# Patient Record
Sex: MALE | Race: WHITE | Employment: FULL TIME | URBAN - METROPOLITAN AREA
[De-identification: names, ages, dates, MRNs, and addresses within clinical notes are randomized per-mention and may not be internally consistent; named-entity substitution may affect disease eponyms.]

---

## 2019-01-11 ENCOUNTER — OFFICE VISIT (OUTPATIENT)
Dept: INTERNAL MEDICINE CLINIC | Facility: CLINIC | Age: 49
End: 2019-01-11
Payer: COMMERCIAL

## 2019-01-11 ENCOUNTER — MED REC SCAN ONLY (OUTPATIENT)
Dept: INTERNAL MEDICINE CLINIC | Facility: CLINIC | Age: 49
End: 2019-01-11

## 2019-01-11 VITALS
TEMPERATURE: 99 F | BODY MASS INDEX: 29.55 KG/M2 | HEIGHT: 68 IN | WEIGHT: 195 LBS | SYSTOLIC BLOOD PRESSURE: 124 MMHG | HEART RATE: 77 BPM | DIASTOLIC BLOOD PRESSURE: 78 MMHG | OXYGEN SATURATION: 98 %

## 2019-01-11 DIAGNOSIS — R21 RASH IN ADULT: ICD-10-CM

## 2019-01-11 DIAGNOSIS — Z00.00 ANNUAL PHYSICAL EXAM: Primary | ICD-10-CM

## 2019-01-11 DIAGNOSIS — N47.8 FORESKIN PROBLEM: ICD-10-CM

## 2019-01-11 DIAGNOSIS — Z86.39 HISTORY OF DIABETES MELLITUS: ICD-10-CM

## 2019-01-11 DIAGNOSIS — N52.9 ERECTILE DYSFUNCTION, UNSPECIFIED ERECTILE DYSFUNCTION TYPE: ICD-10-CM

## 2019-01-11 DIAGNOSIS — Z11.3 SCREENING EXAMINATION FOR STD (SEXUALLY TRANSMITTED DISEASE): ICD-10-CM

## 2019-01-11 PROCEDURE — 99386 PREV VISIT NEW AGE 40-64: CPT | Performed by: INTERNAL MEDICINE

## 2019-01-11 PROCEDURE — 90471 IMMUNIZATION ADMIN: CPT | Performed by: INTERNAL MEDICINE

## 2019-01-11 PROCEDURE — 90686 IIV4 VACC NO PRSV 0.5 ML IM: CPT | Performed by: INTERNAL MEDICINE

## 2019-01-11 RX ORDER — CHLORAL HYDRATE 500 MG
CAPSULE ORAL
COMMUNITY
End: 2019-10-04

## 2019-01-11 RX ORDER — OMEPRAZOLE 20 MG/1
20 CAPSULE, DELAYED RELEASE ORAL
COMMUNITY
End: 2020-07-02

## 2019-01-11 RX ORDER — CLOBETASOL PROPIONATE 0.5 MG/ML
1 LOTION TOPICAL 2 TIMES DAILY
Qty: 118 ML | Refills: 2 | Status: SHIPPED | OUTPATIENT
Start: 2019-01-11 | End: 2019-02-06 | Stop reason: ALTCHOICE

## 2019-01-11 RX ORDER — MULTIVITAMIN
1 TABLET ORAL DAILY
COMMUNITY

## 2019-01-11 RX ORDER — NICOTINE 14MG/24HR
PATCH, TRANSDERMAL 24 HOURS TRANSDERMAL
COMMUNITY
End: 2019-10-04

## 2019-01-11 RX ORDER — LORAZEPAM 1 MG
TABLET ORAL
COMMUNITY
End: 2019-02-04 | Stop reason: CLARIF

## 2019-01-11 RX ORDER — QUINIDINE SULFATE 200 MG
TABLET ORAL
COMMUNITY
End: 2019-10-04

## 2019-01-11 RX ORDER — PRASTERONE (DHEA) 50 MG
CAPSULE ORAL
COMMUNITY
End: 2019-10-04

## 2019-01-13 ENCOUNTER — LAB ENCOUNTER (OUTPATIENT)
Dept: LAB | Facility: HOSPITAL | Age: 49
End: 2019-01-13
Attending: INTERNAL MEDICINE
Payer: COMMERCIAL

## 2019-01-13 DIAGNOSIS — Z11.3 SCREENING EXAMINATION FOR STD (SEXUALLY TRANSMITTED DISEASE): ICD-10-CM

## 2019-01-13 DIAGNOSIS — E11.9 TYPE 2 DIABETES MELLITUS WITHOUT COMPLICATION, WITH LONG-TERM CURRENT USE OF INSULIN (HCC): Primary | ICD-10-CM

## 2019-01-13 DIAGNOSIS — Z79.4 TYPE 2 DIABETES MELLITUS WITHOUT COMPLICATION, WITH LONG-TERM CURRENT USE OF INSULIN (HCC): Primary | ICD-10-CM

## 2019-01-13 DIAGNOSIS — R21 RASH IN ADULT: ICD-10-CM

## 2019-01-13 DIAGNOSIS — N52.9 ERECTILE DYSFUNCTION, UNSPECIFIED ERECTILE DYSFUNCTION TYPE: ICD-10-CM

## 2019-01-13 DIAGNOSIS — Z86.39 HISTORY OF DIABETES MELLITUS: ICD-10-CM

## 2019-01-13 DIAGNOSIS — Z00.00 ANNUAL PHYSICAL EXAM: ICD-10-CM

## 2019-01-13 DIAGNOSIS — N47.8 FORESKIN PROBLEM: ICD-10-CM

## 2019-01-13 LAB
ALBUMIN SERPL BCP-MCNC: 4.1 G/DL (ref 3.5–4.8)
ALBUMIN/GLOB SERPL: 1.2 {RATIO} (ref 1–2)
ALP SERPL-CCNC: 106 U/L (ref 32–100)
ALT SERPL-CCNC: 25 U/L (ref 17–63)
ANION GAP SERPL CALC-SCNC: 11 MMOL/L (ref 0–18)
AST SERPL-CCNC: 19 U/L (ref 15–41)
BACTERIA UR QL AUTO: NEGATIVE /HPF
BASOPHILS # BLD: 0.1 K/UL (ref 0–0.2)
BASOPHILS NFR BLD: 1 %
BILIRUB SERPL-MCNC: 1.1 MG/DL (ref 0.3–1.2)
BILIRUB UR QL: NEGATIVE
BUN SERPL-MCNC: 10 MG/DL (ref 8–20)
BUN/CREAT SERPL: 10.5 (ref 10–20)
CALCIUM SERPL-MCNC: 9.7 MG/DL (ref 8.5–10.5)
CHLORIDE SERPL-SCNC: 98 MMOL/L (ref 95–110)
CHOLEST SERPL-MCNC: 182 MG/DL (ref 110–200)
CLARITY UR: CLEAR
CO2 SERPL-SCNC: 25 MMOL/L (ref 22–32)
COLOR UR: YELLOW
CREAT SERPL-MCNC: 0.95 MG/DL (ref 0.5–1.5)
CREAT UR-MCNC: 93.4 MG/DL
EOSINOPHIL # BLD: 0.3 K/UL (ref 0–0.7)
EOSINOPHIL NFR BLD: 4 %
ERYTHROCYTE [DISTWIDTH] IN BLOOD BY AUTOMATED COUNT: 12.9 % (ref 11–15)
EST. AVERAGE GLUCOSE BLD GHB EST-MCNC: 318 MG/DL (ref 68–126)
GLOBULIN PLAS-MCNC: 3.4 G/DL (ref 2.5–3.7)
GLUCOSE SERPL-MCNC: 354 MG/DL (ref 70–99)
GLUCOSE UR-MCNC: >=500 MG/DL
HBA1C MFR BLD HPLC: 12.7 % (ref ?–5.7)
HCT VFR BLD AUTO: 48.3 % (ref 41–52)
HDLC SERPL-MCNC: 38 MG/DL
HGB BLD-MCNC: 16.7 G/DL (ref 13.5–17.5)
HGB UR QL STRIP.AUTO: NEGATIVE
KETONES UR-MCNC: NEGATIVE MG/DL
LDLC SERPL CALC-MCNC: 104 MG/DL (ref 0–99)
LEUKOCYTE ESTERASE UR QL STRIP.AUTO: NEGATIVE
LYMPHOCYTES # BLD: 2.1 K/UL (ref 1–4)
LYMPHOCYTES NFR BLD: 33 %
MCH RBC QN AUTO: 30.3 PG (ref 27–32)
MCHC RBC AUTO-ENTMCNC: 34.7 G/DL (ref 32–37)
MCV RBC AUTO: 87.5 FL (ref 80–100)
MICROALBUMIN UR-MCNC: 0.2 MG/DL (ref 0–1.8)
MICROALBUMIN/CREAT UR: 2.1 MG/G{CREAT} (ref 0–20)
MONOCYTES # BLD: 0.6 K/UL (ref 0–1)
MONOCYTES NFR BLD: 10 %
NEUTROPHILS # BLD AUTO: 3.3 K/UL (ref 1.8–7.7)
NEUTROPHILS NFR BLD: 52 %
NITRITE UR QL STRIP.AUTO: NEGATIVE
NONHDLC SERPL-MCNC: 144 MG/DL
OSMOLALITY UR CALC.SUM OF ELEC: 291 MOSM/KG (ref 275–295)
PATIENT FASTING: YES
PH UR: 6 [PH] (ref 5–8)
PLATELET # BLD AUTO: 233 K/UL (ref 140–400)
PMV BLD AUTO: 8.3 FL (ref 7.4–10.3)
POTASSIUM SERPL-SCNC: 3.7 MMOL/L (ref 3.3–5.1)
PROT SERPL-MCNC: 7.5 G/DL (ref 5.9–8.4)
PROT UR-MCNC: NEGATIVE MG/DL
RBC # BLD AUTO: 5.52 M/UL (ref 4.5–5.9)
RBC #/AREA URNS AUTO: 1 /HPF
SODIUM SERPL-SCNC: 134 MMOL/L (ref 136–144)
SP GR UR STRIP: 1.02 (ref 1–1.03)
TRIGL SERPL-MCNC: 199 MG/DL (ref 1–149)
TSH SERPL-ACNC: 2.63 UIU/ML (ref 0.45–5.33)
UROBILINOGEN UR STRIP-ACNC: <2
VIT C UR-MCNC: NEGATIVE MG/DL
WBC # BLD AUTO: 6.4 K/UL (ref 4–11)
WBC #/AREA URNS AUTO: <1 /HPF

## 2019-01-13 PROCEDURE — 84402 ASSAY OF FREE TESTOSTERONE: CPT

## 2019-01-13 PROCEDURE — 86696 HERPES SIMPLEX TYPE 2 TEST: CPT

## 2019-01-13 PROCEDURE — 81001 URINALYSIS AUTO W/SCOPE: CPT

## 2019-01-13 PROCEDURE — 84443 ASSAY THYROID STIM HORMONE: CPT

## 2019-01-13 PROCEDURE — 87591 N.GONORRHOEAE DNA AMP PROB: CPT

## 2019-01-13 PROCEDURE — 85025 COMPLETE CBC W/AUTO DIFF WBC: CPT

## 2019-01-13 PROCEDURE — 87491 CHLMYD TRACH DNA AMP PROBE: CPT

## 2019-01-13 PROCEDURE — 82043 UR ALBUMIN QUANTITATIVE: CPT

## 2019-01-13 PROCEDURE — 36415 COLL VENOUS BLD VENIPUNCTURE: CPT

## 2019-01-13 PROCEDURE — 87389 HIV-1 AG W/HIV-1&-2 AB AG IA: CPT

## 2019-01-13 PROCEDURE — 83036 HEMOGLOBIN GLYCOSYLATED A1C: CPT

## 2019-01-13 PROCEDURE — 80061 LIPID PANEL: CPT

## 2019-01-13 PROCEDURE — 82570 ASSAY OF URINE CREATININE: CPT

## 2019-01-13 PROCEDURE — 84403 ASSAY OF TOTAL TESTOSTERONE: CPT

## 2019-01-13 PROCEDURE — 86694 HERPES SIMPLEX NES ANTBDY: CPT

## 2019-01-13 PROCEDURE — 80053 COMPREHEN METABOLIC PANEL: CPT

## 2019-01-13 PROCEDURE — 87340 HEPATITIS B SURFACE AG IA: CPT

## 2019-01-13 PROCEDURE — 86695 HERPES SIMPLEX TYPE 1 TEST: CPT

## 2019-01-13 PROCEDURE — 86803 HEPATITIS C AB TEST: CPT

## 2019-01-13 PROCEDURE — 86780 TREPONEMA PALLIDUM: CPT

## 2019-01-13 RX ORDER — GLIPIZIDE 5 MG/1
5 TABLET ORAL
Qty: 30 TABLET | Refills: 2 | Status: SHIPPED | OUTPATIENT
Start: 2019-01-13 | End: 2019-01-24

## 2019-01-13 NOTE — PROGRESS NOTES
HPI:    Patient ID: Marcellus Bowie is a 57year old male. HPI  Patient comes in for first time to establish care and discuss a few problems patient mentions that lately more than a few months or so he has been having problem with erection.   Difficulty w Take by mouth. Disp:  Rfl:    Safflower Oil (CLA) 1000 MG Oral Cap Take by mouth. Disp:  Rfl:    Multiple Vitamin (MULTI-VITAMIN DAILY) Oral Tab Take by mouth. Disp:  Rfl:    Omega-3 1000 MG Oral Cap Take by mouth.  Disp:  Rfl:    Saccharomyces boulardii (P motion. He exhibits no edema or tenderness. Neurological: He is alert and oriented to person, place, and time. He has normal reflexes. Skin: Skin is warm and dry. Rash noted. Psychiatric: He has a normal mood and affect.  His behavior is normal. Nahed

## 2019-01-14 LAB
C TRACH DNA SPEC QL NAA+PROBE: NEGATIVE
HBV SURFACE AG SERPL QL IA: NONREACTIVE
HCV AB SERPL QL IA: NONREACTIVE
HIV1+2 AB SERPL QL IA: NONREACTIVE
N GONORRHOEA DNA SPEC QL NAA+PROBE: NEGATIVE
T PALLIDUM AB SER QL: NEGATIVE

## 2019-01-15 ENCOUNTER — TELEPHONE (OUTPATIENT)
Dept: INTERNAL MEDICINE CLINIC | Facility: CLINIC | Age: 49
End: 2019-01-15

## 2019-01-15 DIAGNOSIS — E11.9 TYPE 2 DIABETES MELLITUS WITHOUT COMPLICATION, WITH LONG-TERM CURRENT USE OF INSULIN (HCC): Primary | ICD-10-CM

## 2019-01-15 DIAGNOSIS — Z79.4 TYPE 2 DIABETES MELLITUS WITHOUT COMPLICATION, WITH LONG-TERM CURRENT USE OF INSULIN (HCC): Primary | ICD-10-CM

## 2019-01-15 NOTE — TELEPHONE ENCOUNTER
Patient was told to check blood sugar daily patient states there is nothing at [facility] that they need order for testing machine. Has not started any medications that was prescribed want to wait and pick everything up at once.

## 2019-01-16 ENCOUNTER — PATIENT MESSAGE (OUTPATIENT)
Dept: INTERNAL MEDICINE CLINIC | Facility: CLINIC | Age: 49
End: 2019-01-16

## 2019-01-16 ENCOUNTER — TELEPHONE (OUTPATIENT)
Dept: INTERNAL MEDICINE CLINIC | Facility: CLINIC | Age: 49
End: 2019-01-16

## 2019-01-16 LAB
HSV 1 GLYCOPROTEIN G AB, IGG: 10.7 IV
HSV 2 GLYCOPROTEIN G AB, IGG: 0.25 IV
TESTOSTERONE, FREE, S: 10 NG/DL
TESTOSTERONE, TOTAL, S: 557 NG/DL

## 2019-01-16 RX ORDER — BLOOD SUGAR DIAGNOSTIC
STRIP MISCELLANEOUS
Qty: 100 STRIP | Refills: 2 | Status: ON HOLD | OUTPATIENT
Start: 2019-01-16 | End: 2019-11-07

## 2019-01-16 RX ORDER — LANCETS 30 GAUGE
EACH MISCELLANEOUS
Qty: 100 EACH | Refills: 1 | Status: SHIPPED | OUTPATIENT
Start: 2019-01-16 | End: 2019-12-06

## 2019-01-16 NOTE — TELEPHONE ENCOUNTER
From: Stevo Amato  To: Tracy Cuevas MD  Sent: 1/16/2019 1:00 PM CST  Subject: Non-Urgent Medical Question    Please call me when you get this message need to get prescription for test strips and lancets for Accu-Check Guide that's the meter I bought

## 2019-01-17 ENCOUNTER — TELEPHONE (OUTPATIENT)
Dept: INTERNAL MEDICINE CLINIC | Facility: CLINIC | Age: 49
End: 2019-01-17

## 2019-01-17 LAB
HSV TYPE 1/2 COMBINED AB, IGG: 20.1 IV
HSV TYPE 1/2 COMBINED ABS, IGM: 0.77 IV

## 2019-01-17 NOTE — TELEPHONE ENCOUNTER
Spoke with [facility] pharmacy and they have the lancets ready and they will fill the test strips for accucheck today. Pt informed.

## 2019-01-17 NOTE — TELEPHONE ENCOUNTER
Pt states that in [facility] he needs to see ophthalmologist, and also pneumonia vaccine. Please advise.

## 2019-01-17 NOTE — TELEPHONE ENCOUNTER
We can give the pn shot when we see him in 1 month and referral to eye doctor, HSV 1 is +, this causes cold sores, no need to treat unles he gets frequent brake outs, can discuss further when he comes in in 1 month

## 2019-01-18 NOTE — TELEPHONE ENCOUNTER
Pt asked to give him a call personal  Will like to speak with Dr. Magalys Marmolejo personal matter.

## 2019-01-24 ENCOUNTER — TELEPHONE (OUTPATIENT)
Dept: INTERNAL MEDICINE CLINIC | Facility: CLINIC | Age: 49
End: 2019-01-24

## 2019-01-24 RX ORDER — GLIPIZIDE 5 MG/1
5 TABLET ORAL
Qty: 30 TABLET | Refills: 2 | Status: SHIPPED | OUTPATIENT
Start: 2019-01-24 | End: 2019-06-24

## 2019-02-04 ENCOUNTER — HOSPITAL ENCOUNTER (OUTPATIENT)
Dept: ENDOCRINOLOGY | Facility: HOSPITAL | Age: 49
Discharge: HOME | End: 2019-02-04
Attending: INTERNAL MEDICINE
Payer: COMMERCIAL

## 2019-02-04 VITALS — WEIGHT: 202 LBS | BODY MASS INDEX: 31 KG/M2

## 2019-02-04 DIAGNOSIS — E11.65 TYPE 2 DIABETES MELLITUS WITH HYPERGLYCEMIA, WITHOUT LONG-TERM CURRENT USE OF INSULIN (HCC): Primary | ICD-10-CM

## 2019-02-04 NOTE — PROGRESS NOTES
Annabella Sprague  : 9/15/1970 attended individual initial assessment for Diabetes Education:    Date: 2019   Start time: 2:00 pm End time: 2:40 pm    HgbA1C (%)   Date Value   2019 12.7 (H)        Assessment: Mariely Perez was diagnosed with Diabetes m

## 2019-02-06 ENCOUNTER — OFFICE VISIT (OUTPATIENT)
Dept: DERMATOLOGY CLINIC | Facility: CLINIC | Age: 49
End: 2019-02-06
Payer: COMMERCIAL

## 2019-02-06 DIAGNOSIS — L40.8 OTHER PSORIASIS: ICD-10-CM

## 2019-02-06 DIAGNOSIS — L71.9 ROSACEA: ICD-10-CM

## 2019-02-06 DIAGNOSIS — L30.9 DERMATITIS: Primary | ICD-10-CM

## 2019-02-06 PROCEDURE — 99203 OFFICE O/P NEW LOW 30 MIN: CPT | Performed by: DERMATOLOGY

## 2019-02-06 PROCEDURE — 99212 OFFICE O/P EST SF 10 MIN: CPT | Performed by: DERMATOLOGY

## 2019-02-06 RX ORDER — CALCIPOTRIENE, BETAMETHASONE DIPROPIONATE 50; .643 UG/G; MG/G
1 OINTMENT TOPICAL DAILY
Qty: 100 G | Refills: 3 | Status: SHIPPED | OUTPATIENT
Start: 2019-02-06 | End: 2019-05-07

## 2019-02-06 NOTE — PROGRESS NOTES
Used cream -clobetasol? In past no change, used taclonex--still has not helpful, does not decrease the redness.

## 2019-02-11 ENCOUNTER — TELEPHONE (OUTPATIENT)
Dept: DERMATOLOGY CLINIC | Facility: CLINIC | Age: 49
End: 2019-02-11

## 2019-02-12 NOTE — TELEPHONE ENCOUNTER
per rep--best phamacy option is [facility] send there and let pt know. Alternative would be to send Ultravate lotion to [facility] . If necessary may try pa.

## 2019-02-15 ENCOUNTER — OFFICE VISIT (OUTPATIENT)
Dept: INTERNAL MEDICINE CLINIC | Facility: CLINIC | Age: 49
End: 2019-02-15
Payer: COMMERCIAL

## 2019-02-15 VITALS
DIASTOLIC BLOOD PRESSURE: 79 MMHG | WEIGHT: 195 LBS | HEIGHT: 68 IN | HEART RATE: 87 BPM | SYSTOLIC BLOOD PRESSURE: 124 MMHG | BODY MASS INDEX: 29.55 KG/M2

## 2019-02-15 DIAGNOSIS — R94.31 EKG, ABNORMAL: ICD-10-CM

## 2019-02-15 DIAGNOSIS — R20.0 BILATERAL HAND NUMBNESS: ICD-10-CM

## 2019-02-15 DIAGNOSIS — E78.5 HYPERLIPIDEMIA, UNSPECIFIED HYPERLIPIDEMIA TYPE: ICD-10-CM

## 2019-02-15 DIAGNOSIS — M25.541 ARTHRALGIA OF BOTH HANDS: ICD-10-CM

## 2019-02-15 DIAGNOSIS — R06.00 EXERTIONAL DYSPNEA: ICD-10-CM

## 2019-02-15 DIAGNOSIS — M25.542 ARTHRALGIA OF BOTH HANDS: ICD-10-CM

## 2019-02-15 DIAGNOSIS — E11.9 TYPE 2 DIABETES MELLITUS WITHOUT COMPLICATION, WITH LONG-TERM CURRENT USE OF INSULIN (HCC): Primary | ICD-10-CM

## 2019-02-15 DIAGNOSIS — L40.9 PSORIASIS: ICD-10-CM

## 2019-02-15 DIAGNOSIS — H54.7 VISION PROBLEMS: ICD-10-CM

## 2019-02-15 DIAGNOSIS — Z79.4 TYPE 2 DIABETES MELLITUS WITHOUT COMPLICATION, WITH LONG-TERM CURRENT USE OF INSULIN (HCC): Primary | ICD-10-CM

## 2019-02-15 DIAGNOSIS — N52.9 ERECTILE DYSFUNCTION, UNSPECIFIED ERECTILE DYSFUNCTION TYPE: ICD-10-CM

## 2019-02-15 LAB
EST. AVERAGE GLUCOSE BLD GHB EST-MCNC: 232 MG/DL (ref 68–126)
HBA1C MFR BLD HPLC: 9.7 % (ref ?–5.7)

## 2019-02-15 PROCEDURE — 36415 COLL VENOUS BLD VENIPUNCTURE: CPT | Performed by: INTERNAL MEDICINE

## 2019-02-15 PROCEDURE — 93000 ELECTROCARDIOGRAM COMPLETE: CPT | Performed by: INTERNAL MEDICINE

## 2019-02-15 PROCEDURE — 99215 OFFICE O/P EST HI 40 MIN: CPT | Performed by: INTERNAL MEDICINE

## 2019-02-15 RX ORDER — GABAPENTIN 100 MG/1
100 CAPSULE ORAL 3 TIMES DAILY
Qty: 30 CAPSULE | Refills: 2 | Status: SHIPPED | OUTPATIENT
Start: 2019-02-15 | End: 2019-10-04

## 2019-02-15 RX ORDER — NICOTINE 21 MG/24HR
1 PATCH, TRANSDERMAL 24 HOURS TRANSDERMAL EVERY 24 HOURS
Qty: 30 PATCH | Refills: 2 | Status: SHIPPED | OUTPATIENT
Start: 2019-02-15 | End: 2019-10-04

## 2019-02-15 RX ORDER — SIMVASTATIN 40 MG
40 TABLET ORAL NIGHTLY
Qty: 30 TABLET | Refills: 2 | Status: SHIPPED | OUTPATIENT
Start: 2019-02-15 | End: 2019-05-19

## 2019-02-15 NOTE — PROGRESS NOTES
HPI:    Patient ID: Percy Collins is a 57year old male.     HPI  Patient comes in for follow-up recently was found to have diabetes come back A1c was the 12th was started on metformin and glipizide says he has been taking his blood sugars have been pretty capsule (100 mg total) by mouth 3 (three) times daily. Disp: 30 capsule Rfl: 2   simvastatin 40 MG Oral Tab Take 1 tablet (40 mg total) by mouth nightly.  Disp: 30 tablet Rfl: 2   Halobetasol Propionate (BRYHALI) 0.01 % External Lotion Apply 1 Application t Diagnosis Date   • Diabetes ([facility] [address]    • Eczema    • Psoriasis       Past Surgical History:   Procedure Laterality Date   • HERNIA SURGERY        Family History   Problem Relation Age of Onset   • Diabetes Mother    • Cancer Sister       Social History:  So follow the stress test and if okay will decide to treat  Bilateral hand numbness most likely due to diabetes we will start him on gabapentin take at night also with a complaint of finger cyst swelling and joint pain referred to rheumatology due to history
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
## 2019-02-22 ENCOUNTER — MED REC SCAN ONLY (OUTPATIENT)
Dept: INTERNAL MEDICINE CLINIC | Facility: CLINIC | Age: 49
End: 2019-02-22

## 2019-03-06 ENCOUNTER — APPOINTMENT (OUTPATIENT)
Dept: ENDOCRINOLOGY | Facility: HOSPITAL | Age: 49
End: 2019-03-06
Attending: INTERNAL MEDICINE
Payer: COMMERCIAL

## 2019-03-11 ENCOUNTER — HOSPITAL ENCOUNTER (OUTPATIENT)
Dept: NUCLEAR MEDICINE | Facility: HOSPITAL | Age: 49
Discharge: HOME | End: 2019-03-11
Attending: INTERNAL MEDICINE
Payer: COMMERCIAL

## 2019-03-11 ENCOUNTER — HOSPITAL ENCOUNTER (OUTPATIENT)
Dept: CV DIAGNOSTICS | Facility: HOSPITAL | Age: 49
Discharge: HOME | End: 2019-03-11
Attending: INTERNAL MEDICINE
Payer: COMMERCIAL

## 2019-03-11 ENCOUNTER — APPOINTMENT (OUTPATIENT)
Dept: ENDOCRINOLOGY | Facility: HOSPITAL | Age: 49
End: 2019-03-11
Attending: INTERNAL MEDICINE
Payer: COMMERCIAL

## 2019-03-11 DIAGNOSIS — E11.9 TYPE 2 DIABETES MELLITUS WITHOUT COMPLICATION, WITH LONG-TERM CURRENT USE OF INSULIN (HCC): ICD-10-CM

## 2019-03-11 DIAGNOSIS — R94.31 EKG, ABNORMAL: ICD-10-CM

## 2019-03-11 DIAGNOSIS — Z79.4 TYPE 2 DIABETES MELLITUS WITHOUT COMPLICATION, WITH LONG-TERM CURRENT USE OF INSULIN (HCC): ICD-10-CM

## 2019-03-11 DIAGNOSIS — R06.00 EXERTIONAL DYSPNEA: ICD-10-CM

## 2019-03-11 PROCEDURE — 93016 CV STRESS TEST SUPVJ ONLY: CPT | Performed by: INTERNAL MEDICINE

## 2019-03-11 PROCEDURE — 93018 CV STRESS TEST I&R ONLY: CPT | Performed by: INTERNAL MEDICINE

## 2019-03-11 PROCEDURE — 93017 CV STRESS TEST TRACING ONLY: CPT | Performed by: INTERNAL MEDICINE

## 2019-03-11 PROCEDURE — 78452 HT MUSCLE IMAGE SPECT MULT: CPT | Performed by: INTERNAL MEDICINE

## 2019-03-11 RX ORDER — SODIUM CHLORIDE 9 MG/ML
INJECTION, SOLUTION INTRAVENOUS
Status: COMPLETED
Start: 2019-03-11 | End: 2019-03-11

## 2019-03-11 RX ADMIN — SODIUM CHLORIDE 75 ML: 9 INJECTION, SOLUTION INTRAVENOUS at 12:50:00

## 2019-03-18 ENCOUNTER — PATIENT MESSAGE (OUTPATIENT)
Dept: INTERNAL MEDICINE CLINIC | Facility: CLINIC | Age: 49
End: 2019-03-18

## 2019-03-18 ENCOUNTER — APPOINTMENT (OUTPATIENT)
Dept: ENDOCRINOLOGY | Facility: HOSPITAL | Age: 49
End: 2019-03-18
Attending: INTERNAL MEDICINE
Payer: COMMERCIAL

## 2019-04-16 NOTE — TELEPHONE ENCOUNTER
Failed Protocol  A1C >7.5  Diabetes Medications  Protocol Criteria:  · Appointment scheduled in the past 6 months or the next 3 months  · A1C < 7.5 in the past 6 months  · Creatinine in the past 12 months  · Creatinine result < 1.5   Recent Outpatient Visi

## 2019-04-22 ENCOUNTER — APPOINTMENT (OUTPATIENT)
Dept: ENDOCRINOLOGY | Facility: HOSPITAL | Age: 49
End: 2019-04-22
Attending: INTERNAL MEDICINE
Payer: COMMERCIAL

## 2019-05-04 ENCOUNTER — HOSPITAL ENCOUNTER (EMERGENCY)
Facility: HOSPITAL | Age: 49
Discharge: HOME | End: 2019-05-04
Payer: OTHER MISCELLANEOUS

## 2019-05-04 ENCOUNTER — APPOINTMENT (OUTPATIENT)
Dept: GENERAL RADIOLOGY | Facility: HOSPITAL | Age: 49
End: 2019-05-04
Payer: OTHER MISCELLANEOUS

## 2019-05-04 VITALS
RESPIRATION RATE: 18 BRPM | DIASTOLIC BLOOD PRESSURE: 86 MMHG | TEMPERATURE: 98 F | BODY MASS INDEX: 27.3 KG/M2 | HEIGHT: 71 IN | SYSTOLIC BLOOD PRESSURE: 135 MMHG | HEART RATE: 83 BPM | OXYGEN SATURATION: 98 % | WEIGHT: 195 LBS

## 2019-05-04 DIAGNOSIS — S93.409A MODERATE ANKLE SPRAIN, UNSPECIFIED LATERALITY, INITIAL ENCOUNTER: Primary | ICD-10-CM

## 2019-05-04 PROCEDURE — 73610 X-RAY EXAM OF ANKLE: CPT

## 2019-05-04 PROCEDURE — 99283 EMERGENCY DEPT VISIT LOW MDM: CPT

## 2019-05-04 NOTE — ED PROVIDER NOTES
Patient Seen in: [facility] AND [facility] Emergency Department    History   Patient presents with:  Lower Extremity Injury (musculoskeletal)    Stated Complaint: L ankle injury at work    HPI    Patient is a 61-year-old man who describes an inversion injury to joint osteoarthritis. 4. Lesser incidental findings as above.    Dictated by (CST): Harish Merida MD on 5/04/2019 at 14:22     Approved by (CST): Harish Merida MD on 5/04/2019 at 14:23                MDM   Xr Ankle (min 3 Views), Left (cpt=[id number])    Res

## 2019-05-04 NOTE — ED INITIAL ASSESSMENT (HPI)
Pt reports injuring left ankle at work. Per pt he twisted it while in the bathroom. Pt did not fall. Reports pain has worsened since it originally happened. CMS intact. Increased pain with weight bearing.

## 2019-05-20 RX ORDER — SIMVASTATIN 40 MG
TABLET ORAL
Qty: 90 TABLET | Refills: 1 | Status: SHIPPED | OUTPATIENT
Start: 2019-05-20 | End: 2019-10-04

## 2019-05-20 NOTE — TELEPHONE ENCOUNTER
Refill passed per [facility] protocol.   Cholesterol Medications  Protocol Criteria:  · Appointment scheduled in the past 12 months or in the next 3 months  · ALT & LDL on file in the past 12 months  · ALT result < 80  · LDL result <130   Recent Outpat

## 2019-06-03 ENCOUNTER — OFFICE VISIT (OUTPATIENT)
Dept: SURGERY | Facility: CLINIC | Age: 49
End: 2019-06-03
Payer: COMMERCIAL

## 2019-06-03 VITALS
TEMPERATURE: 98 F | HEIGHT: 71 IN | WEIGHT: 195 LBS | HEART RATE: 76 BPM | DIASTOLIC BLOOD PRESSURE: 77 MMHG | SYSTOLIC BLOOD PRESSURE: 118 MMHG | BODY MASS INDEX: 27.3 KG/M2

## 2019-06-03 DIAGNOSIS — N47.1 PHIMOSIS: Primary | ICD-10-CM

## 2019-06-03 PROCEDURE — 99212 OFFICE O/P EST SF 10 MIN: CPT | Performed by: UROLOGY

## 2019-06-03 PROCEDURE — 99244 OFF/OP CNSLTJ NEW/EST MOD 40: CPT | Performed by: UROLOGY

## 2019-06-03 NOTE — PROGRESS NOTES
SUBJECTIVE:  Brynn Green is a 57year old male who presents for a consultation at the request of, and a copy of this note will be sent to, Dr. David Marvin, for evaluation of  phimosis. He states that the problem is unchanged.  Symptoms include phimosis apparent distress. Alert and oriented times three, pleasant and cooperative.   CARDIOVASCULAR:normal apical impulse  RESPIRATORY: no chest wall deformities or tenderness  ABDOMEN: abdomen is soft without significant tenderness, masses, organomegaly or guar

## 2019-06-05 ENCOUNTER — TELEPHONE (OUTPATIENT)
Dept: SURGERY | Facility: CLINIC | Age: 49
End: 2019-06-05

## 2019-06-05 DIAGNOSIS — Z01.818 PREOP EXAMINATION: Primary | ICD-10-CM

## 2019-06-07 NOTE — TELEPHONE ENCOUNTER
Spoke with patient,scheduled circumcision Wednesday 07/24/19, [place] outpatient surgery, informed patient that labs are due prior to procedure, I will mail pre-op/labs to patient, verbalized understanding, all questions answered.

## 2019-06-24 RX ORDER — GLIPIZIDE 5 MG/1
5 TABLET ORAL
Qty: 90 TABLET | Refills: 1 | Status: SHIPPED | OUTPATIENT
Start: 2019-06-24 | End: 2019-12-21

## 2019-06-24 NOTE — TELEPHONE ENCOUNTER
Diabetes Medications  Protocol Criteria:  · Appointment scheduled in the past 6 months or the next 3 months  · A1C < 7.5 in the past 6 months  · Creatinine in the past 12 months  · Creatinine result < 1.5   Recent Outpatient Visits            3 weeks ago P

## 2019-07-11 ENCOUNTER — TELEPHONE (OUTPATIENT)
Dept: SURGERY | Facility: CLINIC | Age: 49
End: 2019-07-11

## 2019-07-11 NOTE — TELEPHONE ENCOUNTER
Pt is scheduled for procedure on 7-24-19. Pt has misplaced pre op orders. Blood test and ekg. Where would pt go for the ekg.   Please call pt to advise

## 2019-07-11 NOTE — TELEPHONE ENCOUNTER
Returned patient' call informed to have both done at the [facility], verbalized understanding, all questions answered.

## 2019-07-18 ENCOUNTER — TELEPHONE (OUTPATIENT)
Dept: SURGERY | Facility: CLINIC | Age: 49
End: 2019-07-18

## 2019-07-18 NOTE — TELEPHONE ENCOUNTER
Patient called to reschedule wednesday 07/24/19, procedure, will reschedule to Wednesday 09/04/19, patient verbalized understanding, thankful. Will inform surgery center, and update schedule.

## 2019-08-28 ENCOUNTER — TELEPHONE (OUTPATIENT)
Dept: SURGERY | Facility: CLINIC | Age: 49
End: 2019-08-28

## 2019-08-29 NOTE — TELEPHONE ENCOUNTER
Called patient back, due not having time off of work, patient would like to reschedule procedure to Wednesday 11/02/19, [facility] surgery center, patient ask that a letter be generated for date of change.

## 2019-09-19 ENCOUNTER — TELEPHONE (OUTPATIENT)
Dept: SURGERY | Facility: CLINIC | Age: 49
End: 2019-09-19

## 2019-09-23 NOTE — TELEPHONE ENCOUNTER
L/M asking patient to verbally confirm reschedule or cancellation. I have called several times, phone goes directly to v/m.

## 2019-10-04 ENCOUNTER — OFFICE VISIT (OUTPATIENT)
Dept: INTERNAL MEDICINE CLINIC | Facility: CLINIC | Age: 49
End: 2019-10-04
Payer: COMMERCIAL

## 2019-10-04 VITALS
TEMPERATURE: 98 F | OXYGEN SATURATION: 99 % | HEIGHT: 71 IN | DIASTOLIC BLOOD PRESSURE: 79 MMHG | SYSTOLIC BLOOD PRESSURE: 119 MMHG | BODY MASS INDEX: 27.81 KG/M2 | WEIGHT: 198.63 LBS | HEART RATE: 73 BPM

## 2019-10-04 DIAGNOSIS — N52.9 ERECTILE DYSFUNCTION, UNSPECIFIED ERECTILE DYSFUNCTION TYPE: ICD-10-CM

## 2019-10-04 DIAGNOSIS — Z79.4 TYPE 2 DIABETES MELLITUS WITHOUT COMPLICATION, WITH LONG-TERM CURRENT USE OF INSULIN (HCC): Primary | ICD-10-CM

## 2019-10-04 DIAGNOSIS — L40.9 PSORIASIS: ICD-10-CM

## 2019-10-04 DIAGNOSIS — E11.9 TYPE 2 DIABETES MELLITUS WITHOUT COMPLICATION, WITH LONG-TERM CURRENT USE OF INSULIN (HCC): Primary | ICD-10-CM

## 2019-10-04 DIAGNOSIS — E78.5 HYPERLIPIDEMIA, UNSPECIFIED HYPERLIPIDEMIA TYPE: ICD-10-CM

## 2019-10-04 PROCEDURE — 99214 OFFICE O/P EST MOD 30 MIN: CPT | Performed by: INTERNAL MEDICINE

## 2019-10-04 RX ORDER — SILDENAFIL 100 MG/1
100 TABLET, FILM COATED ORAL AS NEEDED
Qty: 9 TABLET | Refills: 3 | Status: SHIPPED | OUTPATIENT
Start: 2019-10-04 | End: 2020-06-21

## 2019-10-04 NOTE — PROGRESS NOTES
HPI:    Patient ID: Indio Mccoy is a 54year old male. HPI  Pt comes in for follow up on the DM, over all doing ok taking meds watching diet but he checking his blood sugars.   Patient had seen Dr. Michelle People for his psoriasis had used some cream which sh Lancets for Accu-Chek Guide Testing 2-3 times a day Dx E11.65, patient not insulin dependent.  Disp: 100 each Rfl: 1     Allergies:No Known Allergies    HISTORY:  Past Medical History:   Diagnosis Date   • Diabetes ([facility] [address]    • Eczema    • Psoriasis       Pas unspecified erectile dysfunction type-start medication take as prescribed  Hyperlipidemia, unspecified hyperlipidemia type retest patient never started the medication will decide on treatment pending results  Psoriasis stable.     Orders Placed This Encount

## 2019-11-01 PROBLEM — F32.9 MAJOR DEPRESSION: Status: ACTIVE | Noted: 2019-11-01

## 2019-11-01 NOTE — ED NOTES
Contreras Henley has been accepted for admission to [facility] under the care of Dr Grecia Waggoner. He will be admitted to room 614B. Phone number to call nurse report is [phone]. Update Contreras Henley regarding acceptance to [facility] and he agree to sign himself in.

## 2019-11-01 NOTE — ED NOTES
Assumed care of patient, pt resting comfortably on cart, next for  , will continue to monitor closely
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
## 2019-11-01 NOTE — ED INITIAL ASSESSMENT (HPI)
Patient reports depression, SI \"due to alcohol and drugs. \" Has not slept in 64 hours. States he would run his car in to a tree.

## 2019-11-01 NOTE — ED NOTES
54year old male here from home with SI and thoughts to run car into a tree, pt denies past attempts but was admitted to psych a couple a years ago, pt reports has been self medicating with drugs at alcohol, last used crack cocaine this am and drank a coup

## 2019-11-01 NOTE — ED PROVIDER NOTES
Patient Seen in: [facility] AND [facility] Emergency Department      History   Patient presents with:  Eval-P (psychiatric)    Stated Complaint:     HPI    Patient is a 31-year-old male who presents to the emergency department with a chief complaint of suicidal Musculoskeletal: Neck supple. Cardiovascular:      Rate and Rhythm: Normal rate and regular rhythm. Pulses: Normal pulses. Heart sounds: Normal heart sounds.    Pulmonary:      Effort: Pulmonary effort is normal.   Abdominal:      General: Abdom DIFFERENTIAL[[id number]]          Abnormal            Final result                 Please view results for these tests on the individual orders.    URINALYSIS WITH CULTURE REFLEX   RAINBOW DRAW BLUE   RAINBOW DRAW LAVENDER   RAINBOW DRAW LIGHT GREEN   [address]

## 2019-11-02 PROBLEM — F10.20 ALCOHOL USE DISORDER, SEVERE, DEPENDENCE (HCC): Status: ACTIVE | Noted: 2019-11-02

## 2019-11-02 PROBLEM — F15.90 STIMULANT USE DISORDER: Status: ACTIVE | Noted: 2019-11-02

## 2019-11-02 PROBLEM — F33.2 SEVERE RECURRENT MAJOR DEPRESSION WITHOUT PSYCHOTIC FEATURES (HCC): Status: ACTIVE | Noted: 2019-11-02

## 2019-11-14 NOTE — TELEPHONE ENCOUNTER
Left message to call back to clarify with pt if still on metformin. MyChart message also sent.   Per chart review: metformin only active on med list as pt reported (historical); previously d/c'd from med list by Dr Alicia Frias during 10/4/19 office visit:    sandy

## 2019-11-18 NOTE — TELEPHONE ENCOUNTER
Last attempt to reach by phone. No response letter mailed. Css: if patient does call back, transfer to triage dept.

## 2019-11-29 NOTE — TELEPHONE ENCOUNTER
CSS, please assist patient in making an appointment with Dr. Isma Deal as soon as possible regarding his refill request.        Left message to call back. "[employer]t message was also sent to patient informing him to call our office to schedule an appointment.

## 2019-11-29 NOTE — TELEPHONE ENCOUNTER
Doctor, received message from pt stating he is still taking Metformin 1000 mg, med was listed as discontinued at 10/4/19 OV. Please advise.      Diabetes Medications  Protocol Criteria:  · Appointment scheduled in the past 6 months or the next 3 months

## 2019-12-06 NOTE — PROGRESS NOTES
HPI:    Patient ID: Roger Gold is a 54year old male.     HPI  Is in today for follow-up after he was seen in ER with suicidal thoughts he was admitted in his psych unit at [facility] patient with history of alcohol abuse and cocaine he says he was inp ITCHING    Comment: Cat dander: Watery eyes, sneezing    HISTORY:  Past Medical History:   Diagnosis Date   • Depression    • Diabetes ([facility] [address]    • Eczema    • Hyperlipidemia    • Psoriasis       Past Surgical History:   Procedure Laterality Date   • H Judgment and thought content normal.   Nursing note and vitals reviewed.            ASSESSMENT/PLAN:   Follow-up examination after psychotherapy  (primary encounter diagnosis)- doing well, taking medication,   Type 2 diabetes mellitus without complication,

## 2019-12-09 ENCOUNTER — APPOINTMENT (OUTPATIENT)
Dept: LAB | Facility: HOSPITAL | Age: 49
End: 2019-12-09
Attending: INTERNAL MEDICINE
Payer: COMMERCIAL

## 2019-12-09 DIAGNOSIS — N52.9 ERECTILE DYSFUNCTION, UNSPECIFIED ERECTILE DYSFUNCTION TYPE: ICD-10-CM

## 2019-12-09 DIAGNOSIS — E11.9 TYPE 2 DIABETES MELLITUS WITHOUT COMPLICATION, WITH LONG-TERM CURRENT USE OF INSULIN (HCC): ICD-10-CM

## 2019-12-09 DIAGNOSIS — Z79.4 TYPE 2 DIABETES MELLITUS WITHOUT COMPLICATION, WITH LONG-TERM CURRENT USE OF INSULIN (HCC): ICD-10-CM

## 2019-12-09 DIAGNOSIS — E78.5 HYPERLIPIDEMIA, UNSPECIFIED HYPERLIPIDEMIA TYPE: ICD-10-CM

## 2019-12-09 PROCEDURE — 83036 HEMOGLOBIN GLYCOSYLATED A1C: CPT

## 2019-12-09 PROCEDURE — 82570 ASSAY OF URINE CREATININE: CPT

## 2019-12-09 PROCEDURE — 82043 UR ALBUMIN QUANTITATIVE: CPT

## 2019-12-09 PROCEDURE — 36415 COLL VENOUS BLD VENIPUNCTURE: CPT

## 2019-12-09 PROCEDURE — 80061 LIPID PANEL: CPT

## 2019-12-21 ENCOUNTER — HOSPITAL ENCOUNTER (EMERGENCY)
Facility: HOSPITAL | Age: 49
Discharge: HOME | End: 2019-12-21
Attending: EMERGENCY MEDICINE
Payer: COMMERCIAL

## 2019-12-21 VITALS
TEMPERATURE: 98 F | WEIGHT: 200 LBS | DIASTOLIC BLOOD PRESSURE: 83 MMHG | OXYGEN SATURATION: 98 % | HEART RATE: 62 BPM | RESPIRATION RATE: 14 BRPM | SYSTOLIC BLOOD PRESSURE: 122 MMHG | BODY MASS INDEX: 28 KG/M2

## 2019-12-21 DIAGNOSIS — K02.9 PAIN DUE TO DENTAL CARIES: Primary | ICD-10-CM

## 2019-12-21 PROCEDURE — 99283 EMERGENCY DEPT VISIT LOW MDM: CPT

## 2019-12-21 RX ORDER — IBUPROFEN 600 MG/1
600 TABLET ORAL EVERY 8 HOURS PRN
Qty: 30 TABLET | Refills: 0 | Status: SHIPPED | OUTPATIENT
Start: 2019-12-21 | End: 2019-12-28

## 2019-12-21 RX ORDER — IBUPROFEN 600 MG/1
600 TABLET ORAL ONCE
Status: COMPLETED | OUTPATIENT
Start: 2019-12-21 | End: 2019-12-21

## 2019-12-21 RX ORDER — AMOXICILLIN 875 MG/1
875 TABLET, COATED ORAL 2 TIMES DAILY
Qty: 10 TABLET | Refills: 0 | Status: SHIPPED | OUTPATIENT
Start: 2019-12-21 | End: 2019-12-26

## 2019-12-21 RX ORDER — HYDROCODONE BITARTRATE AND ACETAMINOPHEN 5; 325 MG/1; MG/1
1 TABLET ORAL ONCE
Status: COMPLETED | OUTPATIENT
Start: 2019-12-21 | End: 2019-12-21

## 2019-12-21 RX ORDER — GLIPIZIDE 5 MG/1
5 TABLET ORAL
Qty: 90 TABLET | Refills: 1 | Status: SHIPPED | OUTPATIENT
Start: 2019-12-21 | End: 2020-02-27

## 2019-12-21 RX ORDER — HYDROCODONE BITARTRATE AND ACETAMINOPHEN 5; 325 MG/1; MG/1
1 TABLET ORAL EVERY 6 HOURS PRN
Qty: 16 TABLET | Refills: 0 | Status: SHIPPED | OUTPATIENT
Start: 2019-12-21 | End: 2019-12-28

## 2019-12-21 NOTE — ED PROVIDER NOTES
Patient Seen in: [facility]      History   Patient presents with:  Dental Problem    Stated Complaint: Toothache     HPI    33-year-old male with past medical history significant for depression, diabetes, high cholesterol, pso Physical Exam    Physical Exam   Constitutional: AAOx3, well nourished, NAD  Head: Normocephalic and atraumatic.    Ears: TM's clear b/l  Nose: Nose normal.   Mouth/Throat: MMM, post OP clear with no exudates, poor dentition, left upper molar is loo

## 2019-12-21 NOTE — TELEPHONE ENCOUNTER
Refill passed per [facility] protocol.     Diabetes Medications  Protocol Criteria:  · Appointment scheduled in the past 6 months or the next 3 months  · A1C < 7.5 in the past 6 months  · Creatinine in the past 12 months  · Creatinine result < 1.5   Re fup    In 2 months Louisa Ball, [address], [place] fup    In 2 months Louisa Ball, [address], [place] fup    In 2 months Sofia Catalan, [address], [place]

## 2020-02-14 ENCOUNTER — TELEPHONE (OUTPATIENT)
Dept: INTERNAL MEDICINE CLINIC | Facility: CLINIC | Age: 50
End: 2020-02-14

## 2020-02-14 NOTE — TELEPHONE ENCOUNTER
Left message to call back. Transfer to triage    Refill request for  SIMVASTATIN 40 MG Oral Tab (Discontinued)    Per 10/4/19 Office visit notes  Hyperlipidemia, unspecified hyperlipidemia type retest patient never started the medication will decide on treatment pending results  Psoriasis stable. I could not find where was restarted.

## 2020-02-27 ENCOUNTER — OFFICE VISIT (OUTPATIENT)
Dept: INTERNAL MEDICINE CLINIC | Facility: CLINIC | Age: 50
End: 2020-02-27
Payer: COMMERCIAL

## 2020-02-27 VITALS
HEART RATE: 71 BPM | SYSTOLIC BLOOD PRESSURE: 138 MMHG | DIASTOLIC BLOOD PRESSURE: 86 MMHG | RESPIRATION RATE: 17 BRPM | OXYGEN SATURATION: 99 % | WEIGHT: 202 LBS | TEMPERATURE: 99 F | BODY MASS INDEX: 28.28 KG/M2 | HEIGHT: 71 IN

## 2020-02-27 DIAGNOSIS — Z00.00 ANNUAL PHYSICAL EXAM: Primary | ICD-10-CM

## 2020-02-27 DIAGNOSIS — E11.9 TYPE 2 DIABETES MELLITUS WITHOUT COMPLICATION, WITH LONG-TERM CURRENT USE OF INSULIN (HCC): ICD-10-CM

## 2020-02-27 DIAGNOSIS — Z79.4 TYPE 2 DIABETES MELLITUS WITHOUT COMPLICATION, WITH LONG-TERM CURRENT USE OF INSULIN (HCC): ICD-10-CM

## 2020-02-27 DIAGNOSIS — F10.11 ALCOHOL ABUSE, IN REMISSION: ICD-10-CM

## 2020-02-27 DIAGNOSIS — L40.9 PSORIASIS: ICD-10-CM

## 2020-02-27 DIAGNOSIS — F33.2 SEVERE RECURRENT MAJOR DEPRESSION WITHOUT PSYCHOTIC FEATURES (HCC): ICD-10-CM

## 2020-02-27 DIAGNOSIS — F17.200 SMOKER: ICD-10-CM

## 2020-02-27 DIAGNOSIS — E78.5 HYPERLIPIDEMIA, UNSPECIFIED HYPERLIPIDEMIA TYPE: ICD-10-CM

## 2020-02-27 DIAGNOSIS — N52.9 ERECTILE DYSFUNCTION, UNSPECIFIED ERECTILE DYSFUNCTION TYPE: ICD-10-CM

## 2020-02-27 DIAGNOSIS — F19.11 HISTORY OF DRUG ABUSE IN REMISSION (HCC): ICD-10-CM

## 2020-02-27 LAB
CHOLEST SMN-MCNC: 167 MG/DL (ref ?–200)
CREAT UR-SCNC: 172 MG/DL
EST. AVERAGE GLUCOSE BLD GHB EST-MCNC: 137 MG/DL (ref 68–126)
HBA1C MFR BLD HPLC: 6.4 % (ref ?–5.7)
HDLC SERPL-MCNC: 36 MG/DL (ref 40–59)
LDLC SERPL CALC-MCNC: 92 MG/DL (ref ?–100)
MICROALBUMIN UR-MCNC: 0.58 MG/DL
MICROALBUMIN/CREAT 24H UR-RTO: 3.4 UG/MG (ref ?–30)
NONHDLC SERPL-MCNC: 131 MG/DL (ref ?–130)
PATIENT FASTING Y/N/NP: YES
TRIGL SERPL-MCNC: 194 MG/DL (ref 30–149)
VLDLC SERPL CALC-MCNC: 39 MG/DL (ref 0–30)

## 2020-02-27 PROCEDURE — 99396 PREV VISIT EST AGE 40-64: CPT | Performed by: INTERNAL MEDICINE

## 2020-02-27 PROCEDURE — 36415 COLL VENOUS BLD VENIPUNCTURE: CPT | Performed by: INTERNAL MEDICINE

## 2020-02-27 RX ORDER — FLUOXETINE 20 MG/1
20 TABLET, FILM COATED ORAL
Qty: 30 TABLET | Refills: 0 | Status: SHIPPED | OUTPATIENT
Start: 2020-02-27 | End: 2020-03-21

## 2020-02-27 NOTE — PROGRESS NOTES
HPI:    Patient ID: Chitra Helm is a 54year old male.     HPI  Patient comes in today for annual physical overall is doing great and his blood sugars have been well controlled last time A1c was 6.7 he has stopped taking the glipizide because of time his sneezing    HISTORY:  Past Medical History:   Diagnosis Date   • Depression    • Diabetes ([facility] [address]    • Eczema    • Hyperlipidemia    • Psoriasis       Past Surgical History:   Procedure Laterality Date   • HERNIA SURGERY        Family History   Problem Relat and vitals reviewed.            ASSESSMENT/PLAN:   Annual physical exam  (primary encounter diagnosis) is okay we will order labs  Type 2 diabetes mellitus without complication, with long-term current use of insulin (hcc) we will retest stop the glipizide d

## 2020-03-06 ENCOUNTER — PATIENT MESSAGE (OUTPATIENT)
Dept: INTERNAL MEDICINE CLINIC | Facility: CLINIC | Age: 50
End: 2020-03-06

## 2020-03-06 NOTE — TELEPHONE ENCOUNTER
From: Gopi White  To: Shivam Del Castillo MD  Sent: 3/6/2020 4:05 AM CST  Subject: Referral Request    Where are my referrals?

## 2020-03-21 RX ORDER — FLUOXETINE 20 MG/1
TABLET, FILM COATED ORAL
Qty: 30 TABLET | Refills: 0 | Status: SHIPPED | OUTPATIENT
Start: 2020-03-21 | End: 2020-04-14

## 2020-03-25 ENCOUNTER — TELEPHONE (OUTPATIENT)
Dept: INTERNAL MEDICINE CLINIC | Facility: CLINIC | Age: 50
End: 2020-03-25

## 2020-03-25 ENCOUNTER — NURSE TRIAGE (OUTPATIENT)
Dept: OTHER | Age: 50
End: 2020-03-25

## 2020-03-25 NOTE — TELEPHONE ENCOUNTER
Action Requested: Summary for Provider     []  Critical Lab, Recommendations Needed  [x] Need Additional Advice  []   FYI    []   Need Orders  [x] Need Medications Sent to Pharmacy  []  Other     SUMMARY:      Does patient need to be seen or have  telephon

## 2020-03-25 NOTE — TELEPHONE ENCOUNTER
Please see pt message below. [employer] message sent to pt to have him call office.

## 2020-03-25 NOTE — TELEPHONE ENCOUNTER
I spoke with the patient he refused virtual visit . He states that he already discussed with the nurse and he does not have any fever he has mild cough that he is taking over-the-counter medication no shortness of breath.   Advised him to keep drinking a l

## 2020-03-25 NOTE — TELEPHONE ENCOUNTER
----- Message from Court Campos sent at 3/25/2020  4:17 PM CDT -----  Regarding: Other  Contact: [phone]  I'd like to see you doc but no appointment till Tuesday?  Not sure if urgent but have had some symptoms of sore throat and a little cough and no

## 2020-03-26 ENCOUNTER — TELEPHONE (OUTPATIENT)
Dept: INTERNAL MEDICINE CLINIC | Facility: CLINIC | Age: 50
End: 2020-03-26

## 2020-03-26 NOTE — TELEPHONE ENCOUNTER
Left message to call back. Advised that we are not seeing patients in the clinic who are having URI symptoms. Advised patient to call back to discuss appt on 3/31/20. CSS --> please transfer to triage.     See Triage encounter 3/25/20 - at [facility] disc

## 2020-03-26 NOTE — TELEPHONE ENCOUNTER
Patient made an appointment  to see Dr. Amelie Frias for please advice    Patient Comments:  Cold symptoms

## 2020-03-30 NOTE — TELEPHONE ENCOUNTER
Can we check with pt see how he is doing, and if he wants to talk with me let him know about the telemedicine charges and if he would agree to it

## 2020-04-13 NOTE — TELEPHONE ENCOUNTER
Left message to call back. Transfer to triage. Patient viewed no phone response letter sent on 4/11/20.

## 2020-04-14 RX ORDER — FLUOXETINE 20 MG/1
TABLET, FILM COATED ORAL
Qty: 30 TABLET | Refills: 0 | Status: SHIPPED | OUTPATIENT
Start: 2020-04-14 | End: 2020-05-05

## 2020-04-14 NOTE — TELEPHONE ENCOUNTER
Spoke to patient. Transferred call to Carlos Eduardo Cole to make a follow up appointment. His appointment is on May 5 @ 11:30.

## 2020-05-05 ENCOUNTER — OFFICE VISIT (OUTPATIENT)
Dept: INTERNAL MEDICINE CLINIC | Facility: CLINIC | Age: 50
End: 2020-05-05
Payer: COMMERCIAL

## 2020-05-05 VITALS
DIASTOLIC BLOOD PRESSURE: 88 MMHG | SYSTOLIC BLOOD PRESSURE: 130 MMHG | HEIGHT: 71 IN | TEMPERATURE: 98 F | WEIGHT: 213.63 LBS | OXYGEN SATURATION: 98 % | BODY MASS INDEX: 29.91 KG/M2 | RESPIRATION RATE: 18 BRPM | HEART RATE: 76 BPM

## 2020-05-05 DIAGNOSIS — Z79.4 TYPE 2 DIABETES MELLITUS WITHOUT COMPLICATION, WITH LONG-TERM CURRENT USE OF INSULIN (HCC): Primary | ICD-10-CM

## 2020-05-05 DIAGNOSIS — Z86.59 HISTORY OF DEPRESSION: ICD-10-CM

## 2020-05-05 DIAGNOSIS — F19.11 HISTORY OF DRUG ABUSE IN REMISSION (HCC): ICD-10-CM

## 2020-05-05 DIAGNOSIS — E11.9 TYPE 2 DIABETES MELLITUS WITHOUT COMPLICATION, WITH LONG-TERM CURRENT USE OF INSULIN (HCC): Primary | ICD-10-CM

## 2020-05-05 DIAGNOSIS — F10.11 ALCOHOL ABUSE, IN REMISSION: ICD-10-CM

## 2020-05-05 DIAGNOSIS — E78.5 HYPERLIPIDEMIA, UNSPECIFIED HYPERLIPIDEMIA TYPE: ICD-10-CM

## 2020-05-05 DIAGNOSIS — Z87.09 HISTORY OF URI (UPPER RESPIRATORY INFECTION): ICD-10-CM

## 2020-05-05 DIAGNOSIS — F17.200 SMOKER: ICD-10-CM

## 2020-05-05 PROCEDURE — 99214 OFFICE O/P EST MOD 30 MIN: CPT | Performed by: INTERNAL MEDICINE

## 2020-05-05 NOTE — PROGRESS NOTES
HPI:    Patient ID: Yany Glez is a 54year old male.     HPI  Patient comes in today for 3 months follow-up overall patient is doing great denies any complaints today he had called about 5 to 6 weeks ago with some upper respiratory symptoms but mild at ITCHING    Comment: Cat dander: Watery eyes, sneezing    HISTORY:  Past Medical History:   Diagnosis Date   • Depression    • Diabetes ([facility] [address]    • Eczema    • Hyperlipidemia    • Psoriasis       Past Surgical History:   Procedure Laterality Date   • HE Judgment and thought content normal.   Nursing note and vitals reviewed.            ASSESSMENT/PLAN:   Type 2 diabetes mellitus without complication, with long-term current use of insulin (hcc)  (primary encounter diagnosis)continue with current care, will

## 2020-05-13 ENCOUNTER — TELEPHONE (OUTPATIENT)
Dept: INTERNAL MEDICINE CLINIC | Facility: CLINIC | Age: 50
End: 2020-05-13

## 2020-05-14 ENCOUNTER — TELEMEDICINE (OUTPATIENT)
Dept: INTERNAL MEDICINE CLINIC | Facility: CLINIC | Age: 50
End: 2020-05-14

## 2020-05-14 DIAGNOSIS — R05.9 COUGH: ICD-10-CM

## 2020-05-14 DIAGNOSIS — J02.9 SORE THROAT: ICD-10-CM

## 2020-05-14 DIAGNOSIS — R19.7 DIARRHEA, UNSPECIFIED TYPE: ICD-10-CM

## 2020-05-14 DIAGNOSIS — M79.10 MYALGIA: ICD-10-CM

## 2020-05-14 DIAGNOSIS — Z20.822 EXPOSURE TO COVID-19 VIRUS: Primary | ICD-10-CM

## 2020-05-14 PROCEDURE — 99214 OFFICE O/P EST MOD 30 MIN: CPT | Performed by: INTERNAL MEDICINE

## 2020-05-14 NOTE — TELEPHONE ENCOUNTER
Action Requested: Summary for Provider     []  Critical Lab, Recommendations Needed  [] Need Additional Advice  []   FYI    []   Need Orders  [] Need Medications Sent to Pharmacy  []  Other     SUMMARY: virtual visit scheduled today with Dr Vinicius Gomez for eval.

## 2020-05-14 NOTE — PROGRESS NOTES
HPI:    Patient ID: Ernestine Calzada is a 54year old male. 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   Denies fever, cough, SOB,    Pt reports headache, diarrhea, body aches.        Review of Systems Social History    Tobacco Use      Smoking status: Light Tobacco Smoker        Packs/day: 0.20        Years: 15.00        Pack years: 3      Smokeless tobacco: Never Used    Alcohol use:  Yes      Alcohol/week: 4.0 standard drinks      Types: 4 Cans of beer

## 2020-05-15 ENCOUNTER — LAB ENCOUNTER (OUTPATIENT)
Dept: LAB | Facility: HOSPITAL | Age: 50
End: 2020-05-15
Attending: INTERNAL MEDICINE
Payer: COMMERCIAL

## 2020-05-15 DIAGNOSIS — M79.10 MYALGIA: ICD-10-CM

## 2020-05-15 DIAGNOSIS — J02.9 SORE THROAT: ICD-10-CM

## 2020-05-15 DIAGNOSIS — Z20.822 EXPOSURE TO COVID-19 VIRUS: ICD-10-CM

## 2020-05-15 DIAGNOSIS — R05.9 COUGH: ICD-10-CM

## 2020-05-15 DIAGNOSIS — R19.7 DIARRHEA, UNSPECIFIED TYPE: ICD-10-CM

## 2020-05-15 RX ORDER — FLUOXETINE 20 MG/1
TABLET ORAL
Qty: 30 TABLET | Refills: 0 | Status: ON HOLD | OUTPATIENT
Start: 2020-05-15 | End: 2020-10-05

## 2020-05-18 ENCOUNTER — VIRTUAL PHONE E/M (OUTPATIENT)
Dept: INTERNAL MEDICINE CLINIC | Facility: CLINIC | Age: 50
End: 2020-05-18
Payer: COMMERCIAL

## 2020-05-18 DIAGNOSIS — Z09 FOLLOW-UP EXAMINATION: Primary | ICD-10-CM

## 2020-05-18 PROCEDURE — 99212 OFFICE O/P EST SF 10 MIN: CPT | Performed by: INTERNAL MEDICINE

## 2020-05-19 NOTE — PROGRESS NOTES
Virtual Telephone Check-In    Cande Kincaid verbally consents to a Virtual/Telephone Check-In visit on 05/18/20. Patient understands and accepts financial responsibility for any deductible, co-insurance and/or co-pays associated with this service.     D

## 2020-06-17 RX ORDER — GLIPIZIDE 5 MG/1
5 TABLET ORAL
Qty: 90 TABLET | Refills: 1 | Status: SHIPPED | OUTPATIENT
Start: 2020-06-17 | End: 2020-07-05

## 2020-06-22 RX ORDER — SILDENAFIL 100 MG/1
100 TABLET, FILM COATED ORAL AS NEEDED
Qty: 9 TABLET | Refills: 3 | Status: ON HOLD | OUTPATIENT
Start: 2020-06-22 | End: 2020-10-05

## 2020-07-02 ENCOUNTER — HOSPITAL ENCOUNTER (OUTPATIENT)
Dept: ULTRASOUND IMAGING | Facility: HOSPITAL | Age: 50
Discharge: HOME | End: 2020-07-02
Attending: INTERNAL MEDICINE
Payer: COMMERCIAL

## 2020-07-02 ENCOUNTER — OFFICE VISIT (OUTPATIENT)
Dept: INTERNAL MEDICINE CLINIC | Facility: CLINIC | Age: 50
End: 2020-07-02
Payer: COMMERCIAL

## 2020-07-02 VITALS
OXYGEN SATURATION: 99 % | DIASTOLIC BLOOD PRESSURE: 74 MMHG | BODY MASS INDEX: 29.54 KG/M2 | WEIGHT: 211 LBS | RESPIRATION RATE: 18 BRPM | HEART RATE: 78 BPM | TEMPERATURE: 99 F | HEIGHT: 71 IN | SYSTOLIC BLOOD PRESSURE: 116 MMHG

## 2020-07-02 DIAGNOSIS — R11.0 NAUSEA: ICD-10-CM

## 2020-07-02 DIAGNOSIS — E78.5 HYPERLIPIDEMIA, UNSPECIFIED HYPERLIPIDEMIA TYPE: ICD-10-CM

## 2020-07-02 DIAGNOSIS — K59.00 CONSTIPATION, UNSPECIFIED CONSTIPATION TYPE: ICD-10-CM

## 2020-07-02 DIAGNOSIS — R10.11 ABDOMINAL PAIN, RUQ: Primary | ICD-10-CM

## 2020-07-02 DIAGNOSIS — Z79.4 TYPE 2 DIABETES MELLITUS WITHOUT COMPLICATION, WITH LONG-TERM CURRENT USE OF INSULIN (HCC): ICD-10-CM

## 2020-07-02 DIAGNOSIS — R10.11 ABDOMINAL PAIN, RUQ: ICD-10-CM

## 2020-07-02 DIAGNOSIS — E11.9 TYPE 2 DIABETES MELLITUS WITHOUT COMPLICATION, WITH LONG-TERM CURRENT USE OF INSULIN (HCC): ICD-10-CM

## 2020-07-02 LAB
ALBUMIN SERPL-MCNC: 4.1 G/DL (ref 3.4–5)
ALBUMIN/GLOB SERPL: 1.1 {RATIO} (ref 1–2)
ALP LIVER SERPL-CCNC: 97 U/L (ref 45–117)
ALT SERPL-CCNC: 35 U/L (ref 16–61)
ANION GAP SERPL CALC-SCNC: 6 MMOL/L (ref 0–18)
AST SERPL-CCNC: 21 U/L (ref 15–37)
BASOPHILS # BLD AUTO: 0.05 X10(3) UL (ref 0–0.2)
BASOPHILS NFR BLD AUTO: 0.8 %
BILIRUB SERPL-MCNC: 0.6 MG/DL (ref 0.1–2)
BUN BLD-MCNC: 8 MG/DL (ref 7–18)
BUN/CREAT SERPL: 7.6 (ref 10–20)
CALCIUM BLD-MCNC: 8.9 MG/DL (ref 8.5–10.1)
CHLORIDE SERPL-SCNC: 102 MMOL/L (ref 98–112)
CHOLEST SMN-MCNC: 124 MG/DL (ref ?–200)
CO2 SERPL-SCNC: 28 MMOL/L (ref 21–32)
CREAT BLD-MCNC: 1.05 MG/DL (ref 0.7–1.3)
DEPRECATED RDW RBC AUTO: 36.9 FL (ref 35.1–46.3)
EOSINOPHIL # BLD AUTO: 0.25 X10(3) UL (ref 0–0.7)
EOSINOPHIL NFR BLD AUTO: 4 %
ERYTHROCYTE [DISTWIDTH] IN BLOOD BY AUTOMATED COUNT: 11.7 % (ref 11–15)
EST. AVERAGE GLUCOSE BLD GHB EST-MCNC: 177 MG/DL (ref 68–126)
GLOBULIN PLAS-MCNC: 3.7 G/DL (ref 2.8–4.4)
GLUCOSE BLD-MCNC: 173 MG/DL (ref 70–99)
HBA1C MFR BLD HPLC: 7.8 % (ref ?–5.7)
HCT VFR BLD AUTO: 43.2 % (ref 39–53)
HDLC SERPL-MCNC: 28 MG/DL (ref 40–59)
HGB BLD-MCNC: 15.4 G/DL (ref 13–17.5)
IMM GRANULOCYTES # BLD AUTO: 0.03 X10(3) UL (ref 0–1)
IMM GRANULOCYTES NFR BLD: 0.5 %
LDLC SERPL CALC-MCNC: 62 MG/DL (ref ?–100)
LYMPHOCYTES # BLD AUTO: 1.93 X10(3) UL (ref 1–4)
LYMPHOCYTES NFR BLD AUTO: 30.8 %
M PROTEIN MFR SERPL ELPH: 7.8 G/DL (ref 6.4–8.2)
MCH RBC QN AUTO: 30.8 PG (ref 26–34)
MCHC RBC AUTO-ENTMCNC: 35.6 G/DL (ref 31–37)
MCV RBC AUTO: 86.4 FL (ref 80–100)
MONOCYTES # BLD AUTO: 0.59 X10(3) UL (ref 0.1–1)
MONOCYTES NFR BLD AUTO: 9.4 %
NEUTROPHILS # BLD AUTO: 3.42 X10 (3) UL (ref 1.5–7.7)
NEUTROPHILS # BLD AUTO: 3.42 X10(3) UL (ref 1.5–7.7)
NEUTROPHILS NFR BLD AUTO: 54.5 %
NONHDLC SERPL-MCNC: 96 MG/DL (ref ?–130)
OSMOLALITY SERPL CALC.SUM OF ELEC: 284 MOSM/KG (ref 275–295)
PATIENT FASTING Y/N/NP: YES
PATIENT FASTING Y/N/NP: YES
PLATELET # BLD AUTO: 252 10(3)UL (ref 150–450)
POTASSIUM SERPL-SCNC: 4.4 MMOL/L (ref 3.5–5.1)
RBC # BLD AUTO: 5 X10(6)UL (ref 4.3–5.7)
SODIUM SERPL-SCNC: 136 MMOL/L (ref 136–145)
TRIGL SERPL-MCNC: 168 MG/DL (ref 30–149)
VLDLC SERPL CALC-MCNC: 34 MG/DL (ref 0–30)
WBC # BLD AUTO: 6.3 X10(3) UL (ref 4–11)

## 2020-07-02 PROCEDURE — 36415 COLL VENOUS BLD VENIPUNCTURE: CPT | Performed by: INTERNAL MEDICINE

## 2020-07-02 PROCEDURE — 99215 OFFICE O/P EST HI 40 MIN: CPT | Performed by: INTERNAL MEDICINE

## 2020-07-02 PROCEDURE — 76705 ECHO EXAM OF ABDOMEN: CPT | Performed by: INTERNAL MEDICINE

## 2020-07-02 RX ORDER — ONDANSETRON 4 MG/1
4 TABLET, FILM COATED ORAL EVERY 8 HOURS PRN
Qty: 20 TABLET | Refills: 0 | Status: ON HOLD | OUTPATIENT
Start: 2020-07-02 | End: 2020-10-05

## 2020-07-02 RX ORDER — PANTOPRAZOLE SODIUM 40 MG/1
40 TABLET, DELAYED RELEASE ORAL
Qty: 30 TABLET | Refills: 2 | Status: SHIPPED | OUTPATIENT
Start: 2020-07-02 | End: 2021-08-16

## 2020-07-02 RX ORDER — ACETAMINOPHEN AND CODEINE PHOSPHATE 300; 30 MG/1; MG/1
1 TABLET ORAL EVERY 6 HOURS PRN
Qty: 30 TABLET | Refills: 0 | Status: ON HOLD | OUTPATIENT
Start: 2020-07-02 | End: 2021-05-20

## 2020-07-02 NOTE — PROGRESS NOTES
HPI:    Patient ID: Sundar Lund is a 54year old male.     HPI  Patient comes in today with complaint of abdominal pain right upper quadrant to mid epigastric and at times feels in the back as per patient in the past has been told that his gallbladder is Ondansetron HCl (ZOFRAN) 4 mg tablet Take 1 tablet (4 mg total) by mouth every 8 (eight) hours as needed for Nausea. 20 tablet 0   • Sildenafil Citrate (VIAGRA) 100 MG Oral Tab Take 1 tablet (100 mg total) by mouth as needed for Erectile Dysfunction.  9 tab Normal rate, regular rhythm, normal heart sounds and intact distal pulses. Exam reveals no friction rub. No murmur heard. Pulmonary/Chest: Effort normal and breath sounds normal. No respiratory distress. He has no wheezes. He has no rales.    Abdominal: Ondansetron HCl (ZOFRAN) 4 mg tablet 20 tablet 0     Sig: Take 1 tablet (4 mg total) by mouth every 8 (eight) hours as needed for Nausea.        Imaging & Referrals:  None        RADHA#[id number]

## 2020-08-14 ENCOUNTER — MED REC SCAN ONLY (OUTPATIENT)
Dept: INTERNAL MEDICINE CLINIC | Facility: CLINIC | Age: 50
End: 2020-08-14

## 2020-08-25 ENCOUNTER — TELEPHONE (OUTPATIENT)
Dept: INTERNAL MEDICINE CLINIC | Facility: CLINIC | Age: 50
End: 2020-08-25

## 2020-08-25 NOTE — TELEPHONE ENCOUNTER
MESSAGE:   RE:  Statin Therapy    We spoke to you patient about DM care, and noticed your patient was previously received statin therapy but has not filled at a [facility] in the last 180days.    Your patient asked us to reach out to your on their behalf

## 2020-08-25 NOTE — TELEPHONE ENCOUNTER
Spoke to patient, states he stopped cholesterol meds as his cholesterol wasn't too bad and has just been monitoring this via diet control. Last lipid 7/2/20  Cholesterol  124  HDL        28  Triglycerides 168  LDL         62    Please advise.

## 2020-10-01 ENCOUNTER — TELEMEDICINE (OUTPATIENT)
Dept: INTERNAL MEDICINE CLINIC | Facility: CLINIC | Age: 50
End: 2020-10-01
Payer: COMMERCIAL

## 2020-10-01 ENCOUNTER — TELEPHONE (OUTPATIENT)
Dept: INTERNAL MEDICINE CLINIC | Facility: CLINIC | Age: 50
End: 2020-10-01

## 2020-10-01 DIAGNOSIS — K29.51 CHRONIC GASTRITIS WITH BLEEDING, UNSPECIFIED GASTRITIS TYPE: ICD-10-CM

## 2020-10-01 DIAGNOSIS — R10.13 EPIGASTRIC PAIN: Primary | ICD-10-CM

## 2020-10-01 PROCEDURE — 99214 OFFICE O/P EST MOD 30 MIN: CPT | Performed by: INTERNAL MEDICINE

## 2020-10-01 RX ORDER — SUCRALFATE 1 G/1
1 TABLET ORAL
Qty: 30 TABLET | Refills: 0 | Status: ON HOLD | OUTPATIENT
Start: 2020-10-01 | End: 2020-10-05

## 2020-10-01 NOTE — TELEPHONE ENCOUNTER
Pt stated he has started having the same s/s with abdominal pain , was treated for s/s in July, neg US,neg lab - decline appt, [facility] appt made , Pt requesting pain meds

## 2020-10-01 NOTE — PROGRESS NOTES
HPI:    Patient ID: Kiera Perkins is a 52year old male.     HPI  Patient seen today through life 2 way video visit he is complaining of abdominal pain as per patient is been going on for few weeks now midepigastric to right upper quadrant similar pain las Oral Tab EC Take 1 tablet (40 mg total) by mouth every morning before breakfast. 30 tablet 2   • Acetaminophen-Codeine (TYLENOL WITH CODEINE #3) 300-30 MG Oral Tab Take 1 tablet by mouth every 6 (six) hours as needed for Pain.  30 tablet 0   • Ondansetron H

## 2020-10-02 ENCOUNTER — TELEPHONE (OUTPATIENT)
Dept: INTERNAL MEDICINE CLINIC | Facility: CLINIC | Age: 50
End: 2020-10-02

## 2020-10-02 ENCOUNTER — PATIENT MESSAGE (OUTPATIENT)
Dept: INTERNAL MEDICINE CLINIC | Facility: CLINIC | Age: 50
End: 2020-10-02

## 2020-10-03 ENCOUNTER — HOSPITAL ENCOUNTER (INPATIENT)
Facility: HOSPITAL | Age: 50
LOS: 2 days | Discharge: HOME | DRG: 440 | End: 2020-10-05
Attending: EMERGENCY MEDICINE | Admitting: INTERNAL MEDICINE
Payer: COMMERCIAL

## 2020-10-03 ENCOUNTER — APPOINTMENT (OUTPATIENT)
Dept: CT IMAGING | Facility: HOSPITAL | Age: 50
DRG: 440 | End: 2020-10-03
Attending: EMERGENCY MEDICINE
Payer: COMMERCIAL

## 2020-10-03 DIAGNOSIS — K85.90 ACUTE PANCREATITIS WITHOUT INFECTION OR NECROSIS, UNSPECIFIED PANCREATITIS TYPE: Primary | ICD-10-CM

## 2020-10-03 PROCEDURE — 99222 1ST HOSP IP/OBS MODERATE 55: CPT | Performed by: INTERNAL MEDICINE

## 2020-10-03 PROCEDURE — 74177 CT ABD & PELVIS W/CONTRAST: CPT | Performed by: EMERGENCY MEDICINE

## 2020-10-03 PROCEDURE — 99254 IP/OBS CNSLTJ NEW/EST MOD 60: CPT | Performed by: INTERNAL MEDICINE

## 2020-10-03 RX ORDER — MORPHINE SULFATE 2 MG/ML
2 INJECTION, SOLUTION INTRAMUSCULAR; INTRAVENOUS EVERY 4 HOURS PRN
Status: DISCONTINUED | OUTPATIENT
Start: 2020-10-03 | End: 2020-10-05

## 2020-10-03 RX ORDER — METHOCARBAMOL 100 MG/ML
1 INJECTION, SOLUTION INTRAMUSCULAR; INTRAVENOUS ONCE
Status: COMPLETED | OUTPATIENT
Start: 2020-10-03 | End: 2020-10-03

## 2020-10-03 RX ORDER — MORPHINE SULFATE 4 MG/ML
4 INJECTION, SOLUTION INTRAMUSCULAR; INTRAVENOUS EVERY 30 MIN PRN
Status: ACTIVE | OUTPATIENT
Start: 2020-10-03 | End: 2020-10-03

## 2020-10-03 RX ORDER — ORPHENADRINE CITRATE 30 MG/ML
60 INJECTION INTRAMUSCULAR; INTRAVENOUS ONCE
Status: DISCONTINUED | OUTPATIENT
Start: 2020-10-03 | End: 2020-10-03

## 2020-10-03 RX ORDER — ONDANSETRON 2 MG/ML
4 INJECTION INTRAMUSCULAR; INTRAVENOUS ONCE
Status: COMPLETED | OUTPATIENT
Start: 2020-10-03 | End: 2020-10-03

## 2020-10-03 RX ORDER — ENOXAPARIN SODIUM 100 MG/ML
40 INJECTION SUBCUTANEOUS DAILY
Status: DISCONTINUED | OUTPATIENT
Start: 2020-10-03 | End: 2020-10-05

## 2020-10-03 RX ORDER — SODIUM CHLORIDE 9 MG/ML
INJECTION, SOLUTION INTRAVENOUS CONTINUOUS
Status: DISCONTINUED | OUTPATIENT
Start: 2020-10-03 | End: 2020-10-05

## 2020-10-03 RX ORDER — ONDANSETRON 2 MG/ML
4 INJECTION INTRAMUSCULAR; INTRAVENOUS EVERY 6 HOURS PRN
Status: DISCONTINUED | OUTPATIENT
Start: 2020-10-03 | End: 2020-10-05

## 2020-10-03 RX ORDER — MORPHINE SULFATE 4 MG/ML
4 INJECTION, SOLUTION INTRAMUSCULAR; INTRAVENOUS ONCE
Status: COMPLETED | OUTPATIENT
Start: 2020-10-03 | End: 2020-10-03

## 2020-10-03 RX ORDER — ONDANSETRON 2 MG/ML
4 INJECTION INTRAMUSCULAR; INTRAVENOUS EVERY 4 HOURS PRN
Status: DISCONTINUED | OUTPATIENT
Start: 2020-10-03 | End: 2020-10-05

## 2020-10-03 RX ORDER — ONDANSETRON 2 MG/ML
INJECTION INTRAMUSCULAR; INTRAVENOUS
Status: COMPLETED
Start: 2020-10-03 | End: 2020-10-03

## 2020-10-03 RX ORDER — SODIUM CHLORIDE, SODIUM LACTATE, POTASSIUM CHLORIDE, CALCIUM CHLORIDE 600; 310; 30; 20 MG/100ML; MG/100ML; MG/100ML; MG/100ML
INJECTION, SOLUTION INTRAVENOUS CONTINUOUS
Status: DISCONTINUED | OUTPATIENT
Start: 2020-10-03 | End: 2020-10-05

## 2020-10-03 RX ORDER — MORPHINE SULFATE 2 MG/ML
2 INJECTION, SOLUTION INTRAMUSCULAR; INTRAVENOUS EVERY 6 HOURS PRN
Status: DISCONTINUED | OUTPATIENT
Start: 2020-10-03 | End: 2020-10-03

## 2020-10-03 RX ORDER — DEXTROSE MONOHYDRATE 25 G/50ML
50 INJECTION, SOLUTION INTRAVENOUS
Status: DISCONTINUED | OUTPATIENT
Start: 2020-10-03 | End: 2020-10-05

## 2020-10-03 RX ORDER — DEXTROSE AND SODIUM CHLORIDE 5; .45 G/100ML; G/100ML
INJECTION, SOLUTION INTRAVENOUS CONTINUOUS
Status: ACTIVE | OUTPATIENT
Start: 2020-10-03 | End: 2020-10-03

## 2020-10-03 RX ORDER — HYDROCODONE BITARTRATE AND ACETAMINOPHEN 5; 325 MG/1; MG/1
1 TABLET ORAL EVERY 6 HOURS PRN
Status: DISCONTINUED | OUTPATIENT
Start: 2020-10-03 | End: 2020-10-05

## 2020-10-03 NOTE — PLAN OF CARE
Patient is alert and oriented. Vital signs stable. Complains of severe pain. Tolerating NPO diet with ice chips and sips with meds. Norco and Morphine for pain control. Ambulating independently. Lactated Ringers IV fluid infusing.   Lovenox and SCD's for DV

## 2020-10-03 NOTE — H&P
[facility]    History and Physical    Ana Rosa Bakari Patient Status:  Inpatient    9/15/1970 MRN [id number]   Location [facility] 5SW/SE Attending Ananya Hoover MD   Hosp Day # 0 PCP Cathryn Oppenheim, MD     Date:  10/3/2020  Date of MEALS    •  Multiple Vitamin (MULTI-VITAMIN DAILY) Oral Tab, Take by mouth. •  sucralfate 1 g Oral Tab, Take 1 tablet (1 g total) by mouth 4 (four) times daily before meals and nightly.  (Patient not taking: Reported on 10/3/2020 )    •  glipiZIDE 5 MG O 97 %.    Nursing note and vitals reviewed. Constitutional: He is oriented to person, place, and time. He appears well-developed and well-nourished. HENT:   Head: Normocephalic and atraumatic. Cardiovascular: Normal rate and regular rhythm.   Exam reve Dictated by (CST): Violet Rodney MD on 10/03/2020 at 7:50 AM     Finalized by (CST): Violet Rodney MD on 10/03/2020 at 7:55 AM          Ekg 12-lead    Result Date: 10/3/2020  ECG Report  Interpretation  --------------------------       Assessment/Plan:

## 2020-10-03 NOTE — TELEPHONE ENCOUNTER
Mao message sent. RN=call and triage. Had telephone visit yesterday 10/1/20 due to epigastric pain.        ----- Message from Indio Mccoy sent at 10/2/2020  8:39 PM CDT -----  Regarding: Visit Follow-up Question  Contact: [phone]  Need to see y

## 2020-10-03 NOTE — CONSULTS
[facility]   Gastroenterology Consultation Note    Indio Teriyuliana  Patient Status:    Inpatient  Date of Admission:         10/3/2020, Hospital day #0  Attending:   Danny Coffey MD  PCP:     Devon Henry MD    Reason for [facility] week. He reports previous drug use.     Allergies:    Dander                  ITCHING    Comment: Cat dander: Watery eyes, sneezing    Medications:    Current Facility-Administered Medications:   •  ondansetron HCl (ZOFRAN) injection 4 mg, 4 mg, Intravenous pressure 124/77, pulse 60, temperature 97.8 °F (36.6 °C), temperature source Oral, resp. rate 16, height 5' 11\" (1.803 m), weight 206 lb (93.4 kg), SpO2 97 %. Body mass index is 28.73 kg/m².     Gen- Patient appears comfortable and in no acute discomfort of alcohol abuse, smoker, DM, depression, HL, who presents with epigastric pain, imaging c/w pancreatitis.     #Acute pancreatitis- suspect related to underlying chronic pancreatitis (from prior alcohol abuse, although no drinking in 11 months), and ongoing

## 2020-10-03 NOTE — ED PROVIDER NOTES
Patient Seen in: [facility] AND [facility] Emergency Department    History   Patient presents with:  Abdomen/Flank Pain    Stated Complaint: abd pain      HPI    61-year-old male with past medical history of diabetes, dyslipidemia presenting for evaluation of kelly Nausea. Patient not taking: Reported on 10/3/2020    Sildenafil Citrate (VIAGRA) 100 MG Oral Tab,  Take 1 tablet (100 mg total) by mouth as needed for Erectile Dysfunction.   Patient not taking: Reported on 10/3/2020    FLUOXETINE HCL, PMDD, 20 MG Oral Tab No distress. HEENT: MMM. Head: Normocephalic. Eyes: No injection. Cardiovascular: RRR. Bilateral lower extremities with intact/equal DP and PT pulses. Pulmonary/Chest: Effort normal. CTAB. Abdominal: Soft.   Mild epigastric tenderness without shakir orders. URINALYSIS WITH CULTURE REFLEX   RAINBOW DRAW BLUE   RAINBOW DRAW LAVENDER   RAINBOW DRAW LIGHT GREEN   RAINBOW DRAW GOLD   CBC W/ DIFFERENTIAL     EKG    Rate, intervals and axes as noted on EKG Report.   Rate: 70  Rhythm: Sinus Rhythm  Reading: printouts.          MDM     DIFFERENTIAL DIAGNOSIS: After history and physical exam differential diagnosis includes but is not limited to ACS, gastritis, pancreatitis, biliary colic, cholecystitis, choledocholithiasis, cholangitis, aortic pathology, rhabdom

## 2020-10-03 NOTE — ED NOTES
Orders for admission, patient is aware of plan and ready to go upstairs. Any questions, please call ED RN Amelie Kenny  at [address]    Type of COVID test sent:Rapid  COVID Suspicion level: Low    Titratable drug(s) infusing:none  Rate:    LOC at time o

## 2020-10-03 NOTE — ED INITIAL ASSESSMENT (HPI)
epigastric pain since Monday with bloating and nausea. States the pain goes through to his shoulder blades.

## 2020-10-03 NOTE — TELEPHONE ENCOUNTER
Dr Ann=patient admitted at [facility]. Leanora Dunker [facility] Problem List       Acute pancreatitis without infection or necrosis     Acute pancreatitis without infection or necrosis, unspecified pancreatitis type

## 2020-10-04 PROCEDURE — 99232 SBSQ HOSP IP/OBS MODERATE 35: CPT | Performed by: INTERNAL MEDICINE

## 2020-10-04 RX ORDER — POTASSIUM CHLORIDE 20 MEQ/1
40 TABLET, EXTENDED RELEASE ORAL EVERY 4 HOURS
Status: COMPLETED | OUTPATIENT
Start: 2020-10-04 | End: 2020-10-04

## 2020-10-04 RX ORDER — ACETAMINOPHEN 325 MG/1
650 TABLET ORAL EVERY 6 HOURS PRN
Status: DISCONTINUED | OUTPATIENT
Start: 2020-10-04 | End: 2020-10-05

## 2020-10-04 NOTE — PROGRESS NOTES
[facility]    Progress Note    Pinehurstkisha Collazo Patient Status:  Inpatient    9/15/1970 MRN [id number]   Location [facility] 5SW/SE Attending Cesar Black MD   Hosp Day # 1 PCP Saleem Alexis MD        Subjective:     Jiantio He displays normal reflexes. No cranial nerve deficit, sensory deficit or motor deficit. Coordination and gait normal.   Skin: Skin is warm. Psychiatric: He has a normal mood and affect.            Assessment and Plan:       Acute pancreatitis without inf

## 2020-10-04 NOTE — PLAN OF CARE
Patient is alert and oriented. Vital signs stable. Headache this morning and has mild abdominal pain. Tolerating low fiber/low fat soft diet. Norco, Morphine and Tylenol for pain control. Ambulating independently.  Lactated Ringers IV fluid infusing at 150m for specific interventions  Outcome: Progressing

## 2020-10-04 NOTE — PROGRESS NOTES
[facility]     Gastroenterology Progress Note    Remyjojoash Albanias Patient Status:  Inpatient    9/15/1970 MRN [id number]   Location [facility] 5SW/SE Attending Brady Garza MD   Hosp Day # 1 PCP MD Leonor Dallas underlying pancreatic lesions    Anticipate d/c possibly tomorrow. Nadeen Gunn, [address] [facility] Gastroenterology      Results:     Lab Results   Component Value Date    WBC 7.2 10/04/2020    HGB 16.1 10/04/2020    HCT 45.5 10/04/2020    .

## 2020-10-04 NOTE — PLAN OF CARE
Problem: Patient Centered Care  Goal: Patient preferences are identified and integrated in the patient's plan of care  Description: Interventions:  - What would you like us to know as we care for you?  \"I work at [employer]  - Provide timely, complete, and

## 2020-10-05 ENCOUNTER — TELEPHONE (OUTPATIENT)
Dept: GASTROENTEROLOGY | Facility: CLINIC | Age: 50
End: 2020-10-05

## 2020-10-05 VITALS
WEIGHT: 206 LBS | TEMPERATURE: 98 F | HEART RATE: 62 BPM | BODY MASS INDEX: 28.84 KG/M2 | DIASTOLIC BLOOD PRESSURE: 82 MMHG | HEIGHT: 71 IN | SYSTOLIC BLOOD PRESSURE: 135 MMHG | OXYGEN SATURATION: 95 % | RESPIRATION RATE: 18 BRPM

## 2020-10-05 PROBLEM — K85.90 ACUTE PANCREATITIS WITHOUT INFECTION OR NECROSIS (HCC): Status: RESOLVED | Noted: 2020-10-03 | Resolved: 2020-10-05

## 2020-10-05 PROBLEM — K85.90 ACUTE PANCREATITIS WITHOUT INFECTION OR NECROSIS, UNSPECIFIED PANCREATITIS TYPE (HCC): Status: RESOLVED | Noted: 2020-10-03 | Resolved: 2020-10-05

## 2020-10-05 PROBLEM — E11.9 TYPE 2 DIABETES MELLITUS WITHOUT COMPLICATION, WITHOUT LONG-TERM CURRENT USE OF INSULIN (HCC): Status: ACTIVE | Noted: 2019-01-13

## 2020-10-05 PROBLEM — K85.90 ACUTE PANCREATITIS WITHOUT INFECTION OR NECROSIS, UNSPECIFIED PANCREATITIS TYPE: Status: RESOLVED | Noted: 2020-10-03 | Resolved: 2020-10-05

## 2020-10-05 PROBLEM — K85.90 ACUTE PANCREATITIS WITHOUT INFECTION OR NECROSIS: Status: RESOLVED | Noted: 2020-10-03 | Resolved: 2020-10-05

## 2020-10-05 PROCEDURE — 99239 HOSP IP/OBS DSCHRG MGMT >30: CPT | Performed by: INTERNAL MEDICINE

## 2020-10-05 PROCEDURE — 99233 SBSQ HOSP IP/OBS HIGH 50: CPT | Performed by: PHYSICIAN ASSISTANT

## 2020-10-05 NOTE — DIABETES ED
Patient educated regarding diabetes diet basics. Encouraged to attend outpatient diabetes education. Questions answered. On MTF 1000 BID; hemoglobin A1c trending up from 02/2020.

## 2020-10-05 NOTE — DISCHARGE SUMMARY
DISCHARGE SUMMARY     Ally Pritchett Patient Status:  Inpatient    9/15/1970 MRN [id number]   Location [facility] 5SW/SE Attending Blank Bailey MD   Hosp Day # 2 PCP Latanya Sheffield MD     Date of Admission: 10/3/2020  Date of Discharge: 10/05/20 results pending at Discharge:   ·     Consultants:  •     Discharge Medication List:     Discharge Medications      CONTINUE taking these medications      Instructions Prescription details   Acetaminophen-Codeine 300-30 MG Tabs  Commonly known as: Tylenol [place] [address]  [phone]      [facility] [address] WITHIN 2-3 DAYS OF DISCHARGE FOR A DIABETES MANAGEMENT FOLLOW UP APPT.  IN 1-2 WEEKS      Vital signs:  Temp:  [97.3 °F (36.3 °C)-98.8 °F (37.1 °C)] 97.8 °F (36.6 °C)  Pulse:  [58-63] 62

## 2020-10-05 NOTE — PROGRESS NOTES
[facility]     Gastroenterology Progress Note    Elikatelinann Males Patient Status:  Inpatient    9/15/1970 MRN [id number]   Location [facility] 5SW/SE Attending Caitlyn Diaz MD   Hosp Day # 2 PCP Margeret Claude, MD Fara Hum ensure no underlying pancreatic lesions  -outpatient GI f/u with Kimberly BLACKBURN  -see above    Case discussed with GI on-call, Dr. Isabella Felty and RN Guevara.  40 minute follow up today with >20 minutes spent in face-to-face discussion with the patient reg

## 2020-10-05 NOTE — TELEPHONE ENCOUNTER
INPATIENT ORDERS:  - outpatient f/u with Roshan Green in 3-4 weeks for hospital f/u for acute pancreatitis  - needs MRCP in 8 weeks which can be ordered at f/u  - colonoscopy screening discussion  - see last progress note    Thank you

## 2020-10-05 NOTE — PLAN OF CARE
Problem: Patient Centered Care  Goal: Patient preferences are identified and integrated in the patient's plan of care  Description: Interventions:  - What would you like us to know as we care for you?  \"I work at [employer]  - Provide timely, complete, and

## 2020-10-05 NOTE — PAYOR COMM NOTE
--------------  ADMISSION REVIEW     Payor: [address]  Subscriber #:  [id number]  Authorization Number: [id number]     Admit date: 10/3/20  Admit time: 1169       Admitting Physician: Richard Borja MD  Attending Physician:  Richard Borja 1 tablet (40 mg total) by mouth every morning before breakfast.  Patient not taking: Reported on 10/3/2020    Acetaminophen-Codeine (TYLENOL WITH CODEINE #3) 300-30 MG Oral Tab,  Take 1 tablet by mouth every 6 (six) hours as needed for Pain.   Patient not t tenderness without peritonitis. Musculoskeletal: No gross deformity. No midline thoracolumbar tenderness/step-off/deformity. No midline thoracolumbar tenderness/step-off/deformity. Mild bilateral paralumbar spasm without cutaneous change.   Neurological Rhythm  Reading: NSR 70 without ST change         Past Medical History (entered by Technologist):    Reason For Exam (entered by Technologist):  abd pain / nausea  Other Notes (entered by Technologist): er pod 3 rm38    Additional Information (per Vision R abdominal pain with soft/non-peritonitic abdomen. Lipase borderline, EKG/troponin unremarkable and CT obtained demonstrating acute pancreatitis. Pain ongoing, will admit for ongoing management/IV fluids.   Primary care Dr. Harriet Gaines, graciously admitted to [facility] nausea        Allergies/Medications:    Allergies:   Dander                  ITCHING    Comment: Cat dander: Watery eyes, sneezing    •  METFORMIN HCL 1000 MG Oral Tab, TAKE 1 TABLET BY MOUTH TWICE A DAY WITH MEALS    •  Multiple Vitamin (MULTI-VITAMIN ANA Psychiatric/Behavioral: Negative. Physical Exam:   Vital Signs:  Blood pressure 124/77, pulse 60, temperature 97.8 °F (36.6 °C), temperature source Oral, resp. rate 16, height 5' 11\" (1.803 m), weight 206 lb (93.4 kg), SpO2 97 %.     Nursing note necrosis, or main pancreatic ductal dilatation. 2. Hepatic steatosis. 3. Lesser incidental findings as above. A preliminary report was issued by the [address] [place] [address] Radiology teleradiology service. There are no major discrepancies.    Dictated by (CST): Zahra Hercules,    Constitutional: Negative for activity change, appetite change, chills, diaphoresis, fatigue, fever and unexpected weight change. HENT: Negative. Eyes: Negative.     Respiratory: Negative for apnea, cough, choking, chest tightness, shortness of deni pancreatitis without infection or necrosis, unspecified pancreatitis type  Lipase trending down, will start diet and advance as tolerated      Dm type 2 continue with accucheck, sliding scale insulin, hypoglycemia protocol   gi and dvt prophylaxis        R

## 2020-10-05 NOTE — PLAN OF CARE
Patient is alert and oriented. Vital signs stable. Denies pain. Tolerating soft diet. Ambulates independently. Discharged per MD orders. IV removed. AVS discharge paperwork given and educated prior to leaving.  Patient was escorted to ED parking lot where h

## 2020-10-05 NOTE — PAYOR COMM NOTE
--------------  CONTINUED STAY REVIEW    Payor: [facility]  Subscriber #:  [id number]  Authorization Number: [id number]     Admit date: 10/3/20  Admit time: 8984    Admitting Physician: France Palm MD  Attending Physician:  No att. provide outpatient evaluation      Recommend:  -OK from GI perspective for D/C today  -MRCP in 8 weeks to ensure no underlying pancreatic lesions  -outpatient GI f/u with Sara BLACKBURN  -see above     Case discussed with GI on-call, Dr. Ronni Ling and FABBY Amato 10/5/2020    10/4/2020 2053 Given 5 mg Oral Abeba Gasca RN      Potassium Chloride ER (K-DUR M20) CR tab 40 mEq     Date Action Dose Route User    Discharged on 10/5/2020    10/4/2020 1335 Given 40 mEq Oral Lucas Rivera RN

## 2020-10-06 ENCOUNTER — PATIENT OUTREACH (OUTPATIENT)
Dept: CASE MANAGEMENT | Age: 50
End: 2020-10-06

## 2020-10-06 ENCOUNTER — TELEPHONE (OUTPATIENT)
Dept: MEDSURG UNIT | Facility: HOSPITAL | Age: 50
End: 2020-10-06

## 2020-10-06 DIAGNOSIS — Z02.9 ENCOUNTERS FOR ADMINISTRATIVE PURPOSE: ICD-10-CM

## 2020-10-06 PROCEDURE — 1111F DSCHRG MED/CURRENT MED MERGE: CPT

## 2020-10-06 NOTE — PAYOR COMM NOTE
--------------  DISCHARGE REVIEW    Payor: [address] [facility]  Subscriber #:  [id number]  Authorization Number: [id number]     Admit date: 10/3/20  Admit time:  4754  Discharge Date: 10/5/2020 10:40 AM     Admitting Physician: MD Oscar Watts Synopsis:     Discharge Physical Exam: General appearance: alert, appears stated age and cooperative  Throat: lips, mucosa, and tongue normal; teeth and gums normal  Neck: no adenopathy, no carotid bruit, no JVD, supple, symmetrical, trachea midline and th - Unchanged    METFORMIN HCL 1000 MG Oral Tab  TAKE 1 TABLET BY MOUTH TWICE A DAY WITH MEALS    Multiple Vitamin (MULTI-VITAMIN DAILY) Oral Tab  Take by mouth.     glipiZIDE 5 MG Oral Tab  Take 1 tablet (5 mg total) by mouth 2 (two) times daily before meals

## 2020-10-07 RX ORDER — OMEPRAZOLE 20 MG/1
20 CAPSULE, DELAYED RELEASE ORAL
Status: ON HOLD | COMMUNITY
End: 2021-05-20

## 2020-10-07 NOTE — PROGRESS NOTES
Initial Post Discharge Follow Up   Discharge Date: 10/5/20  Contact Date: 10/6/2020    Consent Verification:  Assessment Completed With: Patient  HIPAA Verified?   Yes    Discharge Dx:   Acute pancreatitis without infection or necrosis, unspecified pancr hospital? yes  • (NCM) If a new medication was prescribed:  n/a  • May I go over your medications with you to make sure we are not missing anything? yes  • Are there any reasons that keep you from taking your medication as prescribed?  No  Are you having any medications with patient,  and orders reviewed and discussed. Any changes or updates to medications and or orders sent to PCP.

## 2020-10-13 ENCOUNTER — OFFICE VISIT (OUTPATIENT)
Dept: INTERNAL MEDICINE CLINIC | Facility: CLINIC | Age: 50
End: 2020-10-13
Payer: COMMERCIAL

## 2020-10-13 VITALS — BODY MASS INDEX: 29.26 KG/M2 | TEMPERATURE: 98 F | WEIGHT: 209 LBS | HEIGHT: 71 IN

## 2020-10-13 DIAGNOSIS — Z09 HOSPITAL DISCHARGE FOLLOW-UP: Primary | ICD-10-CM

## 2020-10-13 DIAGNOSIS — K85.90 RECURRENT PANCREATITIS: ICD-10-CM

## 2020-10-13 DIAGNOSIS — E11.65 TYPE 2 DIABETES MELLITUS WITH HYPERGLYCEMIA, WITHOUT LONG-TERM CURRENT USE OF INSULIN (HCC): ICD-10-CM

## 2020-10-13 PROCEDURE — 99495 TRANSJ CARE MGMT MOD F2F 14D: CPT | Performed by: INTERNAL MEDICINE

## 2020-10-13 PROCEDURE — 3008F BODY MASS INDEX DOCD: CPT | Performed by: INTERNAL MEDICINE

## 2020-10-13 PROCEDURE — 1111F DSCHRG MED/CURRENT MED MERGE: CPT | Performed by: INTERNAL MEDICINE

## 2020-10-13 NOTE — PROGRESS NOTES
HPI:    Patient ID: Court Campos is a 52year old male. HPI  Patient comes in today for follow-up after visiting the hospital with acute pancreatitis he is doing much better no abdominal pain.   Also A1c was found to be elevated he says he will be more Mother    • Cancer Sister       Social History: Social History    Tobacco Use      Smoking status: Light Tobacco Smoker        Packs/day: 0.20        Years: 15.00        Pack years: 3      Smokeless tobacco: Never Used    Alcohol use: Not Currently      Al diet continue with Metformin we will follow in 1 month we will recheck A1c    No orders of the defined types were placed in this encounter.       Meds This Visit:  Requested Prescriptions      No prescriptions requested or ordered in this encounter       Im

## 2020-10-23 NOTE — TELEPHONE ENCOUNTER
Pt contacted and reviewed PB message below.  Pt verbalized understanding and accepted the following appt, directions provided to the [facility], and told to arrive 15 mins earlier:  Future Appointments   Date Time Provider Cheikh Mcdermott   10/26/2020 11:00 AM

## 2020-10-24 NOTE — H&P
[facility] - Gastroenterology                                                                                                               Reason for consult: hospital f/u     Requesting physician or provider: Blank Mosqueda MD    Patient presents with drugs: no    No FH GI malignancy  No FH pancreatic/biliary disease    Last colonoscopy: no  Last EGD: no    Wt Readings from Last 6 Encounters:  10/26/20 : 209 lb (94.8 kg)  10/13/20 : 209 lb (94.8 kg)  10/03/20 : 206 lb (93.4 kg)  07/02/20 : 211 lb (95.7 needed for Pain. 30 tablet 0   • Multiple Vitamin (MULTI-VITAMIN DAILY) Oral Tab Take by mouth.          Allergies:    Dander                  ITCHING    Comment: Cat dander: Watery eyes, sneezing    ROS:   CONSTITUTIONAL: negative for fevers, chills, sweat Lymphocyte Absolute 2.05 1.00 - 4.00 x10(3) uL    Monocyte Absolute 0.60 0.10 - 1.00 x10(3) uL    Eosinophil Absolute 0.18 0.00 - 0.70 x10(3) uL    Basophil Absolute 0.07 0.00 - 0.20 x10(3) uL    Immature Granulocyte Absolute 0.05 0.00 - 1.00 x10(3) uL psoriasis:    #pancreatitis  He is here today for f/u s/p episode of acute pancreatitis. He has h/o etoh abuse and sober x last year. Is current smoker and discussed how use may contributing to attacks and progression to chronic pancreatitis.   Recommended in this encounter       Imaging & Referrals:  MRI MRCP (W+WO) (FWF=[id number])      Radha No, APRN   10/24/2020        This note was partially prepared using [employer] voice recognition dictation software. As a result, errors may occur.  When cata

## 2020-10-26 ENCOUNTER — LAB ENCOUNTER (OUTPATIENT)
Dept: LAB | Facility: HOSPITAL | Age: 50
End: 2020-10-26
Attending: NURSE PRACTITIONER
Payer: COMMERCIAL

## 2020-10-26 ENCOUNTER — OFFICE VISIT (OUTPATIENT)
Dept: GASTROENTEROLOGY | Facility: CLINIC | Age: 50
End: 2020-10-26
Payer: COMMERCIAL

## 2020-10-26 VITALS
HEART RATE: 85 BPM | DIASTOLIC BLOOD PRESSURE: 98 MMHG | SYSTOLIC BLOOD PRESSURE: 144 MMHG | HEIGHT: 71 IN | WEIGHT: 209 LBS | BODY MASS INDEX: 29.26 KG/M2

## 2020-10-26 DIAGNOSIS — K21.9 GASTROESOPHAGEAL REFLUX DISEASE, UNSPECIFIED WHETHER ESOPHAGITIS PRESENT: ICD-10-CM

## 2020-10-26 DIAGNOSIS — K85.90 ACUTE PANCREATITIS WITHOUT INFECTION OR NECROSIS, UNSPECIFIED PANCREATITIS TYPE: Primary | ICD-10-CM

## 2020-10-26 DIAGNOSIS — K85.90 ACUTE PANCREATITIS WITHOUT INFECTION OR NECROSIS, UNSPECIFIED PANCREATITIS TYPE: ICD-10-CM

## 2020-10-26 DIAGNOSIS — K59.00 CONSTIPATION, UNSPECIFIED CONSTIPATION TYPE: ICD-10-CM

## 2020-10-26 DIAGNOSIS — Z87.09 PERSONAL HISTORY OF UNSPECIFIED DISEASE OF RESPIRATORY SYSTEM: Primary | ICD-10-CM

## 2020-10-26 PROCEDURE — 36415 COLL VENOUS BLD VENIPUNCTURE: CPT

## 2020-10-26 PROCEDURE — 82787 IGG 1 2 3 OR 4 EACH: CPT

## 2020-10-26 PROCEDURE — 86769 SARS-COV-2 COVID-19 ANTIBODY: CPT

## 2020-10-26 PROCEDURE — 80053 COMPREHEN METABOLIC PANEL: CPT

## 2020-10-26 PROCEDURE — 3077F SYST BP >= 140 MM HG: CPT | Performed by: NURSE PRACTITIONER

## 2020-10-26 PROCEDURE — 3080F DIAST BP >= 90 MM HG: CPT | Performed by: NURSE PRACTITIONER

## 2020-10-26 PROCEDURE — 85025 COMPLETE CBC W/AUTO DIFF WBC: CPT

## 2020-10-26 PROCEDURE — 99244 OFF/OP CNSLTJ NEW/EST MOD 40: CPT | Performed by: NURSE PRACTITIONER

## 2020-10-26 PROCEDURE — 3008F BODY MASS INDEX DOCD: CPT | Performed by: NURSE PRACTITIONER

## 2020-10-26 NOTE — PATIENT INSTRUCTIONS
-miralax one capfful  -omeprazole 20 mg/daily  -stop smoking  -reflux diet modification  -mrcp  -labs  -avoid alcohol  -low fat diet    Tips to Control Acid Reflux    To control acid reflux, you’ll need to make some basic diet and lifestyle changes.  The si

## 2020-11-03 RX ORDER — SILDENAFIL 100 MG/1
TABLET, FILM COATED ORAL
Qty: 6 TABLET | Refills: 5 | Status: SHIPPED | OUTPATIENT
Start: 2020-11-03 | End: 2021-08-17

## 2020-11-05 PROBLEM — R53.83 TIREDNESS: Status: ACTIVE | Noted: 2018-11-01

## 2020-11-18 ENCOUNTER — TELEPHONE (OUTPATIENT)
Dept: INTERNAL MEDICINE CLINIC | Facility: CLINIC | Age: 50
End: 2020-11-18

## 2020-11-18 NOTE — TELEPHONE ENCOUNTER
Patient Review of Clinical Information      Problems    The patient or proxy has not reviewed this information, and there are updates pending:     Requested Problem Removals Date Noted Reported By  Comments    Foreskin problem 1/11/2019 Keyona Narvaez

## 2020-11-25 ENCOUNTER — TELEPHONE (OUTPATIENT)
Dept: GASTROENTEROLOGY | Facility: CLINIC | Age: 50
End: 2020-11-25

## 2021-01-27 RX ORDER — GLIPIZIDE 5 MG/1
TABLET ORAL
Qty: 90 TABLET | Refills: 1 | Status: SHIPPED | OUTPATIENT
Start: 2021-01-27 | End: 2021-02-16 | Stop reason: ALTCHOICE

## 2021-02-17 ENCOUNTER — TELEPHONE (OUTPATIENT)
Dept: INTERNAL MEDICINE CLINIC | Facility: CLINIC | Age: 51
End: 2021-02-17

## 2021-02-17 NOTE — TELEPHONE ENCOUNTER
[employer] message sent to patient.     Patient Review of Clinical Information    Problems   The patient or proxy has not reviewed this information, and there are updates pending:   Requested Problem Removals Date Noted Reported By  Comments   Foreskin

## 2021-02-19 ENCOUNTER — OFFICE VISIT (OUTPATIENT)
Dept: INTERNAL MEDICINE CLINIC | Facility: CLINIC | Age: 51
End: 2021-02-19
Payer: COMMERCIAL

## 2021-02-19 VITALS
RESPIRATION RATE: 17 BRPM | BODY MASS INDEX: 28.14 KG/M2 | SYSTOLIC BLOOD PRESSURE: 126 MMHG | DIASTOLIC BLOOD PRESSURE: 72 MMHG | WEIGHT: 201 LBS | HEART RATE: 68 BPM | HEIGHT: 71 IN | TEMPERATURE: 98 F | OXYGEN SATURATION: 99 %

## 2021-02-19 DIAGNOSIS — F10.11 ALCOHOL ABUSE, IN REMISSION: ICD-10-CM

## 2021-02-19 DIAGNOSIS — Z86.59 HISTORY OF DEPRESSION: ICD-10-CM

## 2021-02-19 DIAGNOSIS — E11.9 TYPE 2 DIABETES MELLITUS WITHOUT COMPLICATION, WITHOUT LONG-TERM CURRENT USE OF INSULIN (HCC): Primary | ICD-10-CM

## 2021-02-19 DIAGNOSIS — F19.11 HISTORY OF DRUG ABUSE IN REMISSION (HCC): ICD-10-CM

## 2021-02-19 DIAGNOSIS — L40.9 PSORIASIS: ICD-10-CM

## 2021-02-19 DIAGNOSIS — E78.5 HYPERLIPIDEMIA, UNSPECIFIED HYPERLIPIDEMIA TYPE: ICD-10-CM

## 2021-02-19 LAB
CHOLEST SMN-MCNC: 158 MG/DL (ref ?–200)
EST. AVERAGE GLUCOSE BLD GHB EST-MCNC: 171 MG/DL (ref 68–126)
HBA1C MFR BLD HPLC: 7.6 % (ref ?–5.7)
HDLC SERPL-MCNC: 30 MG/DL (ref 40–59)
LDLC SERPL CALC-MCNC: 73 MG/DL (ref ?–100)
NONHDLC SERPL-MCNC: 128 MG/DL (ref ?–130)
PATIENT FASTING Y/N/NP: YES
TRIGL SERPL-MCNC: 274 MG/DL (ref 30–149)
VLDLC SERPL CALC-MCNC: 55 MG/DL (ref 0–30)

## 2021-02-19 PROCEDURE — 36415 COLL VENOUS BLD VENIPUNCTURE: CPT | Performed by: INTERNAL MEDICINE

## 2021-02-19 PROCEDURE — 99214 OFFICE O/P EST MOD 30 MIN: CPT | Performed by: INTERNAL MEDICINE

## 2021-02-19 PROCEDURE — 3008F BODY MASS INDEX DOCD: CPT | Performed by: INTERNAL MEDICINE

## 2021-02-19 PROCEDURE — 3074F SYST BP LT 130 MM HG: CPT | Performed by: INTERNAL MEDICINE

## 2021-02-19 PROCEDURE — 3078F DIAST BP <80 MM HG: CPT | Performed by: INTERNAL MEDICINE

## 2021-02-19 PROCEDURE — 3051F HG A1C>EQUAL 7.0%<8.0%: CPT | Performed by: INTERNAL MEDICINE

## 2021-02-22 NOTE — PROGRESS NOTES
HPI:    Patient ID: Kenya Fritz is a 52year old male.     HPI  Patient comes in for follow-up overall doing okay denies any new complaints today except for history of rash on his hands getting worst due to psoriasis     Review of Systems   Constitutiona Constitutional: He is oriented to person, place, and time. He appears well-developed and well-nourished. HENT:   Head: Normocephalic and atraumatic. Eyes: Pupils are equal, round, and reactive to light.  Conjunctivae and EOM are normal.   Neck: Normal

## 2021-03-03 DIAGNOSIS — Z23 NEED FOR VACCINATION: ICD-10-CM

## 2021-04-08 NOTE — LETTER
23          Jerlyn Aase  :  9/15/1970      To Whom It May Concern: This patient was seen in the hospital from 23- 23 . Work status: May return to work full-time, no restrictions    May return to work status per above effective 23. If this office may be of further assistance, please do not hesitate to contact us.       Sincerely,        Tacos Goff MD 

## 2021-05-15 ENCOUNTER — APPOINTMENT (OUTPATIENT)
Dept: CT IMAGING | Facility: HOSPITAL | Age: 51
End: 2021-05-15
Attending: EMERGENCY MEDICINE
Payer: COMMERCIAL

## 2021-05-15 ENCOUNTER — NURSE TRIAGE (OUTPATIENT)
Dept: INTERNAL MEDICINE CLINIC | Facility: CLINIC | Age: 51
End: 2021-05-15

## 2021-05-15 ENCOUNTER — HOSPITAL ENCOUNTER (EMERGENCY)
Facility: HOSPITAL | Age: 51
Discharge: HOME | End: 2021-05-15
Attending: EMERGENCY MEDICINE
Payer: COMMERCIAL

## 2021-05-15 VITALS
RESPIRATION RATE: 18 BRPM | DIASTOLIC BLOOD PRESSURE: 90 MMHG | BODY MASS INDEX: 29.82 KG/M2 | HEART RATE: 74 BPM | TEMPERATURE: 98 F | HEIGHT: 71 IN | WEIGHT: 213 LBS | SYSTOLIC BLOOD PRESSURE: 128 MMHG | OXYGEN SATURATION: 96 %

## 2021-05-15 DIAGNOSIS — K85.80 OTHER ACUTE PANCREATITIS WITHOUT INFECTION OR NECROSIS: Primary | ICD-10-CM

## 2021-05-15 DIAGNOSIS — E11.65 TYPE 2 DIABETES MELLITUS WITH HYPERGLYCEMIA, WITHOUT LONG-TERM CURRENT USE OF INSULIN (HCC): ICD-10-CM

## 2021-05-15 PROCEDURE — 80048 BASIC METABOLIC PNL TOTAL CA: CPT | Performed by: EMERGENCY MEDICINE

## 2021-05-15 PROCEDURE — 83690 ASSAY OF LIPASE: CPT

## 2021-05-15 PROCEDURE — 81001 URINALYSIS AUTO W/SCOPE: CPT | Performed by: EMERGENCY MEDICINE

## 2021-05-15 PROCEDURE — 80076 HEPATIC FUNCTION PANEL: CPT | Performed by: EMERGENCY MEDICINE

## 2021-05-15 PROCEDURE — 99284 EMERGENCY DEPT VISIT MOD MDM: CPT

## 2021-05-15 PROCEDURE — 74177 CT ABD & PELVIS W/CONTRAST: CPT | Performed by: EMERGENCY MEDICINE

## 2021-05-15 PROCEDURE — 80048 BASIC METABOLIC PNL TOTAL CA: CPT

## 2021-05-15 PROCEDURE — 96361 HYDRATE IV INFUSION ADD-ON: CPT

## 2021-05-15 PROCEDURE — 82962 GLUCOSE BLOOD TEST: CPT

## 2021-05-15 PROCEDURE — 83690 ASSAY OF LIPASE: CPT | Performed by: EMERGENCY MEDICINE

## 2021-05-15 PROCEDURE — 96374 THER/PROPH/DIAG INJ IV PUSH: CPT

## 2021-05-15 PROCEDURE — 80076 HEPATIC FUNCTION PANEL: CPT

## 2021-05-15 PROCEDURE — 85025 COMPLETE CBC W/AUTO DIFF WBC: CPT | Performed by: EMERGENCY MEDICINE

## 2021-05-15 PROCEDURE — 85025 COMPLETE CBC W/AUTO DIFF WBC: CPT

## 2021-05-15 PROCEDURE — 96376 TX/PRO/DX INJ SAME DRUG ADON: CPT

## 2021-05-15 RX ORDER — MORPHINE SULFATE 4 MG/ML
4 INJECTION, SOLUTION INTRAMUSCULAR; INTRAVENOUS ONCE
Status: COMPLETED | OUTPATIENT
Start: 2021-05-15 | End: 2021-05-15

## 2021-05-15 RX ORDER — INSULIN ASPART 100 [IU]/ML
0.15 INJECTION, SOLUTION INTRAVENOUS; SUBCUTANEOUS ONCE
Status: COMPLETED | OUTPATIENT
Start: 2021-05-15 | End: 2021-05-15

## 2021-05-15 RX ORDER — ACETAMINOPHEN AND CODEINE PHOSPHATE 300; 30 MG/1; MG/1
1-2 TABLET ORAL EVERY 4 HOURS PRN
Qty: 20 TABLET | Refills: 0 | Status: ON HOLD | OUTPATIENT
Start: 2021-05-15 | End: 2021-05-20

## 2021-05-15 NOTE — TELEPHONE ENCOUNTER
Action Requested: Summary for Provider     []  Critical Lab, Recommendations Needed  [x] Need Additional Advice  []   FYI    []   Need Orders  [x] Need Medications Sent to Pharmacy  []  Other     SUMMARY: patient calling after working all night.  Per daniela

## 2021-05-15 NOTE — TELEPHONE ENCOUNTER
Advised patient of Dr. Colton Peraza note. Patient verbalized understanding and just pulled up to [address] [facility] ER now.

## 2021-05-15 NOTE — ED PROVIDER NOTES
Patient Seen in: [facility] AND [facility] Emergency Department      History   Patient presents with:  Abdomen/Flank Pain    Stated Complaint: Pancreatitis     HPI/Subjective:   HPI    The patient is a 81-year-old male with a history of alcohol abuse sober for 1 Appearance: He is well-developed. He is not ill-appearing. HENT:      Head: Normocephalic and atraumatic.       Mouth/Throat:      Mouth: Mucous membranes are moist.   Eyes:      Conjunctiva/sclera: Conjunctivae normal.      Pupils: Pupils are equal, roun components within normal limits   POCT GLUCOSE - Abnormal; Notable for the following components:    POC Glucose  239 (*)     All other components within normal limits   CBC W/ DIFFERENTIAL - Abnormal; Notable for the following components:    RDW-SD 34.5 Ulysess Clas (CST): Gleen Oppenheim, MD on 5/15/2021 at 1:50 PM     Finalized by (CST): Gleen Oppenheim, MD on 5/15/2021 at 1:55 PM            Radiology exams  Viewed and reviewed by myself and findings discussed with patient including need for follow up

## 2021-05-15 NOTE — ED INITIAL ASSESSMENT (HPI)
Patient aox3 to [employer] private vehicle patient co of ruq abd pain radiating to back x yesterday hx of pancreatitis, states feels the same +nausea.

## 2021-05-15 NOTE — TELEPHONE ENCOUNTER
Due to previous history of pancreatitis and I see that he is diabetic he needs to go to emergency room he needs to get evaluated we cannot prescribe painkillers without knowing the source of the pain/we do want to mask abdominal pain without knowing the so

## 2021-05-17 ENCOUNTER — HOSPITAL ENCOUNTER (INPATIENT)
Facility: HOSPITAL | Age: 51
LOS: 2 days | Discharge: HOME | DRG: 440 | End: 2021-05-20
Attending: EMERGENCY MEDICINE | Admitting: INTERNAL MEDICINE
Payer: COMMERCIAL

## 2021-05-17 DIAGNOSIS — K85.90 ACUTE PANCREATITIS, UNSPECIFIED COMPLICATION STATUS, UNSPECIFIED PANCREATITIS TYPE: Primary | ICD-10-CM

## 2021-05-17 RX ORDER — MORPHINE SULFATE 4 MG/ML
4 INJECTION, SOLUTION INTRAMUSCULAR; INTRAVENOUS ONCE
Status: COMPLETED | OUTPATIENT
Start: 2021-05-17 | End: 2021-05-17

## 2021-05-18 PROBLEM — K85.90 ACUTE PANCREATITIS, UNSPECIFIED COMPLICATION STATUS, UNSPECIFIED PANCREATITIS TYPE (HCC): Status: ACTIVE | Noted: 2021-05-18

## 2021-05-18 PROBLEM — K85.90 ACUTE PANCREATITIS, UNSPECIFIED COMPLICATION STATUS, UNSPECIFIED PANCREATITIS TYPE: Status: ACTIVE | Noted: 2021-05-18

## 2021-05-18 PROCEDURE — 99254 IP/OBS CNSLTJ NEW/EST MOD 60: CPT | Performed by: INTERNAL MEDICINE

## 2021-05-18 PROCEDURE — 99222 1ST HOSP IP/OBS MODERATE 55: CPT | Performed by: INTERNAL MEDICINE

## 2021-05-18 RX ORDER — MELATONIN
9 NIGHTLY PRN
Status: DISCONTINUED | OUTPATIENT
Start: 2021-05-18 | End: 2021-05-20

## 2021-05-18 RX ORDER — DEXTROSE MONOHYDRATE 25 G/50ML
50 INJECTION, SOLUTION INTRAVENOUS
Status: DISCONTINUED | OUTPATIENT
Start: 2021-05-18 | End: 2021-05-20

## 2021-05-18 RX ORDER — ONDANSETRON 2 MG/ML
4 INJECTION INTRAMUSCULAR; INTRAVENOUS EVERY 6 HOURS PRN
Status: DISCONTINUED | OUTPATIENT
Start: 2021-05-18 | End: 2021-05-20

## 2021-05-18 RX ORDER — HEPARIN SODIUM 5000 [USP'U]/ML
5000 INJECTION, SOLUTION INTRAVENOUS; SUBCUTANEOUS EVERY 8 HOURS SCHEDULED
Status: DISCONTINUED | OUTPATIENT
Start: 2021-05-18 | End: 2021-05-20

## 2021-05-18 RX ORDER — SODIUM CHLORIDE 9 MG/ML
INJECTION, SOLUTION INTRAVENOUS CONTINUOUS
Status: DISCONTINUED | OUTPATIENT
Start: 2021-05-18 | End: 2021-05-18

## 2021-05-18 RX ORDER — DEXTROSE MONOHYDRATE 25 G/50ML
50 INJECTION, SOLUTION INTRAVENOUS
Status: DISCONTINUED | OUTPATIENT
Start: 2021-05-18 | End: 2021-05-18

## 2021-05-18 RX ORDER — KETOROLAC TROMETHAMINE 30 MG/ML
30 INJECTION, SOLUTION INTRAMUSCULAR; INTRAVENOUS ONCE
Status: COMPLETED | OUTPATIENT
Start: 2021-05-18 | End: 2021-05-18

## 2021-05-18 RX ORDER — MORPHINE SULFATE 4 MG/ML
4 INJECTION, SOLUTION INTRAMUSCULAR; INTRAVENOUS ONCE
Status: COMPLETED | OUTPATIENT
Start: 2021-05-18 | End: 2021-05-18

## 2021-05-18 RX ORDER — SODIUM CHLORIDE, SODIUM LACTATE, POTASSIUM CHLORIDE, CALCIUM CHLORIDE 600; 310; 30; 20 MG/100ML; MG/100ML; MG/100ML; MG/100ML
INJECTION, SOLUTION INTRAVENOUS CONTINUOUS
Status: DISCONTINUED | OUTPATIENT
Start: 2021-05-18 | End: 2021-05-20

## 2021-05-18 RX ORDER — MORPHINE SULFATE 2 MG/ML
2 INJECTION, SOLUTION INTRAMUSCULAR; INTRAVENOUS EVERY 4 HOURS PRN
Status: DISCONTINUED | OUTPATIENT
Start: 2021-05-18 | End: 2021-05-20

## 2021-05-18 NOTE — PLAN OF CARE
A&Ox4. Pain in the right upper quadrant- gave IVP morphine. IV fluids. IV protonix. NPO. Q6 accuchecks. Call light within reach, will continue to monitor.      Problem: Patient Centered Care  Goal: Patient preferences are identified and integrated in interventions unsuccessful or patient reports new pain  - Anticipate increased pain with activity and pre-medicate as appropriate  Outcome: Progressing     Problem: Diabetes/Glucose Control  Goal: Glucose maintained within prescribed range  Description: IN

## 2021-05-18 NOTE — PAYOR COMM NOTE
--------------  ADMISSION REVIEW     Pay[employer]: [employer]  Subscriber #:  [id number]  Authorization Number: [id number]     Admit date: 5/18/21  Admit time:  1:51 AM       Admitting Physician: Sindy Mauricio MD  Attending Physician:  Davie Zuleta reviewed. All other systems reviewed and negative except as noted above.     Physical Exam     ED Triage Vitals [05/17/21 2027]   /88   Pulse 102   Resp 18   Temp 97.9 °F (36.6 °C)   Temp src Temporal   SpO2 97 %   O2 Device None (Room air) 0.70 - 1.30 mg/dL    BUN/CREA Ratio 9.9 (L) 10.0 - 20.0    Calcium, Total 9.5 8.5 - 10.1 mg/dL    Calculated Osmolality 287 275 - 295 mOsm/kg    GFR, Non- 78 >=60    GFR, -American 90 >=60   CBC W/ DIFFERENTIAL    Collection Time: 05 Imaging Results Available and Reviewed by me while in ED:  No results found. EMERGENCY DEPARTMENT COURSE AND TREATMENT:  Patient's condition was stable during Emergency Department evaluation.      50yoM with epigastric pain  - I personally review Acute pancreatitis K85.90 5/17/2021 Unknown                 Signed by Amelia Morrow MD on 5/18/2021  6:49 AM           MEDICATIONS ADMINISTERED IN LAST 1 DAY:  Heparin Sodium (Porcine) 5000 UNIT/ML injection 5,000 Units     Date Action Dose Route User Date Action Dose Route User    5/17/2021 2242 New Bag 1,000 mL Intravenous Olvin Weller, RN        5/18  Assessment & Plan   Roger Rm is a a(n) 52year old male w/ a history of alcohol use disorder, smoker, DM, depression, who presents with

## 2021-05-18 NOTE — ED INITIAL ASSESSMENT (HPI)
Pt to ED for c/o epigastric pain radiating to right back. Pt reports this is similar to his \"pancreatic pain\". Pt was here two days ago for same. Denies ETOH use. +nasuea, denies vomiting.

## 2021-05-18 NOTE — ED QUICK NOTES
Orders for admission, patient is aware of plan and ready to go upstairs.  Any questions, please call ED FABBY Masters  at extension [phone]   Type of COVID test sent: Rapid - negative   COVID Suspicion level: Low    Titratable drug(s) infusing: none  Rate:    LOC

## 2021-05-18 NOTE — ED PROVIDER NOTES
Patient Seen in: [facility] AND CLINICS 1w      History   Patient presents with:  Abdomen/Flank Pain    Stated Complaint: Abdominal pain    HPI/Subjective:   HPI    79-year-old male with history of diabetes, hyperlipidemia, previous alcoholism, here with comp negative. Positive for stated complaint: Abdominal pain  Other systems are as noted in HPI. Constitutional and vital signs reviewed. All other systems reviewed and negative except as noted above.     Physical Exam     ED Triage Vitals [05/17/21 2 Chloride 98 98 - 112 mmol/L    CO2 24.0 21.0 - 32.0 mmol/L    Anion Gap 9 0 - 18 mmol/L    BUN 11 7 - 18 mg/dL    Creatinine 1.11 0.70 - 1.30 mg/dL    BUN/CREA Ratio 9.9 (L) 10.0 - 20.0    Calcium, Total 9.5 8.5 - 10.1 mg/dL    Calculated Osmolality 287 27 70 - 99 mg/dL   POCT GLUCOSE    Collection Time: 05/18/21  6:03 AM   Result Value Ref Range    POC Glucose  238 (H) 70 - 99 mg/dL       Imaging Results Available and Reviewed by me while in ED:  No results found.       EMERGENCY DEPARTMENT COURSE AND TREATM status, unspecified pancreatitis type  (primary encounter diagnosis)     Disposition:  Admit  5/17/2021 11:22 pm    Follow-up:  No follow-up provider specified.         Medications Prescribed:  Current Discharge Medication List                          [facility]

## 2021-05-18 NOTE — CONSULTS
[facility]   Gastroenterology Consultation Note    Edis Rocyuliana  Patient Status:    Inpatient  Date of Admission:         5/17/2021, Hospital day #0  Attending:   Sindy Mauriico MD  PCP:     Hugo Snyder MD    Reason for Consultation: drug use.     Allergies:    Dander                  ITCHING    Comment: Cat dander: Watery eyes, sneezing    Medications:    Current Facility-Administered Medications:   •  morphINE sulfate (PF) 2 MG/ML injection 2 mg, 2 mg, Intravenous, Q4H PRN  •  Heparin 2  Acetaminophen-Codeine (TYLENOL WITH CODEINE #3) 300-30 MG Oral Tab, Take 1 tablet by mouth every 6 (six) hours as needed for Pain., Disp: 30 tablet, Rfl: 0        Review of Systems:  CONSTITUTIONAL:  negative for fevers, rigors  EYES:  negative for dipl ONLY)(CPT=74177)    Result Date: 5/15/2021  CONCLUSION:   Acute pancreatitis without necrosis, abscess or peripancreatic collection.   Short-term follow-up MRCP within without contrast if patient can tolerate contrast is recommended after resolution of jean paul

## 2021-05-18 NOTE — PLAN OF CARE
IV FLUIDS, INSULIN COVERAGE, CLEAR LIQUID DIET, PAIN MEDICATION, PLAN FOR ULTRASOUND OF THE ABDOMEN TOMORROW MORNING,     Problem: Patient/Family Goals  Goal: Patient/Family Long Term Goal  Description: Patient's Long Term Goal: \"To find out what is causi to monitor pain and request assistance  - Assess pain using appropriate pain scale  - Administer analgesics based on type and severity of pain and evaluate response  - Implement non-pharmacological measures as appropriate and evaluate response  - Consider

## 2021-05-19 ENCOUNTER — APPOINTMENT (OUTPATIENT)
Dept: MRI IMAGING | Facility: HOSPITAL | Age: 51
DRG: 440 | End: 2021-05-19
Attending: INTERNAL MEDICINE
Payer: COMMERCIAL

## 2021-05-19 ENCOUNTER — APPOINTMENT (OUTPATIENT)
Dept: ULTRASOUND IMAGING | Facility: HOSPITAL | Age: 51
DRG: 440 | End: 2021-05-19
Attending: INTERNAL MEDICINE
Payer: COMMERCIAL

## 2021-05-19 PROCEDURE — 74183 MRI ABD W/O CNTR FLWD CNTR: CPT | Performed by: INTERNAL MEDICINE

## 2021-05-19 PROCEDURE — 76376 3D RENDER W/INTRP POSTPROCES: CPT | Performed by: INTERNAL MEDICINE

## 2021-05-19 PROCEDURE — 99232 SBSQ HOSP IP/OBS MODERATE 35: CPT | Performed by: INTERNAL MEDICINE

## 2021-05-19 PROCEDURE — 76705 ECHO EXAM OF ABDOMEN: CPT | Performed by: INTERNAL MEDICINE

## 2021-05-19 NOTE — PAYOR COMM NOTE
--------------  5/19 CONTINUED STAY REVIEW    Payor: [employer] [facility] PPO  Subscriber #:  [id number]  Authorization Number: [id number]     GI:       States abd pain has completely resolved this morning. No nausea or emesis. No fever or chills.  Is hungry RN    5/18/2021 1010 Given 2 Units Subcutaneous (Right Lower Abdomen) Monie Sam RN      Insulin Aspart Pen (NOVOLOG) 100 UNIT/ML flexpen 4 Units     Date Action Dose Route User    5/18/2021 2126 Given 4 Units Subcutaneous (Right Upper Arm) Selina Breaux

## 2021-05-19 NOTE — PLAN OF CARE
Problem: Patient Centered Care  Goal: Patient preferences are identified and integrated in the patient's plan of care  Description: Interventions:  - What would you like us to know as we care for you?  From home, was recently admitted and was discharged 5 Joseluis Bartlett RN  Outcome: Progressing  5/18/2021 2232 by Tino Mendez RN  Outcome: Progressing     Problem: PAIN - ADULT  Goal: Verbalizes/displays adequate comfort level or patient's stated pain goal  Description: INTERVENTIONS:  - Encourage pt to monitor heat packs. NPO at 0000 for US abdomin. Insulin protocol. IVF LR at 200. VSS. Will continue to monitor.

## 2021-05-19 NOTE — PROGRESS NOTES
[facility]    Progress Note    Roger Gold Patient Status:  Inpatient    9/15/1970 MRN [id number]   Location [facility] 1W Attending Wenceslao Cain MD   Hosp Day # 1 PCP Cande Gordon MD     HPI/Subjective:     HENT: Negative follow GI is on board  We will get an ultrasound      Gastroesophageal reflux disease continue home meds        History of alcohol use patient denies any alcohol since last October is going to meetings        Biliary sludge we will follow ultrasound result

## 2021-05-19 NOTE — PROGRESS NOTES
[facility]     Gastroenterology Progress Note    Silelsy Ruiz Patient Status:  Inpatient    9/15/1970 MRN [id number]   Location [facility] 1W Attending Tory Calderón MD   Hosp Day # 1 PCP Juan Godfrey MD       Subjective 05/17/2021     (L) 05/17/2021    K 4.2 05/17/2021    CL 98 05/17/2021    CO2 24.0 05/17/2021     (H) 05/17/2021    CA 9.5 05/17/2021    ALB 3.9 05/17/2021    ALKPHO 109 05/17/2021    BILT 0.8 05/17/2021    TP 8.6 (H) 05/17/2021    AST 20 05/17

## 2021-05-20 ENCOUNTER — TELEPHONE (OUTPATIENT)
Dept: INTERNAL MEDICINE CLINIC | Facility: CLINIC | Age: 51
End: 2021-05-20

## 2021-05-20 ENCOUNTER — TELEPHONE (OUTPATIENT)
Dept: GASTROENTEROLOGY | Facility: CLINIC | Age: 51
End: 2021-05-20

## 2021-05-20 VITALS
WEIGHT: 218.31 LBS | OXYGEN SATURATION: 96 % | SYSTOLIC BLOOD PRESSURE: 128 MMHG | DIASTOLIC BLOOD PRESSURE: 82 MMHG | TEMPERATURE: 98 F | RESPIRATION RATE: 18 BRPM | HEART RATE: 61 BPM | BODY MASS INDEX: 30 KG/M2

## 2021-05-20 PROBLEM — K85.90 ACUTE PANCREATITIS (HCC): Status: RESOLVED | Noted: 2021-05-17 | Resolved: 2021-05-20

## 2021-05-20 PROBLEM — K85.90 ACUTE PANCREATITIS: Status: RESOLVED | Noted: 2021-05-17 | Resolved: 2021-05-20

## 2021-05-20 PROCEDURE — 99239 HOSP IP/OBS DSCHRG MGMT >30: CPT | Performed by: INTERNAL MEDICINE

## 2021-05-20 PROCEDURE — 99232 SBSQ HOSP IP/OBS MODERATE 35: CPT | Performed by: INTERNAL MEDICINE

## 2021-05-20 NOTE — PROGRESS NOTES
[facility]     Gastroenterology Progress Note    Ally Grade Patient Status:  Inpatient    9/15/1970 MRN [id number]   Location [facility] 1W Attending Eliane Arreaga MD   Hosp Day # 2 PCP Latanya Sheffield MD       Subjective MD  [facility] Gastroenterology      Results:     Lab Results   Component Value Date    WBC 4.8 05/20/2021    HGB 14.7 05/20/2021    HCT 42.3 05/20/2021    .0 05/20/2021    CREATSERUM 0.97 05/20/2021    BUN 9 05/20/2021     05/20/2021

## 2021-05-20 NOTE — H&P
[facility]    History and Physical    Gopi White Patient Status:  Inpatient    9/15/1970 MRN [id number]   Location [facility] 1W Attending Tang Rodriguez MD   Hosp Day # 2 PCP Shivam Del Castillo MD     Date:  2021  Date of Adm Tab, Take 1-2 tablets by mouth every 4 (four) hours as needed for Pain.  (Patient not taking: Reported on 5/18/2021 )  SILDENAFIL CITRATE 100 MG Oral Tab, TAKE 1 TABLET BY MOUTH EVERY DAY AS NEEDED FOR ERECTILE DYSFUNCTION  Pantoprazole Sodium (PROTONIX) 40 01/13/2019     (H) 05/17/2021    TROP <0.045 10/03/2020     10/03/2020    ETOH <3 10/03/2020     MRI ABDOMEN&MRCP W/3D (W+W0)(CPT=74183/99865)    Result Date: 5/19/2021  CONCLUSION:   Acute pancreatitis.   No evidence of pancreatic ductal dilat

## 2021-05-20 NOTE — PAYOR COMM NOTE
--------------  5/20 CONTINUED STAY REVIEW    [facility]  Subscriber #:  [id number]  Authorization Number: [id number]     MRI/MRCP 5/19:  Acute pancreatitis.  No evidence of pancreatic ductal dilatation.       No evidence of biliary niall melatonin tab 9 mg     Date Action Dose Route User    5/19/2021 2244 Given 9 mg Oral Deya Deal RN      Pantoprazole Sodium (PROTONIX) 40 mg in Sodium Chloride (PF) 0.9 % 10 mL IV push     Date Action Dose Route User    5/20/2021 1042 Given 36

## 2021-05-20 NOTE — TELEPHONE ENCOUNTER
Seen inpatient  Recurrent acute pancreatitis  Etiology unclear ?etoh   Recommend follow-up in 2-4 wks with Dr Twila Hooper or PA in clinic to discuss role of EUS.

## 2021-05-20 NOTE — TELEPHONE ENCOUNTER
Patient discharged today, 5/20 and needs a note for employer for clearance to return on 5/24/21. Patient has been out since 5/17 thru 5/24. Any questions, [phone]    Note can be sent to Mary Hernandez.

## 2021-05-20 NOTE — DISCHARGE SUMMARY
DISCHARGE SUMMARY     [facility]:  [facility]    9/15/1970 MRN [id number]   Location [facility] 1W Attending Oscar Peñaloza MD   Hosp Day # 2 PCP Amelie Frias MD     Date of Admission: 2021  Date of Discharge: 21  Dis hospitalization:         Incidental or significant findings and recommendations (brief descriptions):        Lab/Test results pending at Discharge:         Consultants:        Discharge Medication List:     Discharge Medications      ASK your doctor about Tab  TAKE 1 TABLET BY MOUTH EVERY DAY AS NEEDED FOR ERECTILE DYSFUNCTION    Pantoprazole Sodium (PROTONIX) 40 MG Oral Tab EC  Take 1 tablet (40 mg total) by mouth every morning before breakfast.    !! Acetaminophen-Codeine (TYLENOL WITH CODEINE #3) 300-30

## 2021-05-21 ENCOUNTER — PATIENT OUTREACH (OUTPATIENT)
Dept: CASE MANAGEMENT | Age: 51
End: 2021-05-21

## 2021-05-21 ENCOUNTER — TELEPHONE (OUTPATIENT)
Dept: INTERNAL MEDICINE CLINIC | Facility: CLINIC | Age: 51
End: 2021-05-21

## 2021-05-21 DIAGNOSIS — Z02.9 ENCOUNTERS FOR UNSPECIFIED ADMINISTRATIVE PURPOSE: ICD-10-CM

## 2021-05-21 DIAGNOSIS — E11.9 TYPE 2 DIABETES MELLITUS WITHOUT COMPLICATION, WITHOUT LONG-TERM CURRENT USE OF INSULIN (HCC): Primary | ICD-10-CM

## 2021-05-21 RX ORDER — OMEPRAZOLE 40 MG/1
40 CAPSULE, DELAYED RELEASE ORAL 2 TIMES DAILY
COMMUNITY
End: 2021-05-21 | Stop reason: CLARIF

## 2021-05-21 RX ORDER — OMEPRAZOLE 20 MG/1
40 CAPSULE, DELAYED RELEASE ORAL
COMMUNITY
End: 2021-08-17

## 2021-05-21 NOTE — TELEPHONE ENCOUNTER
Left message to call back. Phone Room Staff:     Please assist with scheduling patient for hospital follow up in 4 weeks with DO Resendiz. Thank you!

## 2021-05-21 NOTE — PROGRESS NOTES
1st attempt GI apt request    [id number] [facility]  [address] [address]  [phone]  Apt made with Martha Watt for Mon.  July 12th @10:30am

## 2021-05-21 NOTE — PROGRESS NOTES
The patient is requesting assistance with scheduling a 2-4 week HFU appointment with the following specialist:     Follow up with  Curt Villela M.D.   Gastroenterology   as instructed  [address]  [phone]    Reba suarez

## 2021-05-21 NOTE — PROGRESS NOTES
GUICHO spoke with Dr. Aby Resendez. It was confirmed the patient may take omeprazole OTC 20mg 2 tablets by mouth bid. The patient was updated with this confirmation.

## 2021-05-21 NOTE — TELEPHONE ENCOUNTER
NCM spoke to the patient today to complete TCM. The patient was recently hospitalized for acute pancreatitis. The patient does not have a glucometer, test strips, or lancets at home for glucose monitoring.  The patient tests glucose levels once daily and is

## 2021-05-21 NOTE — PROGRESS NOTES
Initial Post Discharge Follow Up   Discharge Date: 5/20/21  Contact Date: 5/21/2021    Consent Verification:  Assessment Completed With: Patient  HIPAA Verified?   Yes    Discharge Dx:     Acute pancreatitis, unspecified complication status, unspecified following as directed:  STOP taking:  Acetaminophen-Codeine #3 300-30 MG  Tabs (TYLENOL #3)  Acetaminophen-Codeine 300-30 MG Tabs (Tylenol with  Codeine #3)    Current Outpatient Medications   Medication Sig Dispense Refill   • METFORMIN HCL 1000 MG Oral T Etc): Yes;  A TE was sent to the PCP's office for a DM supply request. The NCM also contacted GI to confirm PPI instructions at discharge.      Follow up appointments:      Your appointments     Date & Time Appointment Department [facility])    May 27, 2021  3 education was completed for signs/symptoms to monitor, and reviewed when to contact providers vs go to the ED/call 911. Appointments were reviewed and stressed the importance of a close follow up with providers. A TCM-HFU appointment was scheduled.  ANETTE hernandze

## 2021-05-21 NOTE — PAYOR COMM NOTE
--------------  DISCHARGE REVIEW    Payor: [employer]  Subscriber #:  [id number]  Authorization Number: [id number]     Admit date: 5/18/21  Admit time:   1:51 AM  Discharge Date: 5/20/2021  2:44 PM     Admitting Physician: Tang Rodriguez MD to meetings          Biliary sludge ultrasound and MRCP results noted ,  follow GI recommendation        History of diabetes mellitus we will cover with insulin will follow blood sugars    Brief Synopsis:     Discharge Physical Exam: General appearance: al TABLET BY MOUTH EVERY DAY AS NEEDED FOR ERECTILE DYSFUNCTION   Quantity: 6 tablet  Refills: 5            Discharge Plan:  Discharge Condition: Good    Current Discharge Medication List    Home Meds - Unchanged    METFORMIN HCL 1000 MG Oral Tab  TAKE 1 TABL

## 2021-05-24 NOTE — TELEPHONE ENCOUNTER
Appt made by Nasreen Mcgovern ([address] [place] [address] bedside scheduling dept) for 7/12/21 at 10:30AM with Jens Wolf       Thank You

## 2021-05-24 NOTE — PAYOR COMM NOTE
--------------  CONTINUED STAY REVIEW    Payor: [employer]  Subscriber #:  [id number]  Authorization Number: [id number]     Admit date: 5/18/21  Admit time:  1:51 AM    Admitting Physician: Teja Gupta MD  Attending Physician:  Kimberly att.  pro any alcohol since October of last year.     Seen in room  feeling little better he wants to try clear liquid diet        History           Past Medical History:   Diagnosis Date   •      • Diabetes ([address]     • Eczema     • Hyperlipidemia     • Pancreatitis Neurological: Negative. Hematological: Negative. Psychiatric/Behavioral: Negative.          Physical Exam:   Vital Signs:  Blood pressure 134/89, pulse 67, temperature 97.8 °F (36.6 °C), temperature source Oral, resp.  rate 18, weight 218 lb 4.8 oz PM                  Assessment/Plan:         Acute pancreatitis lipase elevated CT scan ordered. IV fluids pain medication.   Will start on clear liquid diet will follow GI is on board  We will get an ultrasound       Gastroesophageal reflux disease contin risk CRC screening.      Neymar Royal MD  [facility] Gastroenterology     PLEASE FAX DAYS CERTIFIED AND NEXT REVIEW DATE

## 2021-06-03 ENCOUNTER — TELEPHONE (OUTPATIENT)
Dept: INTERNAL MEDICINE CLINIC | Facility: CLINIC | Age: 51
End: 2021-06-03

## 2021-06-14 NOTE — TELEPHONE ENCOUNTER
2nd attempt - Called patient, no answer. Left VM to call us back to schedule an annual physical. Also due for a DM eye exam. If pt has had one done, to please have that MD's office send us a copy of notes.  If not, we can give pt a referral when pt comes in for a physical. Also sent msg via [facility]

## 2021-06-21 NOTE — TELEPHONE ENCOUNTER
3rd attempt - Called patient, no answer. Left VM to call us back to schedule an annual physical. Also due for a DM eye exam. If pt has had one done, to please have that MD's office send us a copy of notes. If not, we can give pt a referral when pt comes in for a physical. Also sent letter via mail.

## 2021-07-28 RX ORDER — GLIPIZIDE 5 MG/1
TABLET ORAL
Qty: 90 TABLET | Refills: 1 | OUTPATIENT
Start: 2021-07-28

## 2021-07-28 NOTE — TELEPHONE ENCOUNTER
Please call patient and see whether he is taking glipizide and Metformin. I have reviewed the chart briefly. It appears that he is only taking Metformin. Please clarify, please let me know I will send the prescription.   Thank you

## 2021-07-29 RX ORDER — GLIPIZIDE 5 MG/1
5 TABLET ORAL
Qty: 60 TABLET | Refills: 0 | Status: SHIPPED | OUTPATIENT
Start: 2021-07-29 | End: 2021-11-02

## 2021-07-29 NOTE — TELEPHONE ENCOUNTER
The patient stated he takes both at this time due to he was having high blood sugars due to an inflamed pancrease. Pended.

## 2021-08-16 ENCOUNTER — OFFICE VISIT (OUTPATIENT)
Dept: INTERNAL MEDICINE CLINIC | Facility: CLINIC | Age: 51
End: 2021-08-16
Payer: COMMERCIAL

## 2021-08-16 ENCOUNTER — HOSPITAL ENCOUNTER (OUTPATIENT)
Dept: GENERAL RADIOLOGY | Age: 51
Discharge: HOME | End: 2021-08-16
Attending: INTERNAL MEDICINE
Payer: COMMERCIAL

## 2021-08-16 VITALS
OXYGEN SATURATION: 99 % | TEMPERATURE: 98 F | SYSTOLIC BLOOD PRESSURE: 126 MMHG | HEIGHT: 71 IN | RESPIRATION RATE: 16 BRPM | WEIGHT: 203 LBS | HEART RATE: 71 BPM | BODY MASS INDEX: 28.42 KG/M2 | DIASTOLIC BLOOD PRESSURE: 74 MMHG

## 2021-08-16 DIAGNOSIS — Z87.19 HISTORY OF PANCREATITIS: ICD-10-CM

## 2021-08-16 DIAGNOSIS — R10.13 EPIGASTRIC PAIN: Primary | ICD-10-CM

## 2021-08-16 DIAGNOSIS — M25.511 CHRONIC RIGHT SHOULDER PAIN: ICD-10-CM

## 2021-08-16 DIAGNOSIS — G89.29 CHRONIC RIGHT SHOULDER PAIN: ICD-10-CM

## 2021-08-16 PROCEDURE — 73030 X-RAY EXAM OF SHOULDER: CPT | Performed by: INTERNAL MEDICINE

## 2021-08-16 PROCEDURE — 3008F BODY MASS INDEX DOCD: CPT | Performed by: INTERNAL MEDICINE

## 2021-08-16 PROCEDURE — 99214 OFFICE O/P EST MOD 30 MIN: CPT | Performed by: INTERNAL MEDICINE

## 2021-08-16 PROCEDURE — 3074F SYST BP LT 130 MM HG: CPT | Performed by: INTERNAL MEDICINE

## 2021-08-16 PROCEDURE — 3078F DIAST BP <80 MM HG: CPT | Performed by: INTERNAL MEDICINE

## 2021-08-17 ENCOUNTER — PATIENT MESSAGE (OUTPATIENT)
Dept: INTERNAL MEDICINE CLINIC | Facility: CLINIC | Age: 51
End: 2021-08-17

## 2021-08-17 RX ORDER — OMEPRAZOLE 20 MG/1
40 CAPSULE, DELAYED RELEASE ORAL
Qty: 180 CAPSULE | Refills: 1 | Status: SHIPPED | OUTPATIENT
Start: 2021-08-17 | End: 2021-08-18

## 2021-08-17 RX ORDER — SILDENAFIL 100 MG/1
100 TABLET, FILM COATED ORAL DAILY PRN
Qty: 18 TABLET | Refills: 1 | Status: SHIPPED | OUTPATIENT
Start: 2021-08-17 | End: 2022-01-25

## 2021-08-17 NOTE — PROGRESS NOTES
HPI/Subjective:   Patient ID: Nolberto Barnett is a 52year old male.     HPI  Patient comes in today with complaining of midepigastric abdominal pain pain conditions to the back is little better today has been hurting for last few days has been watching his Cardiovascular:      Rate and Rhythm: Normal rate and regular rhythm. Heart sounds: Normal heart sounds. Pulmonary:      Effort: Pulmonary effort is normal.      Breath sounds: Normal breath sounds.    Abdominal:      General: Bowel sounds are norm

## 2021-08-17 NOTE — TELEPHONE ENCOUNTER
Refilled per [facility] protocol. Requested Prescriptions   Pending Prescriptions Disp Refills    Sildenafil Citrate 100 MG Oral Tab 6 tablet 5     Sig: Take 1 tablet (100 mg total) by mouth daily as needed for Erectile Dysfunction.         Xander Duff

## 2021-08-18 ENCOUNTER — OFFICE VISIT (OUTPATIENT)
Dept: INTERNAL MEDICINE CLINIC | Facility: CLINIC | Age: 51
End: 2021-08-18
Payer: COMMERCIAL

## 2021-08-18 VITALS
HEART RATE: 72 BPM | WEIGHT: 203 LBS | TEMPERATURE: 98 F | DIASTOLIC BLOOD PRESSURE: 76 MMHG | RESPIRATION RATE: 17 BRPM | HEIGHT: 71 IN | OXYGEN SATURATION: 99 % | SYSTOLIC BLOOD PRESSURE: 124 MMHG | BODY MASS INDEX: 28.42 KG/M2

## 2021-08-18 DIAGNOSIS — R10.13 EPIGASTRIC PAIN: ICD-10-CM

## 2021-08-18 DIAGNOSIS — F10.11 ALCOHOL ABUSE, IN REMISSION: ICD-10-CM

## 2021-08-18 DIAGNOSIS — Z87.19 HISTORY OF PANCREATITIS: ICD-10-CM

## 2021-08-18 DIAGNOSIS — K21.9 GASTROESOPHAGEAL REFLUX DISEASE, UNSPECIFIED WHETHER ESOPHAGITIS PRESENT: ICD-10-CM

## 2021-08-18 DIAGNOSIS — Z86.59 HISTORY OF DEPRESSION: ICD-10-CM

## 2021-08-18 DIAGNOSIS — Z00.00 ANNUAL PHYSICAL EXAM: Primary | ICD-10-CM

## 2021-08-18 DIAGNOSIS — E11.9 TYPE 2 DIABETES MELLITUS WITHOUT COMPLICATION, WITHOUT LONG-TERM CURRENT USE OF INSULIN (HCC): ICD-10-CM

## 2021-08-18 DIAGNOSIS — E78.5 HYPERLIPIDEMIA, UNSPECIFIED HYPERLIPIDEMIA TYPE: ICD-10-CM

## 2021-08-18 DIAGNOSIS — L40.9 PSORIASIS: ICD-10-CM

## 2021-08-18 PROCEDURE — 99396 PREV VISIT EST AGE 40-64: CPT | Performed by: INTERNAL MEDICINE

## 2021-08-18 PROCEDURE — 3008F BODY MASS INDEX DOCD: CPT | Performed by: INTERNAL MEDICINE

## 2021-08-18 PROCEDURE — 3074F SYST BP LT 130 MM HG: CPT | Performed by: INTERNAL MEDICINE

## 2021-08-18 PROCEDURE — 3078F DIAST BP <80 MM HG: CPT | Performed by: INTERNAL MEDICINE

## 2021-08-18 RX ORDER — OMEPRAZOLE 40 MG/1
40 CAPSULE, DELAYED RELEASE ORAL DAILY
Qty: 90 CAPSULE | Refills: 1 | Status: SHIPPED | OUTPATIENT
Start: 2021-08-18 | End: 2022-01-25

## 2021-08-18 NOTE — TELEPHONE ENCOUNTER
From: Mireya Peres  To: Bing Braswell MD  Sent: 8/17/2021 3:05 AM CDT  Subject: Prescription Question    Please send a prescription to [address] at [facility] location, [phone] on my behalf for;  90 Capsule Delayed Release, 40mg Omeprazole  I am ou

## 2021-08-18 NOTE — TELEPHONE ENCOUNTER
From: Huma Collazo  To: Saleem Alexis MD  Sent: 8/17/2021 3:28 PM CDT  Subject: Prescription Question    Second request for a prescription for Omeprazole 40mg from [address] [place] [address] location phone number [phone]  Please let me know you receive

## 2021-08-18 NOTE — TELEPHONE ENCOUNTER
Routed to Dr Anum Cox for advise, thanks.       Requested Prescriptions     Pending Prescriptions Disp Refills   • omeprazole 20 MG Oral Capsule Delayed Release 180 capsule 1     Sig: Take 2 capsules (40 mg total) by mouth 2 (two) times daily before meal

## 2021-08-19 NOTE — PROGRESS NOTES
HPI/Subjective:     Patient ID: Ana Rosa Villegas is a 52year old male. HPI  Comes in for annual physical exam.  Recently seen for abdominal pain he says he is feeling better he is eating late for the pain improved but still feels it sometimes.   He had re Use: Never used    Alcohol use: Not Currently      Alcohol/week: 4.0 standard drinks      Types: 4 Cans of beer per week      Comment: Reports last use of alcohol was 10/30/19    Drug use: Not Currently      Comment: pt. smokes cocaine once a week.  Last us continue  History of depression  Gastroesophageal reflux disease, unspecified whether esophagitis present take ppi watch diet     No orders of the defined types were placed in this encounter.       Meds This Visit:  Requested Prescriptions     Signed Prescr

## 2021-09-13 ENCOUNTER — OFFICE VISIT (OUTPATIENT)
Dept: PHYSICAL MEDICINE AND REHAB | Facility: CLINIC | Age: 51
End: 2021-09-13
Payer: COMMERCIAL

## 2021-09-13 VITALS
SYSTOLIC BLOOD PRESSURE: 122 MMHG | HEIGHT: 71 IN | BODY MASS INDEX: 28 KG/M2 | HEART RATE: 88 BPM | WEIGHT: 200 LBS | RESPIRATION RATE: 20 BRPM | DIASTOLIC BLOOD PRESSURE: 84 MMHG

## 2021-09-13 DIAGNOSIS — M75.41 IMPINGEMENT SYNDROME OF RIGHT SHOULDER: Primary | ICD-10-CM

## 2021-09-13 PROCEDURE — 3074F SYST BP LT 130 MM HG: CPT | Performed by: PHYSICAL MEDICINE & REHABILITATION

## 2021-09-13 PROCEDURE — 99204 OFFICE O/P NEW MOD 45 MIN: CPT | Performed by: PHYSICAL MEDICINE & REHABILITATION

## 2021-09-13 PROCEDURE — 3079F DIAST BP 80-89 MM HG: CPT | Performed by: PHYSICAL MEDICINE & REHABILITATION

## 2021-09-13 PROCEDURE — 3008F BODY MASS INDEX DOCD: CPT | Performed by: PHYSICAL MEDICINE & REHABILITATION

## 2021-09-13 RX ORDER — MELOXICAM 15 MG/1
TABLET ORAL
Qty: 30 TABLET | Refills: 0 | Status: SHIPPED | OUTPATIENT
Start: 2021-09-13 | End: 2021-11-05

## 2021-09-13 NOTE — H&P
History and Physical    C/C: right shoulder pain    HPI: This is a 82-year-old male with past medical history of diabetes, psoriasis, pancreatitis who presents with right shoulder pain.   Symptoms started about 3 to 4 months ago without any clear inciting e denies   Psychiatric  Anxiety: denies  Depressed Mood: denies     Physical exam:  BMI 27.89. Blood pressure 122/84. Pulse 88. Respirations 20. PE right shoulder exam:    Range of motion:   Forward flexion: 160 degrees, end range pain  Abduction: 100 d

## 2021-10-12 DIAGNOSIS — M75.41 IMPINGEMENT SYNDROME OF RIGHT SHOULDER: ICD-10-CM

## 2021-10-12 NOTE — TELEPHONE ENCOUNTER
Medication request: Meloxicam 15mg daily prn    LOV 9/13/21-f/u in 6 weeks  No follow up    Last refill: 9/13/21 #30    LMTCB-to ensure patient is taking Meloxicam w/ directions and also to schedule follow up appt

## 2021-10-16 NOTE — TELEPHONE ENCOUNTER
LMTCB 3 attempt. Patient need to give update how often he is taking the medication and need to schedule a follow up appointment.

## 2021-10-19 RX ORDER — MELOXICAM 15 MG/1
15 TABLET ORAL DAILY PRN
Qty: 30 TABLET | Refills: 0 | OUTPATIENT
Start: 2021-10-19

## 2021-10-25 ENCOUNTER — TELEMEDICINE (OUTPATIENT)
Dept: INTERNAL MEDICINE CLINIC | Facility: CLINIC | Age: 51
End: 2021-10-25

## 2021-10-25 DIAGNOSIS — R53.83 OTHER FATIGUE: ICD-10-CM

## 2021-10-25 DIAGNOSIS — Z20.822 EXPOSURE TO COVID-19 VIRUS: Primary | ICD-10-CM

## 2021-10-25 DIAGNOSIS — R50.9 FEVER, UNSPECIFIED FEVER CAUSE: ICD-10-CM

## 2021-10-25 PROCEDURE — 99214 OFFICE O/P EST MOD 30 MIN: CPT | Performed by: INTERNAL MEDICINE

## 2021-10-25 NOTE — PROGRESS NOTES
Subjective:   Patient ID: Mariana Olmos is a 54year old male. HPI  Patient seen today through live 2 way video visit with complaint of being exposed to Covid last week on Thursday this is at the facility where his mother lives.   Family history develop place, and time.          Assessment & Plan:   Exposure to COVID-19 virus  (primary encounter diagnosis) we will await the results of Covid monitor symptoms  Other fatigue as above monitor strip reviewed with  Fever, unspecified fever cause as need medicati

## 2021-10-29 ENCOUNTER — TELEPHONE (OUTPATIENT)
Dept: INTERNAL MEDICINE CLINIC | Facility: CLINIC | Age: 51
End: 2021-10-29

## 2021-10-29 NOTE — TELEPHONE ENCOUNTER
----- Message from Court Campos sent at 10/28/2021  4:11 PM CDT -----  Regarding: Covid 19 test  Received my results, negative, but I'd like to get retested, still having symptoms and not sure about validity of previous test. Please call me if you have a

## 2021-10-29 NOTE — TELEPHONE ENCOUNTER
Spoke to patient. He decided to go to [facility] today to get Covid tested. He is still experiencing chills, diarrhea, fatigue, body aches and headache. He will call us if Covid results are positive so we can update chart.      Relayed to patient if he h

## 2021-11-01 ENCOUNTER — TELEMEDICINE (OUTPATIENT)
Dept: INTERNAL MEDICINE CLINIC | Facility: CLINIC | Age: 51
End: 2021-11-01
Payer: COMMERCIAL

## 2021-11-01 DIAGNOSIS — R53.83 FATIGUE, UNSPECIFIED TYPE: Primary | ICD-10-CM

## 2021-11-01 DIAGNOSIS — G93.3 POST VIRAL SYNDROME: ICD-10-CM

## 2021-11-01 PROCEDURE — 99213 OFFICE O/P EST LOW 20 MIN: CPT | Performed by: INTERNAL MEDICINE

## 2021-11-01 NOTE — PROGRESS NOTES
Subjective:   Patient ID: Chitra Helm is a 54year old male. HPI  patient comes in today for routine review visit last week due to his symptoms of fatigue some cough he has been tested for Covid so far negative both rapid and PCR.   His symptoms have cleared to go back to work, [employer] give a note. 18 min  No orders of the defined types were placed in this encounter.       Meds This Visit:  Requested Prescriptions      No prescriptions requested or ordered in this encounter       Imaging & Referrals:  No

## 2021-11-02 ENCOUNTER — APPOINTMENT (OUTPATIENT)
Dept: GENERAL RADIOLOGY | Age: 51
End: 2021-11-02
Attending: EMERGENCY MEDICINE
Payer: COMMERCIAL

## 2021-11-02 ENCOUNTER — TELEPHONE (OUTPATIENT)
Dept: INTERNAL MEDICINE CLINIC | Facility: CLINIC | Age: 51
End: 2021-11-02

## 2021-11-02 ENCOUNTER — HOSPITAL ENCOUNTER (OUTPATIENT)
Age: 51
Discharge: HOME | End: 2021-11-02
Attending: EMERGENCY MEDICINE
Payer: COMMERCIAL

## 2021-11-02 VITALS
RESPIRATION RATE: 20 BRPM | TEMPERATURE: 99 F | OXYGEN SATURATION: 98 % | DIASTOLIC BLOOD PRESSURE: 99 MMHG | HEART RATE: 91 BPM | SYSTOLIC BLOOD PRESSURE: 135 MMHG

## 2021-11-02 DIAGNOSIS — J02.0 STREP PHARYNGITIS: Primary | ICD-10-CM

## 2021-11-02 PROCEDURE — 71046 X-RAY EXAM CHEST 2 VIEWS: CPT | Performed by: EMERGENCY MEDICINE

## 2021-11-02 PROCEDURE — 99213 OFFICE O/P EST LOW 20 MIN: CPT

## 2021-11-02 PROCEDURE — 87880 STREP A ASSAY W/OPTIC: CPT

## 2021-11-02 RX ORDER — PENICILLIN V POTASSIUM 500 MG/1
500 TABLET ORAL 3 TIMES DAILY
Qty: 30 TABLET | Refills: 0 | Status: SHIPPED | OUTPATIENT
Start: 2021-11-02 | End: 2021-11-12

## 2021-11-02 RX ORDER — GLIPIZIDE 5 MG/1
TABLET ORAL
Qty: 90 TABLET | Refills: 1 | Status: SHIPPED | OUTPATIENT
Start: 2021-11-02 | End: 2021-12-21

## 2021-11-02 NOTE — ED PROVIDER NOTES
Patient Seen in: [facility]      History   Patient presents with:  Cough/URI    Stated Complaint: shortness of breath    Subjective:   HPI    Patient is a 51-year-old male who presents with 2 to 3 days of sore throat fever body aches.   He has well-developed. He is not ill-appearing. HENT:      Head: Normocephalic and atraumatic. Right Ear: Tympanic membrane normal.      Left Ear: Tympanic membrane normal.      Nose: Congestion present.       Mouth/Throat:      Mouth: Mucous membranes are Readings from Last 1 Encounters:  11/02/21 : 91  , sinus, normal    Radiology findings: XR CHEST PA + LAT CHEST (CPT=71046)    Result Date: 11/2/2021  CONCLUSION:  1. Normal chest radiographs.     Dictated by (CST): Chelsea Judd MD on 11/02/2021 at 4

## 2021-11-02 NOTE — TELEPHONE ENCOUNTER
Patient Not feeling well. Patient had negative covid test 10/31/21 at [facility]. He doesn't feel good to work tomorrow. Had televisits 10/25/21 and again 11/1/21  Today has started with diarrhea, and sore throat came back.  bodyaches, cough; has chill

## 2021-11-02 NOTE — ED INITIAL ASSESSMENT (HPI)
PATIENT ARRIVED AMBULATORY TO ROOM. PATIENT STATES HE WAS EXPOSED TO COVID ON 10/21 AND HAS BEEN HOME QUARANTINED SINCE. PATIENT HAS HAD NEGATIVE COVID TESTS. MOST RECENT WAS 2 DAYS AGO. PATIENT C/O INTERMITTENT COUGH. INTERMITTENT SOB.  DIARRHEA AND CHILLS

## 2021-11-04 ENCOUNTER — PATIENT MESSAGE (OUTPATIENT)
Dept: INTERNAL MEDICINE CLINIC | Facility: CLINIC | Age: 51
End: 2021-11-04

## 2021-11-04 ENCOUNTER — TELEMEDICINE (OUTPATIENT)
Dept: INTERNAL MEDICINE CLINIC | Facility: CLINIC | Age: 51
End: 2021-11-04

## 2021-11-04 DIAGNOSIS — M75.41 IMPINGEMENT SYNDROME OF RIGHT SHOULDER: ICD-10-CM

## 2021-11-04 DIAGNOSIS — R50.9 FEVER, UNSPECIFIED FEVER CAUSE: ICD-10-CM

## 2021-11-04 DIAGNOSIS — R53.83 FATIGUE, UNSPECIFIED TYPE: ICD-10-CM

## 2021-11-04 DIAGNOSIS — J02.0 STREP THROAT: Primary | ICD-10-CM

## 2021-11-04 PROCEDURE — 99214 OFFICE O/P EST MOD 30 MIN: CPT | Performed by: INTERNAL MEDICINE

## 2021-11-04 RX ORDER — ONDANSETRON 4 MG/1
4 TABLET, FILM COATED ORAL EVERY 8 HOURS PRN
Qty: 20 TABLET | Refills: 0 | Status: SHIPPED | OUTPATIENT
Start: 2021-11-04 | End: 2021-12-20

## 2021-11-04 RX ORDER — AMOXICILLIN AND CLAVULANATE POTASSIUM 875; 125 MG/1; MG/1
1 TABLET, FILM COATED ORAL 2 TIMES DAILY
Qty: 20 TABLET | Refills: 0 | Status: SHIPPED | OUTPATIENT
Start: 2021-11-04 | End: 2021-11-14

## 2021-11-04 NOTE — PROGRESS NOTES
Subjective:   Patient ID: Naveed Arthur is a 54year old male. HPI    Patient seen today through live 2 way.  Current care has since wasn't feeling better today was diagnosed with strep throat was given ampicillin as per patient has been taking but cont Exam  Constitutional:       Appearance: He is not ill-appearing. Pulmonary:      Effort: No respiratory distress. Neurological:      Mental Status: He is oriented to person, place, and time.          Assessment & Plan:   Strep throat  (primary encounter

## 2021-11-05 RX ORDER — MELOXICAM 15 MG/1
15 TABLET ORAL DAILY PRN
Qty: 30 TABLET | Refills: 0 | Status: SHIPPED | OUTPATIENT
Start: 2021-11-05 | End: 2022-01-25

## 2021-11-05 NOTE — TELEPHONE ENCOUNTER
I will refill one more time. Please ask him if he started PT; he should proceed with PT if he has not already done so, and follow up with me if he continues to need the medication.

## 2021-11-05 NOTE — TELEPHONE ENCOUNTER
Medication request: Meloxicam 15 MG Oral Tab  Si tab PO qDaily x14 days, then 1 tab PO qDaily PRN pain. Take with food. Qty#30R-0    LOV:  21  NOV: None    [place]      .   S/w patient for condition update on Meloxicam, state

## 2021-11-05 NOTE — TELEPHONE ENCOUNTER
From: Cande Kincaid  To: Lonnie Benito MD  Sent: 11/4/2021 3:53 PM CDT  Subject: Doctor note    Please still waiting on doctor's note for work, you said I should be able to return to work on Saturday night the 6th of November?  Need to to confirm with work

## 2021-12-20 NOTE — PROGRESS NOTES
Subjective:     Patient ID: Aki Pak is a 54year old male. HPI  Patient comes to the follow-up. He says for the last 3 months or so he is gone back to drinking and using drugs usually smokes crack cocaine  Last time he used was last Monday.   He Eczema    • Hyperlipidemia    • Pancreatitis    • Psoriasis       Past Surgical History:   Procedure Laterality Date   • HERNIA SURGERY        Family History   Problem Relation Age of Onset   • Diabetes Mother    • Cancer Sister       Social History: Alan Craig give referral to Niurka Ortez  Drug abuse and dependence [facility]) as above continue with AA meetings follow-up with general health  Type 2 diabetes mellitus without complication, without long-term current use of insulin ([address] continue current treatment  Chacha Vargas

## 2021-12-21 ENCOUNTER — IMMUNIZATION (OUTPATIENT)
Dept: LAB | Facility: HOSPITAL | Age: 51
End: 2021-12-21
Attending: EMERGENCY MEDICINE
Payer: COMMERCIAL

## 2021-12-21 DIAGNOSIS — Z23 NEED FOR VACCINATION: Primary | ICD-10-CM

## 2021-12-21 PROCEDURE — 0064A SARSCOV2 VAC 50MCG/0.25ML IM: CPT | Performed by: NURSE PRACTITIONER

## 2021-12-21 RX ORDER — GLIPIZIDE 5 MG/1
TABLET ORAL
Qty: 30 TABLET | Refills: 5 | Status: SHIPPED | OUTPATIENT
Start: 2021-12-21 | End: 2022-01-25

## 2021-12-27 ENCOUNTER — TELEMEDICINE (OUTPATIENT)
Dept: INTERNAL MEDICINE CLINIC | Facility: CLINIC | Age: 51
End: 2021-12-27

## 2021-12-27 DIAGNOSIS — R10.84 GENERALIZED ABDOMINAL PAIN: ICD-10-CM

## 2021-12-27 DIAGNOSIS — E11.9 TYPE 2 DIABETES MELLITUS WITHOUT COMPLICATION, WITHOUT LONG-TERM CURRENT USE OF INSULIN (HCC): ICD-10-CM

## 2021-12-27 DIAGNOSIS — N47.8 FORESKIN PROBLEM: Primary | ICD-10-CM

## 2021-12-27 DIAGNOSIS — Z87.19 HISTORY OF PANCREATITIS: ICD-10-CM

## 2021-12-27 PROCEDURE — 99214 OFFICE O/P EST MOD 30 MIN: CPT | Performed by: INTERNAL MEDICINE

## 2021-12-27 NOTE — PROGRESS NOTES
Subjective:   Patient ID: Roger Gold is a 54year old male. HPI  Patient seen today through life to review visit is just concerned but lately not feeling very well he knows he needs to get blood work done.   He is continuing  to abstain from drugs an Allergies:  Dander                  ITCHING    Comment: Cat dander: Watery eyes, sneezing    Objective:   Physical Exam  Constitutional:       Appearance: He is not ill-appearing. Pulmonary:      Effort: No respiratory distress.    Neurological:

## 2022-01-03 PROCEDURE — 3046F HEMOGLOBIN A1C LEVEL >9.0%: CPT | Performed by: INTERNAL MEDICINE

## 2022-01-03 PROCEDURE — 3061F NEG MICROALBUMINURIA REV: CPT | Performed by: INTERNAL MEDICINE

## 2022-01-04 LAB
ABSOLUTE BASOPHILS: 87 CELLS/UL (ref 0–200)
ABSOLUTE EOSINOPHILS: 181 CELLS/UL (ref 15–500)
ABSOLUTE LYMPHOCYTES: 2472 CELLS/UL (ref 850–3900)
ABSOLUTE MONOCYTES: 549 CELLS/UL (ref 200–950)
ABSOLUTE NEUTROPHILS: 3410 CELLS/UL (ref 1500–7800)
ALBUMIN/GLOBULIN RATIO: 1.4 (CALC) (ref 1–2.5)
ALBUMIN: 4.4 G/DL (ref 3.6–5.1)
ALKALINE PHOSPHATASE: 74 U/L (ref 36–130)
ALT: 34 U/L (ref 9–46)
APPEARANCE: CLEAR
AST: 33 U/L (ref 10–40)
BASOPHILS: 1.3 %
BILIRUBIN, TOTAL: 0.6 MG/DL (ref 0.2–1.2)
BILIRUBIN: NEGATIVE
BUN: 10 MG/DL (ref 7–25)
CALCIUM: 9.8 MG/DL (ref 8.6–10.3)
CARBON DIOXIDE: 27 MMOL/L (ref 20–32)
CHLORIDE: 101 MMOL/L (ref 98–110)
CHOL/HDLC RATIO: 3.6 (CALC)
CHOLESTEROL, TOTAL: 157 MG/DL
COLOR: YELLOW
CREATININE, RANDOM URINE: 35 MG/DL (ref 20–320)
CREATININE: 0.89 MG/DL (ref 0.6–1.35)
EGFR IF AFRICN AM: 116 ML/MIN/1.73M2
EGFR IF NONAFRICN AM: 100 ML/MIN/1.73M2
EOSINOPHILS: 2.7 %
GLOBULIN: 3.1 G/DL (CALC) (ref 1.9–3.7)
GLUCOSE: 174 MG/DL (ref 65–99)
GLUCOSE: NEGATIVE
HDL CHOLESTEROL: 44 MG/DL
HEMATOCRIT: 44.2 % (ref 38.5–50)
HEMOGLOBIN A1C: 9.7 % OF TOTAL HGB
HEMOGLOBIN: 15.2 G/DL (ref 13.2–17.1)
KETONES: NEGATIVE
LDL-CHOLESTEROL: 87 MG/DL (CALC)
LYMPHOCYTES: 36.9 %
MCH: 31.1 PG (ref 27–33)
MCHC: 34.4 G/DL (ref 32–36)
MCV: 90.4 FL (ref 80–100)
MICROALBUMIN/CREATININE RATIO, RANDOM URINE: 9 MCG/MG CREAT
MICROALBUMIN: 0.3 MG/DL
MONOCYTES: 8.2 %
MPV: 10 FL (ref 7.5–12.5)
NEUTROPHILS: 50.9 %
NITRITE: NEGATIVE
NON-HDL CHOLESTEROL: 113 MG/DL (CALC)
OCCULT BLOOD: NEGATIVE
PH: 8 (ref 5–8)
PLATELET COUNT: 287 THOUSAND/UL (ref 140–400)
POTASSIUM: 4.2 MMOL/L (ref 3.5–5.3)
PROTEIN, TOTAL: 7.5 G/DL (ref 6.1–8.1)
PROTEIN: NEGATIVE
RDW: 12.3 % (ref 11–15)
RED BLOOD CELL COUNT: 4.89 MILLION/UL (ref 4.2–5.8)
SODIUM: 136 MMOL/L (ref 135–146)
SPECIFIC GRAVITY: 1.01 (ref 1–1.03)
TRIGLYCERIDES: 161 MG/DL
TSH W/REFLEX TO FT4: 1.87 MIU/L (ref 0.4–4.5)
WHITE BLOOD CELL COUNT: 6.7 THOUSAND/UL (ref 3.8–10.8)

## 2022-01-25 DIAGNOSIS — M75.41 IMPINGEMENT SYNDROME OF RIGHT SHOULDER: ICD-10-CM

## 2022-01-25 RX ORDER — GLIPIZIDE 5 MG/1
5 TABLET ORAL
Qty: 30 TABLET | Refills: 5 | Status: SHIPPED | OUTPATIENT
Start: 2022-01-25

## 2022-01-25 RX ORDER — OMEPRAZOLE 40 MG/1
40 CAPSULE, DELAYED RELEASE ORAL DAILY
Qty: 90 CAPSULE | Refills: 1 | Status: SHIPPED | OUTPATIENT
Start: 2022-01-25

## 2022-01-25 RX ORDER — MELOXICAM 15 MG/1
15 TABLET ORAL DAILY PRN
Qty: 30 TABLET | Refills: 0 | Status: SHIPPED | OUTPATIENT
Start: 2022-01-25

## 2022-01-25 RX ORDER — SILDENAFIL 100 MG/1
100 TABLET, FILM COATED ORAL DAILY PRN
Qty: 18 TABLET | Refills: 1 | Status: SHIPPED | OUTPATIENT
Start: 2022-01-25

## 2022-01-26 ENCOUNTER — TELEPHONE (OUTPATIENT)
Dept: INTERNAL MEDICINE CLINIC | Facility: CLINIC | Age: 52
End: 2022-01-26

## 2022-01-27 ENCOUNTER — PATIENT MESSAGE (OUTPATIENT)
Dept: INTERNAL MEDICINE CLINIC | Facility: CLINIC | Age: 52
End: 2022-01-27

## 2022-01-27 ENCOUNTER — OFFICE VISIT (OUTPATIENT)
Dept: SURGERY | Facility: CLINIC | Age: 52
End: 2022-01-27
Payer: COMMERCIAL

## 2022-01-27 DIAGNOSIS — N47.1 PHIMOSIS: Primary | ICD-10-CM

## 2022-01-27 PROCEDURE — 99214 OFFICE O/P EST MOD 30 MIN: CPT | Performed by: UROLOGY

## 2022-01-27 NOTE — TELEPHONE ENCOUNTER
Sukumar Tran RN 1/27/2022 11:02 AM CST        ----- Message -----  From: German Helms  Sent: 1/27/2022 10:15 AM CST  To: Em Rn Triage  Subject: Medications     Hello and thank you for the refill request, did they get approved for 3 month supply, is my

## 2022-01-27 NOTE — PROGRESS NOTES
Army Norberto is a 54year old male. HPI:   Patient presents with:  Circumcision: pt LOV 6/2019 pt was here to discuss surgery and still intrested       43-year-old male in follow-up to visit 6/3/2019 for phimosis.   Recommendation made at the time of h total) by mouth 2 (two) times daily with meals. 180 tablet 0   • Multiple Vitamin (MULTI-VITAMIN DAILY) Oral Tab Take 1 tablet by mouth daily. • Meloxicam 15 MG Oral Tab Take 1 tablet (15 mg total) by mouth daily as needed for Pain.  (Patient not taking

## 2022-01-27 NOTE — PROGRESS NOTES
Patient seen in office, scheduled circumcision, Thursday 02/24/2022, [facility], went over pre-op instructions, no labs needed, all questions answered.

## 2022-02-21 ENCOUNTER — LAB REQUISITION (OUTPATIENT)
Dept: SURGERY | Age: 52
End: 2022-02-21
Payer: COMMERCIAL

## 2022-02-22 LAB — SARS-COV-2 RNA RESP QL NAA+PROBE: NOT DETECTED

## 2022-03-17 ENCOUNTER — TELEPHONE (OUTPATIENT)
Dept: INTERNAL MEDICINE CLINIC | Facility: CLINIC | Age: 52
End: 2022-03-17

## 2022-03-17 ENCOUNTER — OFFICE VISIT (OUTPATIENT)
Dept: INTERNAL MEDICINE CLINIC | Facility: CLINIC | Age: 52
End: 2022-03-17
Payer: COMMERCIAL

## 2022-03-17 VITALS
HEIGHT: 71 IN | RESPIRATION RATE: 16 BRPM | DIASTOLIC BLOOD PRESSURE: 84 MMHG | TEMPERATURE: 98 F | HEART RATE: 67 BPM | SYSTOLIC BLOOD PRESSURE: 120 MMHG | OXYGEN SATURATION: 98 % | BODY MASS INDEX: 28 KG/M2

## 2022-03-17 DIAGNOSIS — F19.11 HISTORY OF DRUG ABUSE IN REMISSION (HCC): ICD-10-CM

## 2022-03-17 DIAGNOSIS — F10.11 HISTORY OF ALCOHOL ABUSE: ICD-10-CM

## 2022-03-17 DIAGNOSIS — Z91.89: ICD-10-CM

## 2022-03-17 DIAGNOSIS — E11.9 TYPE 2 DIABETES MELLITUS WITHOUT COMPLICATION, WITHOUT LONG-TERM CURRENT USE OF INSULIN (HCC): ICD-10-CM

## 2022-03-17 DIAGNOSIS — Z87.19 HISTORY OF PANCREATITIS: ICD-10-CM

## 2022-03-17 DIAGNOSIS — Z86.59 HISTORY OF DEPRESSION: ICD-10-CM

## 2022-03-17 DIAGNOSIS — Z00.00 ANNUAL PHYSICAL EXAM: Primary | ICD-10-CM

## 2022-03-17 PROCEDURE — 3074F SYST BP LT 130 MM HG: CPT | Performed by: INTERNAL MEDICINE

## 2022-03-17 PROCEDURE — 3008F BODY MASS INDEX DOCD: CPT | Performed by: INTERNAL MEDICINE

## 2022-03-17 PROCEDURE — 99396 PREV VISIT EST AGE 40-64: CPT | Performed by: INTERNAL MEDICINE

## 2022-03-17 PROCEDURE — 3079F DIAST BP 80-89 MM HG: CPT | Performed by: INTERNAL MEDICINE

## 2022-03-17 RX ORDER — MUPIROCIN CALCIUM 20 MG/G
1 CREAM TOPICAL DAILY
Qty: 15 G | Refills: 0 | Status: SHIPPED | OUTPATIENT
Start: 2022-03-17 | End: 2022-03-31

## 2022-03-17 RX ORDER — SULFAMETHOXAZOLE AND TRIMETHOPRIM 800; 160 MG/1; MG/1
1 TABLET ORAL 2 TIMES DAILY
Qty: 14 TABLET | Refills: 0 | Status: SHIPPED | OUTPATIENT
Start: 2022-03-17 | End: 2022-03-24

## 2022-03-17 NOTE — TELEPHONE ENCOUNTER
Pt is requesting alternative to Mupirocin 2% cream, pt's insurance will not cover and too expensive for pt to pay for.

## 2022-03-17 NOTE — TELEPHONE ENCOUNTER
Advised [facility] pharmacist Mae of Dr. Moctezuma Nephew note. Mae ran the ointment and it went through on the insurance. Patient advised.

## 2022-03-18 NOTE — TELEPHONE ENCOUNTER
Pt states he was able to get Mupirocin Calcium 2 % External Cream filled and \"it is taken care of\"

## 2022-03-27 RX ORDER — OMEPRAZOLE 40 MG/1
40 CAPSULE, DELAYED RELEASE ORAL DAILY
Qty: 90 CAPSULE | Refills: 1 | Status: CANCELLED | OUTPATIENT
Start: 2022-03-27

## 2022-04-22 NOTE — TELEPHONE ENCOUNTER
Please review; protocol failed/No Protcol    Requested Prescriptions   Pending Prescriptions Disp Refills    METFORMIN HCL 1000 MG Oral Tab [Pharmacy Med Name: METFORMIN HCL 1,000 MG TABLET] 180 tablet 0     Sig: TAKE 1 TABLET BY MOUTH TWICE A DAY WITH MEALS        Diabetes Medication Protocol Failed - 4/22/2022 12:31 PM        Failed - Last A1C < 7.5 and within past 6 months     Lab Results   Component Value Date    A1C 9.7 (H) 01/03/2022               Passed - Appointment in past 6 or next 3 months        Passed - GFR Non- > 50     Lab Results   Component Value Date    GFRNAA 100 01/03/2022                 Passed - GFR in the past 12 months              Recent Outpatient Visits              1 month ago Annual physical exam    [facility], Robin Ashley MD    Office Visit    2 months ago [address] [facility], [address], Hailee Acevedo MD    Office Visit    3 months ago Foreskin problem    [facility], [facility], [facility], MD Juju    Telemedicine    4 months ago Alcohol use disorder, severe, dependence [facility])    Lynda Jones MD    Office Visit    5 months ago Strep throat    [facility], [facility], [address], Robin Ashley MD    Telemedicine            Future Appointments         Provider Department Appt Notes    In 4 days Omar Ferro, [facility] 84 pancreatitis/ CLN informed of policies    In 1 month LMB RN RADIOLOGY 1; Jude CT scan

## 2022-04-26 ENCOUNTER — OFFICE VISIT (OUTPATIENT)
Dept: GASTROENTEROLOGY | Facility: CLINIC | Age: 52
End: 2022-04-26
Payer: COMMERCIAL

## 2022-04-26 ENCOUNTER — TELEPHONE (OUTPATIENT)
Dept: GASTROENTEROLOGY | Facility: CLINIC | Age: 52
End: 2022-04-26

## 2022-04-26 VITALS
WEIGHT: 208 LBS | SYSTOLIC BLOOD PRESSURE: 115 MMHG | DIASTOLIC BLOOD PRESSURE: 77 MMHG | TEMPERATURE: 99 F | BODY MASS INDEX: 29.12 KG/M2 | HEART RATE: 76 BPM | HEIGHT: 71 IN

## 2022-04-26 DIAGNOSIS — R10.13 DYSPEPSIA: ICD-10-CM

## 2022-04-26 DIAGNOSIS — K85.90 ACUTE PANCREATITIS WITHOUT INFECTION OR NECROSIS, UNSPECIFIED PANCREATITIS TYPE: ICD-10-CM

## 2022-04-26 DIAGNOSIS — Z12.11 COLON CANCER SCREENING: Primary | ICD-10-CM

## 2022-04-26 DIAGNOSIS — K21.9 GASTROESOPHAGEAL REFLUX DISEASE, UNSPECIFIED WHETHER ESOPHAGITIS PRESENT: ICD-10-CM

## 2022-04-26 PROCEDURE — 3008F BODY MASS INDEX DOCD: CPT | Performed by: NURSE PRACTITIONER

## 2022-04-26 PROCEDURE — 3074F SYST BP LT 130 MM HG: CPT | Performed by: NURSE PRACTITIONER

## 2022-04-26 PROCEDURE — 3078F DIAST BP <80 MM HG: CPT | Performed by: NURSE PRACTITIONER

## 2022-04-26 PROCEDURE — 99215 OFFICE O/P EST HI 40 MIN: CPT | Performed by: NURSE PRACTITIONER

## 2022-04-26 RX ORDER — POLYETHYLENE GLYCOL 3350, SODIUM CHLORIDE, SODIUM BICARBONATE, POTASSIUM CHLORIDE 420; 11.2; 5.72; 1.48 G/4L; G/4L; G/4L; G/4L
POWDER, FOR SOLUTION ORAL
Qty: 4000 ML | Refills: 0 | Status: SHIPPED | OUTPATIENT
Start: 2022-04-26

## 2022-04-26 NOTE — TELEPHONE ENCOUNTER
Scheduled for:  Colonoscopy [phone] EUS with poss FNA [id number]  Provider Name:  Dr Liat Nagy  Date:  7/25/2022  Location:  [facility]  Sedation:  MAC  Time:  7:30am (pt is aware to arrive at 6:30am)  Prep:  Trilyte  Meds/Allergies Reconciled?:  Radha/NP reviewed. Diagnosis with codes:  Colon screening Z12.11 GERD K21.9 Dyspepsia R10.13 Pancreatitis K85.90   Was patient informed to call insurance with codes (Y/N):  Yes  Referral sent?:  Referral was sent at the time of electronic surgical scheduling. [facility] or [address] notified?:  I sent an electronic request to Endo Scheduling and received a confirmation today. Medication Orders:   This patient verbally confirmed that he is not taking:  Iron, blood thinners, BP meds, and is diabetic  Not latex allergy, Not PCN allergy and does not have a pacemaker  Patient is aware to [place] and Metformin the day before and the day of the procedure   Pt is aware to NOT take iron pills, herbal meds and diet supplements for 7 days before exam. Also to NOT take any form of alcohol, recreational drugs and any forms of ED meds 24 hours before exam.         Misc Orders:  Patient is aware that the ENDO dept will call to schedule a COVID 19 test before the procedure      Further instructions given by staff:   I discussed the prep instructions with the patient at the time of the procedure which he verbally understood

## 2022-04-26 NOTE — TELEPHONE ENCOUNTER
Patient has Mikael Welsh    Patient is scheduled with Dr Liat Nagy for a Colonoscopy/EGD and EUS with possible fine needle aspiration which is a procedure only preformed at [facility]

## 2022-06-03 ENCOUNTER — HOSPITAL ENCOUNTER (OUTPATIENT)
Dept: CT IMAGING | Age: 52
Discharge: HOME | End: 2022-06-03
Attending: INTERNAL MEDICINE

## 2022-06-03 DIAGNOSIS — Z13.6 SCREENING FOR HEART DISEASE: ICD-10-CM

## 2022-06-06 NOTE — PROGRESS NOTES
Pt informed there was Incidental finding of 4 mm pulmonary nodule in the left lower lobe. Recommend follow-up CT chest to assess for additional nodules. Will place order also will refer to pulmo, no need for concern but we need to make sure. Pt given ph# for outpatient scheduling and also given Dr. Claudio Manuel.

## 2022-06-07 ENCOUNTER — ORDER TRANSCRIPTION (OUTPATIENT)
Dept: ADMINISTRATIVE | Facility: HOSPITAL | Age: 52
End: 2022-06-07

## 2022-06-07 DIAGNOSIS — Z13.9 ENCOUNTER FOR SCREENING: Primary | ICD-10-CM

## 2022-06-15 ENCOUNTER — TELEMEDICINE (OUTPATIENT)
Dept: INTERNAL MEDICINE CLINIC | Facility: CLINIC | Age: 52
End: 2022-06-15

## 2022-06-15 DIAGNOSIS — R53.83 OTHER FATIGUE: ICD-10-CM

## 2022-06-15 DIAGNOSIS — J02.9 SORE THROAT: Primary | ICD-10-CM

## 2022-06-15 DIAGNOSIS — M79.10 MYALGIA: ICD-10-CM

## 2022-06-15 PROCEDURE — 99214 OFFICE O/P EST MOD 30 MIN: CPT | Performed by: INTERNAL MEDICINE

## 2022-06-16 ENCOUNTER — PATIENT MESSAGE (OUTPATIENT)
Dept: INTERNAL MEDICINE CLINIC | Facility: CLINIC | Age: 52
End: 2022-06-16

## 2022-06-17 ENCOUNTER — LAB ENCOUNTER (OUTPATIENT)
Dept: LAB | Age: 52
End: 2022-06-17
Attending: INTERNAL MEDICINE
Payer: COMMERCIAL

## 2022-06-17 DIAGNOSIS — M79.10 MYALGIA: ICD-10-CM

## 2022-06-17 DIAGNOSIS — R53.83 OTHER FATIGUE: ICD-10-CM

## 2022-06-17 DIAGNOSIS — J02.9 SORE THROAT: ICD-10-CM

## 2022-06-17 NOTE — TELEPHONE ENCOUNTER
To: Johanny Obrien CMA      From: Greman Helms      Created: 6/17/2022 12:17 PM        *-*-*This message has not been handled. *-*-*    Since Sunday the 12th thru this Friday, today the 17th, really not feeling better, just went and got the COVID-19 test this morning, I'd really rather include this Sunday as well just in case, thank you.           Please advise

## 2022-06-18 LAB — SARS-COV-2 RNA RESP QL NAA+PROBE: NOT DETECTED

## 2022-06-21 ENCOUNTER — TELEPHONE (OUTPATIENT)
Dept: INTERNAL MEDICINE CLINIC | Facility: CLINIC | Age: 52
End: 2022-06-21

## 2022-06-21 NOTE — TELEPHONE ENCOUNTER
Spoke to pt, informed him of drs msg. Pt requested an appt to be seen in clinic for tomorrow. Scheduled for 1:15pm tomorrow.      Just FYI

## 2022-06-21 NOTE — TELEPHONE ENCOUNTER
Dr Darrin Louise: patient asked if you can approve another work excuse note. Patient seen 6/15/22 for cold symptoms; he also  c/o depression due to marital problems. Has spoken with therapist provided by employer. He is in process of looking to establish a therapist for next 8 sessions. Would like a few more days off and Return to work 6/24/22. Pended letter for your review. His original letter dated 6/20/22 has him returning 6/21/22.

## 2022-06-24 ENCOUNTER — TELEPHONE (OUTPATIENT)
Dept: INTERNAL MEDICINE CLINIC | Facility: CLINIC | Age: 52
End: 2022-06-24

## 2022-06-24 NOTE — TELEPHONE ENCOUNTER
The patient calling to ask the order be faxed to  [facility]. Fax no. [phone] which was done     The order is for CT Chest LD no CAD    Faxed as requested. I also informed the patient to check on how much it will cost for it was not authorized by his insurance company. Per the patient he will pay out of pocket.   I also informed him it can cost thousands of dollars but he stated he wants the test done now and he will cover the cost.

## 2022-06-27 ENCOUNTER — TELEPHONE (OUTPATIENT)
Dept: INTERNAL MEDICINE CLINIC | Facility: CLINIC | Age: 52
End: 2022-06-27

## 2022-06-27 RX ORDER — NICOTINE 21 MG/24HR
1 PATCH, TRANSDERMAL 24 HOURS TRANSDERMAL EVERY 24 HOURS
Qty: 30 PATCH | Refills: 2 | Status: SHIPPED | OUTPATIENT
Start: 2022-06-27

## 2022-06-27 NOTE — TELEPHONE ENCOUNTER
Patient states he needs a different antidepressive medication as the fluoxetine keeps him up all night and all day. Patient works the night shift. He states he still has anxiety, and he couldn't stay still and his arms felt \"prickly\" by his upper under arms. Patient also states he needs step 2 for the nicotine patch as he was only smoking about four cigarettes a day. Please advise.

## 2022-06-27 NOTE — TELEPHONE ENCOUNTER
I spoke with him, will forward to pcp, he wanted to talk , pls call him it is regarding new antidepressant

## 2022-06-27 NOTE — TELEPHONE ENCOUNTER
(Message Delivered)   LIBBY E L I [facility]eb[facility] Mail I E S :  2022 12:13p           TACO         Delivered  ======================================================================  Paging    Message # [phone]         2022 01:54p   [NHAN]  To:  From:  MUSA  Primary MD:  Phone#:  ----------------------------------------------------------------------  Barrie Guzman --[phone]  RE PT HAS  ANXIETY/  QUESTIONS RE NICOTINE PATCH   [birth year] Paged at number :  PAGE: [phone] at  :  13:54

## 2022-06-27 NOTE — TELEPHONE ENCOUNTER
Nash Ortiz will change the SSRI to zoloft 50 mg, will send to pharmacy  Will sed the lower dose nicotine patch   Make a follow up appointment in few weeks

## 2022-07-16 RX ORDER — SODIUM CHLORIDE, SODIUM LACTATE, POTASSIUM CHLORIDE, CALCIUM CHLORIDE 600; 310; 30; 20 MG/100ML; MG/100ML; MG/100ML; MG/100ML
INJECTION, SOLUTION INTRAVENOUS CONTINUOUS
Status: CANCELLED | OUTPATIENT
Start: 2022-07-16

## 2022-07-22 ENCOUNTER — LAB ENCOUNTER (OUTPATIENT)
Dept: LAB | Age: 52
End: 2022-07-22
Attending: INTERNAL MEDICINE
Payer: COMMERCIAL

## 2022-07-22 DIAGNOSIS — Z01.818 PRE-OP TESTING: ICD-10-CM

## 2022-07-23 LAB — SARS-COV-2 RNA RESP QL NAA+PROBE: NOT DETECTED

## 2022-07-25 ENCOUNTER — ANESTHESIA EVENT (OUTPATIENT)
Dept: ENDOSCOPY | Facility: HOSPITAL | Age: 52
End: 2022-07-25
Payer: COMMERCIAL

## 2022-07-25 ENCOUNTER — HOSPITAL ENCOUNTER (OUTPATIENT)
Facility: HOSPITAL | Age: 52
Setting detail: HOSPITAL OUTPATIENT SURGERY
Discharge: HOME | End: 2022-07-25
Attending: INTERNAL MEDICINE | Admitting: INTERNAL MEDICINE
Payer: COMMERCIAL

## 2022-07-25 ENCOUNTER — ANESTHESIA (OUTPATIENT)
Dept: ENDOSCOPY | Facility: HOSPITAL | Age: 52
End: 2022-07-25
Payer: COMMERCIAL

## 2022-07-25 VITALS
HEART RATE: 69 BPM | WEIGHT: 200 LBS | TEMPERATURE: 99 F | SYSTOLIC BLOOD PRESSURE: 95 MMHG | OXYGEN SATURATION: 98 % | BODY MASS INDEX: 28 KG/M2 | RESPIRATION RATE: 17 BRPM | DIASTOLIC BLOOD PRESSURE: 69 MMHG | HEIGHT: 71 IN

## 2022-07-25 DIAGNOSIS — K21.9 GASTROESOPHAGEAL REFLUX DISEASE WITHOUT ESOPHAGITIS: ICD-10-CM

## 2022-07-25 DIAGNOSIS — Z12.11 COLON CANCER SCREENING: ICD-10-CM

## 2022-07-25 DIAGNOSIS — R10.13 DYSPEPSIA: ICD-10-CM

## 2022-07-25 DIAGNOSIS — Z01.818 PRE-OP TESTING: Primary | ICD-10-CM

## 2022-07-25 DIAGNOSIS — K85.90 ACUTE PANCREATITIS, UNSPECIFIED COMPLICATION STATUS, UNSPECIFIED PANCREATITIS TYPE: ICD-10-CM

## 2022-07-25 LAB — GLUCOSE BLDC GLUCOMTR-MCNC: 160 MG/DL (ref 70–99)

## 2022-07-25 PROCEDURE — 45385 COLONOSCOPY W/LESION REMOVAL: CPT | Performed by: INTERNAL MEDICINE

## 2022-07-25 PROCEDURE — 0DBL8ZX EXCISION OF TRANSVERSE COLON, VIA NATURAL OR ARTIFICIAL OPENING ENDOSCOPIC, DIAGNOSTIC: ICD-10-PCS | Performed by: INTERNAL MEDICINE

## 2022-07-25 PROCEDURE — BD47ZZZ ULTRASONOGRAPHY OF GASTROINTESTINAL TRACT: ICD-10-PCS | Performed by: INTERNAL MEDICINE

## 2022-07-25 PROCEDURE — 43259 EGD US EXAM DUODENUM/JEJUNUM: CPT | Performed by: INTERNAL MEDICINE

## 2022-07-25 PROCEDURE — 0DB78ZX EXCISION OF STOMACH, PYLORUS, VIA NATURAL OR ARTIFICIAL OPENING ENDOSCOPIC, DIAGNOSTIC: ICD-10-PCS | Performed by: INTERNAL MEDICINE

## 2022-07-25 PROCEDURE — 0DJ08ZZ INSPECTION OF UPPER INTESTINAL TRACT, VIA NATURAL OR ARTIFICIAL OPENING ENDOSCOPIC: ICD-10-PCS | Performed by: INTERNAL MEDICINE

## 2022-07-25 PROCEDURE — 43239 EGD BIOPSY SINGLE/MULTIPLE: CPT | Performed by: INTERNAL MEDICINE

## 2022-07-25 RX ORDER — SODIUM CHLORIDE, SODIUM LACTATE, POTASSIUM CHLORIDE, CALCIUM CHLORIDE 600; 310; 30; 20 MG/100ML; MG/100ML; MG/100ML; MG/100ML
INJECTION, SOLUTION INTRAVENOUS CONTINUOUS
Status: DISCONTINUED | OUTPATIENT
Start: 2022-07-25 | End: 2022-07-25

## 2022-07-25 RX ORDER — LIDOCAINE HYDROCHLORIDE 10 MG/ML
INJECTION, SOLUTION EPIDURAL; INFILTRATION; INTRACAUDAL; PERINEURAL AS NEEDED
Status: DISCONTINUED | OUTPATIENT
Start: 2022-07-25 | End: 2022-07-25 | Stop reason: SURG

## 2022-07-25 RX ADMIN — LIDOCAINE HYDROCHLORIDE 50 MG: 10 INJECTION, SOLUTION EPIDURAL; INFILTRATION; INTRACAUDAL; PERINEURAL at 07:53:00

## 2022-07-25 RX ADMIN — SODIUM CHLORIDE, SODIUM LACTATE, POTASSIUM CHLORIDE, CALCIUM CHLORIDE: 600; 310; 30; 20 INJECTION, SOLUTION INTRAVENOUS at 07:49:00

## 2022-08-02 ENCOUNTER — TELEPHONE (OUTPATIENT)
Dept: GASTROENTEROLOGY | Facility: CLINIC | Age: 52
End: 2022-08-02

## 2022-08-02 ENCOUNTER — OFFICE VISIT (OUTPATIENT)
Dept: INTERNAL MEDICINE CLINIC | Facility: CLINIC | Age: 52
End: 2022-08-02
Payer: COMMERCIAL

## 2022-08-02 VITALS
HEART RATE: 78 BPM | OXYGEN SATURATION: 99 % | HEIGHT: 71 IN | BODY MASS INDEX: 28.28 KG/M2 | TEMPERATURE: 98 F | WEIGHT: 202 LBS | RESPIRATION RATE: 18 BRPM | DIASTOLIC BLOOD PRESSURE: 80 MMHG | SYSTOLIC BLOOD PRESSURE: 122 MMHG

## 2022-08-02 DIAGNOSIS — E11.9 TYPE 2 DIABETES MELLITUS WITHOUT COMPLICATION, WITHOUT LONG-TERM CURRENT USE OF INSULIN (HCC): Primary | ICD-10-CM

## 2022-08-02 DIAGNOSIS — F41.8 DEPRESSION WITH ANXIETY: ICD-10-CM

## 2022-08-02 PROCEDURE — 3074F SYST BP LT 130 MM HG: CPT | Performed by: INTERNAL MEDICINE

## 2022-08-02 PROCEDURE — 3079F DIAST BP 80-89 MM HG: CPT | Performed by: INTERNAL MEDICINE

## 2022-08-02 PROCEDURE — 99214 OFFICE O/P EST MOD 30 MIN: CPT | Performed by: INTERNAL MEDICINE

## 2022-08-02 PROCEDURE — 3008F BODY MASS INDEX DOCD: CPT | Performed by: INTERNAL MEDICINE

## 2022-08-02 RX ORDER — LAMOTRIGINE 25 MG/1
50 TABLET ORAL DAILY
COMMUNITY
Start: 2022-07-29

## 2022-08-02 RX ORDER — PAROXETINE HYDROCHLORIDE 20 MG/1
1 TABLET, FILM COATED ORAL DAILY
COMMUNITY
Start: 2022-07-29

## 2022-08-02 NOTE — TELEPHONE ENCOUNTER
Rivera Young MD  P Em Gi Clinical Staff  GI staff: please place recall for colonoscopy in 3 years       Letter sent on 8/2/2022  [facility] updated  Patient Outreach updated

## 2022-08-03 ENCOUNTER — TELEPHONE (OUTPATIENT)
Dept: INTERNAL MEDICINE CLINIC | Facility: CLINIC | Age: 52
End: 2022-08-03

## 2022-08-03 PROCEDURE — 3052F HG A1C>EQUAL 8.0%<EQUAL 9.0%: CPT | Performed by: INTERNAL MEDICINE

## 2022-08-03 NOTE — TELEPHONE ENCOUNTER
Pt stated he was seen yesterday , requesting Dr to call him regarding paper work- he did not get a call to pick it up today , also need a Dr statement for work - mentioning his therapy that's need, did not want to explain further, wants Dr to call him

## 2022-08-04 DIAGNOSIS — R73.01 IFG (IMPAIRED FASTING GLUCOSE): Primary | ICD-10-CM

## 2022-08-04 LAB — HEMOGLOBIN A1C: 8.3 % OF TOTAL HGB

## 2022-08-04 NOTE — TELEPHONE ENCOUNTER
Patient states he does have additional question that he would like to speak with you about a note for work but he wanted to discuss the details with you.

## 2022-08-09 ENCOUNTER — TELEPHONE (OUTPATIENT)
Dept: INTERNAL MEDICINE CLINIC | Facility: CLINIC | Age: 52
End: 2022-08-09

## 2022-08-09 NOTE — TELEPHONE ENCOUNTER
3 forms received in forms dept. Disab, disab questionnaire and mental capacity form. Logged for processing. Sent Zevia message for forms fee.

## 2022-08-10 ENCOUNTER — TELEPHONE (OUTPATIENT)
Dept: INTERNAL MEDICINE CLINIC | Facility: CLINIC | Age: 52
End: 2022-08-10

## 2022-08-10 NOTE — TELEPHONE ENCOUNTER
Pt is requesting a return to work note. His PCP is not in the office. Advised and set up virtual visit to discuss this.

## 2022-08-16 NOTE — TELEPHONE ENCOUNTER
Dr. Eder Velasquez,    **2 forms needing your signature**     Please sign off on form: Disab and mental health questionnaire  -Highlight the patient and hit \"Chart\" button. -In Chart Review, w/in the Encounter tab - click 1 time on the Telephone call encounter for 8/9/22 Scroll down the telephone encounter.  -Click \"scan on\" blue Hyperlink under \"Media\" heading for Shayna Velasquez 8/16/22 Natanael Velasquez 8/16/11 w/in the telephone enc.  -Click on Acknowledge button at the bottom right corner and left-click onto image, signature stamp appears and drag signature to Provider signature line. Stamp will turn blue. Close window. Thank you,    Garret Ewing.

## 2022-09-21 RX ORDER — GLIPIZIDE 5 MG/1
5 TABLET ORAL
Qty: 30 TABLET | Refills: 5 | Status: SHIPPED | OUTPATIENT
Start: 2022-09-21

## 2022-09-21 NOTE — TELEPHONE ENCOUNTER
Please review. Protocol failed / No protocol.    Requested Prescriptions   Pending Prescriptions Disp Refills    GLIPIZIDE 5 MG Oral Tab [Pharmacy Med Name: GLIPIZIDE 5 MG TABLET] 30 tablet 5     Sig: Take 1 tablet (5 mg total) by mouth before breakfast.        Diabetes Medication Protocol Failed - 9/21/2022 12:01 AM        Failed - Last A1C < 7.5 and within past 6 months     Lab Results   Component Value Date    A1C 8.3 (H) 08/03/2022               Passed - In person appointment or virtual visit in the past 6 mos or appointment in next 3 mos       Recent Outpatient Visits              1 month ago Alcohol abuse, in [address], Kirsten Fish, APRN    Telemedicine    1 month ago Type 2 diabetes mellitus without complication, without long-term current use of insulin [facility])    Chadd Pascal, [address] [facility],[address] [facility], Jos Mix MD    Office Visit    2 months ago Depression with anxiety    Fl[facility] Pascal, [address] Rebecca Clements MD    Office Visit    2 months ago Depression with anxiety    Chadd Pascal, [address] [facility] [facility], Jos Mix MD    Office Visit    3 months ago Depression with anxiety    [facility], [address] Rebecca Clements MD    Office Visit     Future Appointments         Provider Department Appt Notes    In 1 month MD Chadd Granado, [address] [facility],[address] [facility], [place] 3 month fu                Passed - EGFRCR or GFRNAA > 50     GFR Evaluation  GFRNAA: 100 , resulted on 1/3/2022            Passed - GFR in the past 12 months             Future Appointments         Provider Department Appt Notes    In 1 month MD Chadd Granado, [address] [facility], [facility] 3 month fu            Recent Outpatient Visits              1 month ago Alcohol abuse, in remission    Chadd Pascal, [address] [facility],[address] [facility], Kirsten Fish, APRN    Telemedicine    1 month ago Type 2 diabetes mellitus without complication, without long-term current use of insulin [facility])    Chica Cortez Sunny Nobles, MD    Office Visit    2 months ago Depression with anxiety    Elner Mohs, MD    Office Visit    2 months ago Depression with anxiety    Chica Cortez Sunny Nobles, MD    Office Visit    3 months ago Depression with anxiety    [address] Malachi Dorado MD    Office Visit

## 2022-10-21 ENCOUNTER — OFFICE VISIT (OUTPATIENT)
Dept: INTERNAL MEDICINE CLINIC | Facility: CLINIC | Age: 52
End: 2022-10-21
Payer: COMMERCIAL

## 2022-10-21 VITALS
RESPIRATION RATE: 17 BRPM | OXYGEN SATURATION: 98 % | WEIGHT: 202 LBS | HEART RATE: 78 BPM | HEIGHT: 71 IN | TEMPERATURE: 98 F | DIASTOLIC BLOOD PRESSURE: 76 MMHG | BODY MASS INDEX: 28.28 KG/M2 | SYSTOLIC BLOOD PRESSURE: 116 MMHG

## 2022-10-21 DIAGNOSIS — L40.9 PSORIASIS: ICD-10-CM

## 2022-10-21 DIAGNOSIS — E11.9 TYPE 2 DIABETES MELLITUS WITHOUT COMPLICATION, WITHOUT LONG-TERM CURRENT USE OF INSULIN (HCC): Primary | ICD-10-CM

## 2022-10-21 DIAGNOSIS — M79.671 RIGHT FOOT PAIN: ICD-10-CM

## 2022-10-21 DIAGNOSIS — F41.8 DEPRESSION WITH ANXIETY: ICD-10-CM

## 2022-10-21 DIAGNOSIS — F10.11 ALCOHOL ABUSE, IN REMISSION: ICD-10-CM

## 2022-10-21 DIAGNOSIS — F19.11 DRUG ABUSE IN REMISSION (HCC): ICD-10-CM

## 2022-10-21 PROCEDURE — 90686 IIV4 VACC NO PRSV 0.5 ML IM: CPT | Performed by: INTERNAL MEDICINE

## 2022-10-21 PROCEDURE — 3074F SYST BP LT 130 MM HG: CPT | Performed by: INTERNAL MEDICINE

## 2022-10-21 PROCEDURE — 3008F BODY MASS INDEX DOCD: CPT | Performed by: INTERNAL MEDICINE

## 2022-10-21 PROCEDURE — 90471 IMMUNIZATION ADMIN: CPT | Performed by: INTERNAL MEDICINE

## 2022-10-21 PROCEDURE — 99214 OFFICE O/P EST MOD 30 MIN: CPT | Performed by: INTERNAL MEDICINE

## 2022-10-21 PROCEDURE — 3078F DIAST BP <80 MM HG: CPT | Performed by: INTERNAL MEDICINE

## 2022-11-09 ENCOUNTER — OFFICE VISIT (OUTPATIENT)
Dept: DERMATOLOGY CLINIC | Facility: CLINIC | Age: 52
End: 2022-11-09
Payer: COMMERCIAL

## 2022-11-09 ENCOUNTER — TELEPHONE (OUTPATIENT)
Dept: DERMATOLOGY CLINIC | Facility: CLINIC | Age: 52
End: 2022-11-09

## 2022-11-09 DIAGNOSIS — L40.0 PSORIASIS VULGARIS: Primary | ICD-10-CM

## 2022-11-09 PROCEDURE — 99214 OFFICE O/P EST MOD 30 MIN: CPT | Performed by: STUDENT IN AN ORGANIZED HEALTH CARE EDUCATION/TRAINING PROGRAM

## 2022-11-09 RX ORDER — ONDANSETRON 4 MG/1
TABLET, FILM COATED ORAL
COMMUNITY
Start: 2022-10-13

## 2022-11-09 RX ORDER — IBUPROFEN 800 MG/1
TABLET ORAL
COMMUNITY
Start: 2022-05-26

## 2022-11-09 RX ORDER — HYDROCODONE BITARTRATE AND ACETAMINOPHEN 5; 300 MG/1; MG/1
TABLET ORAL
COMMUNITY
Start: 2022-05-26

## 2022-11-09 RX ORDER — AMOXICILLIN 500 MG/1
TABLET, FILM COATED ORAL
COMMUNITY
Start: 2022-05-26

## 2022-11-09 RX ORDER — SECUKINUMAB 150 MG/ML
INJECTION SUBCUTANEOUS
Qty: 2 EACH | Refills: 11 | Status: CANCELLED | OUTPATIENT
Start: 2022-11-09

## 2022-11-09 RX ORDER — FLUOXETINE HYDROCHLORIDE 20 MG/1
CAPSULE ORAL
COMMUNITY
Start: 2022-07-20

## 2022-11-09 RX ORDER — FOLIC ACID 1 MG/1
TABLET ORAL
COMMUNITY
Start: 2022-09-26

## 2022-11-09 RX ORDER — SECUKINUMAB 150 MG/ML
INJECTION SUBCUTANEOUS
Qty: 8 EACH | Refills: 0 | Status: CANCELLED | OUTPATIENT
Start: 2022-11-09

## 2022-11-09 NOTE — TELEPHONE ENCOUNTER
Pt starting [employer], forms partially filled out, to DM for review/signature. RXs pended. Pt will do his own injections at home, will need meds sent to the office.

## 2022-11-09 NOTE — TELEPHONE ENCOUNTER
Forms reviewed/signed by ROMA, faxed to [employer] and [employer] connect along with chart notes and INS.  Placed in [place] folder, Dr. Gisselle Hatch kindly review/sign below RXs for electronic trail

## 2022-11-14 NOTE — TELEPHONE ENCOUNTER
[employer] states they received notification from pt's insurance that pt's medication was denied by his insurance. Faxed to [facility] [place] for next steps.

## 2022-11-29 NOTE — TELEPHONE ENCOUNTER
Spoke with Tom Martinez [facility]) at [facility] [phone] and provided verbal rx. PA is needed. Nurse was transferred to [place] department. 90% of PA completed via phone but representative states they still need to fax a form for additional questions and need pt's chart notes faxed to [facility]. Clinical staff - please complete PA form once we receive it and fax back as urgent with pt's chart notes. Pt provided with an update and requesting another update once the forms have been faxed. Thank you.

## 2022-11-30 NOTE — TELEPHONE ENCOUNTER
Fax reviewed - this fax came from [employer] stating they contacted [employer] and PA was denied - I called [employer] and they confirmed PA was denied 11/29/22. They will fax us the denial letter with next steps.  Awaiting denial.

## 2022-12-01 NOTE — TELEPHONE ENCOUNTER
11/14 - Spoke with [employer] Rep Jules Suh) at [phone] and she states they are aware of the PA denial and pt was enrolled in the [employer] covered until you are covered program since PA was denied. Pt contacted for shipment. Pt eligible to receive the medication for up to 2 years. Dr. Elliot Woo - the only thing our office needs to do is write a letter of medical necessity for pt and staff can fax it to [employer] appeals dept at [phone] so that pt can continue to receive the medication. Thank you.

## 2022-12-02 ENCOUNTER — OFFICE VISIT (OUTPATIENT)
Dept: RHEUMATOLOGY | Facility: CLINIC | Age: 52
End: 2022-12-02
Payer: COMMERCIAL

## 2022-12-02 VITALS
HEART RATE: 81 BPM | DIASTOLIC BLOOD PRESSURE: 82 MMHG | HEIGHT: 71 IN | BODY MASS INDEX: 28 KG/M2 | SYSTOLIC BLOOD PRESSURE: 128 MMHG | WEIGHT: 200 LBS

## 2022-12-02 DIAGNOSIS — L40.9 PSORIASIS: Primary | ICD-10-CM

## 2022-12-02 DIAGNOSIS — M25.50 POLYARTHRALGIA: ICD-10-CM

## 2022-12-02 PROCEDURE — 99204 OFFICE O/P NEW MOD 45 MIN: CPT | Performed by: INTERNAL MEDICINE

## 2022-12-02 PROCEDURE — 3074F SYST BP LT 130 MM HG: CPT | Performed by: INTERNAL MEDICINE

## 2022-12-02 PROCEDURE — 3079F DIAST BP 80-89 MM HG: CPT | Performed by: INTERNAL MEDICINE

## 2022-12-02 PROCEDURE — 3008F BODY MASS INDEX DOCD: CPT | Performed by: INTERNAL MEDICINE

## 2022-12-02 NOTE — PATIENT INSTRUCTIONS
You were seen today for joint pain and also history of psoriasis  Psoriasis can cause psoriatic arthritis which is a lot of swelling in the joints, tendinitis  You already will be going on Cos[employer]x which treat psoriasis and psoriatic arthritis, lets see how you do on this  The numbness in the arms could be more of a nerve compression  May have to get nerve test done if you continue to have symptoms  See me in the next 3 months

## 2023-01-06 NOTE — TELEPHONE ENCOUNTER
Dr. Brandy Meléndez - just following up on this request.  [employer] called to check the status of the appeal.  Unfortunately this is the only medication that Dax Orozco is not able to assist with for some reason. Would you like to draft an appeal letter for this pt? They require an appeal submission in order for pt to continue with the [employer] program.  Thank you.

## 2023-01-11 NOTE — TELEPHONE ENCOUNTER
Per DM - cosentyx needs to be cancelled at [facility] - called [employer], on hold over 25 min [phone]. Called [employer] back again, per automated system, wait time over 23 min, I requested a call back at this time.      Staff - once [employer] calls back, please cancel RX for cosentyx

## 2023-01-24 NOTE — TELEPHONE ENCOUNTER
Called patient, no answer. Left VM to call us back to let us know if he has had eye exam done. Was instructed to have drs office send us a copy of the notes.   If no eye exam, to let us know so we cn give him a referral. 0

## 2023-01-27 NOTE — TELEPHONE ENCOUNTER
Patient called very upset regarding cancelling [employer]x. Patient stated he was never informed this was happening and feels he is being mistreated. Per documentation, our office had left a message Jan 11, patient did not call back till now and states he never had a missed call or VM. He would like a personal call from DM today (Friday) explaining the reasoning, informed not in clinic till Monday. Informed patient that our records it was being denied and coverage was not available without an appeal letter. He was very unhappy with the answer provided by staff. An appointment is made for Thursday to discuss treatment options but the patient is still not pleased with waiting till Thursday. He was requesting an appointment on Monday, informed him that Thursday is the soonest available to discuss with DM. Pt said he will be showing up to the clinic and will not leave until he can talk with the doctor. After asking, what needs to be discussed on Monday that cannot wait till Thursday at his scheduled appointment pt stated \"its private between me and my doctor\". Patient was told multiple times a message would be sent to DM with the following complaint and will get back to him when available during clinic hours. Please advise, ty.    -confirmed cell phone number patient would like to be contacted at   [phone].

## 2023-01-27 NOTE — TELEPHONE ENCOUNTER
Please review. Protocol failed / No protocol. Requested Prescriptions   Pending Prescriptions Disp Refills    metFORMIN HCl 1000 MG Oral Tab 180 tablet 1     Sig: Take 1 tablet (1,000 mg total) by mouth 2 (two) times daily with meals.        Diabetes Medication Protocol Failed - 1/26/2023  3:00 PM        Failed - Last A1C < 7.5 and within past 6 months     Lab Results   Component Value Date    A1C 8.3 (H) 08/03/2022             Failed - EGFRCR or GFRNAA > 50     GFR Evaluation            Failed - GFR in the past 12 months        Passed - In person appointment or virtual visit in the past 6 mos or appointment in next 3 mos     Recent Outpatient Visits              1 month ago Anish Edmondson MD    Office Visit    2 months ago Psoriasis vulgaris    Kimberly Schaeffer MD    Office Visit    3 months ago Type 2 diabetes mellitus without complication, without long-term current use of insulin ([address]    Yong Valencia, [address], Nicola Clemens MD    Office Visit    5 months ago Alcohol abuse, in remission    Brynn Quezada [place], APRN    Telemedicine    5 months ago Type 2 diabetes mellitus without complication, without long-term current use of insulin ([address]    Nicola Quezada MD    Office Visit          Future Appointments         Provider Department Appt Notes    In 1 month MD Yong Mcrae, [address], [place] 3 month fu     1/18/23 unable to connect with patient, payment plan suspended, please try to collect payment                    Recent Outpatient Visits              1 month ago Inna Jacobson, [address], Candance Baba, MD    Office Visit    2 months ago Psoriasis vulgaris    [facility] Chris Manrique Daniel, MD    Office Visit    3 months ago Type 2 diabetes mellitus without complication, without long-term current use of insulin ([facility]    [address], [address], Ren Razo MD    Office Visit    5 months ago Alcohol abuse, in remission    Brynn Cota [place], APRN    Telemedicine    5 months ago Type 2 diabetes mellitus without complication, without long-term current use of insulin ([facility]    Ren Cota MD    Office Visit            Future Appointments         Provider Department Appt Notes    In 1 month Jade Spencer MD [address], [address], [place] 3 month fu     1/18/23 unable to connect with patient, payment plan suspended, please try to collect payment

## 2023-01-30 RX ORDER — FOLIC ACID 1 MG/1
TABLET ORAL
Refills: 0 | Status: CANCELLED | OUTPATIENT
Start: 2023-01-30

## 2023-01-30 RX ORDER — FOLIC ACID 1 MG/1
1 TABLET ORAL DAILY
Qty: 90 TABLET | Refills: 1 | Status: SHIPPED | OUTPATIENT
Start: 2023-01-30

## 2023-01-31 ENCOUNTER — TELEPHONE (OUTPATIENT)
Dept: DERMATOLOGY CLINIC | Facility: CLINIC | Age: 53
End: 2023-01-31

## 2023-01-31 RX ORDER — OMEPRAZOLE 40 MG/1
40 CAPSULE, DELAYED RELEASE ORAL DAILY
Qty: 90 CAPSULE | Refills: 1 | Status: SHIPPED | OUTPATIENT
Start: 2023-01-31 | End: 2023-02-02

## 2023-01-31 RX ORDER — SILDENAFIL 100 MG/1
100 TABLET, FILM COATED ORAL DAILY PRN
Qty: 18 TABLET | Refills: 1 | Status: SHIPPED | OUTPATIENT
Start: 2023-01-31 | End: 2023-02-02

## 2023-01-31 NOTE — TELEPHONE ENCOUNTER
Patient called at [phone]. No answer. My chart       Message sent to patient.      Julieth Proctor MD

## 2023-02-02 ENCOUNTER — PATIENT MESSAGE (OUTPATIENT)
Dept: DERMATOLOGY CLINIC | Facility: CLINIC | Age: 53
End: 2023-02-02

## 2023-02-02 ENCOUNTER — TELEPHONE (OUTPATIENT)
Dept: INTERNAL MEDICINE CLINIC | Facility: CLINIC | Age: 53
End: 2023-02-02

## 2023-02-02 ENCOUNTER — TELEPHONE (OUTPATIENT)
Dept: DERMATOLOGY CLINIC | Facility: CLINIC | Age: 53
End: 2023-02-02

## 2023-02-02 ENCOUNTER — OFFICE VISIT (OUTPATIENT)
Dept: DERMATOLOGY CLINIC | Facility: CLINIC | Age: 53
End: 2023-02-02

## 2023-02-02 DIAGNOSIS — L40.0 PSORIASIS VULGARIS: Primary | ICD-10-CM

## 2023-02-02 PROCEDURE — 99213 OFFICE O/P EST LOW 20 MIN: CPT | Performed by: STUDENT IN AN ORGANIZED HEALTH CARE EDUCATION/TRAINING PROGRAM

## 2023-02-02 RX ORDER — SILDENAFIL 100 MG/1
100 TABLET, FILM COATED ORAL DAILY PRN
Qty: 18 TABLET | Refills: 1 | Status: SHIPPED | OUTPATIENT
Start: 2023-02-02

## 2023-02-02 RX ORDER — OMEPRAZOLE 40 MG/1
40 CAPSULE, DELAYED RELEASE ORAL DAILY
Qty: 90 CAPSULE | Refills: 1 | Status: SHIPPED | OUTPATIENT
Start: 2023-02-02

## 2023-02-02 NOTE — TELEPHONE ENCOUNTER
Dr. Laverne Murray from [employer] (Joie's replacement) is going to research what it will take to reinstate pt's [employer] program and will call us tomorrow with an update. Patti Murray states he thinks it might only take a new enrollment form but pt might be able to sign the hippa agreement online and not even have to come back into the office. I will provide another update once I have one from Patti Murray.

## 2023-02-02 NOTE — TELEPHONE ENCOUNTER
Left message with [employer]  Samia Landa) at [phone] to investigate why pt was cancelled from the [employer].  Pt was receiving the medication previously but it was cancelled. Await cb from [facility].

## 2023-02-03 NOTE — TELEPHONE ENCOUNTER
Eh Randall from [employer] called to provide an update. [employer] is currently conducting a benefits investigation and our office should have an update on whether or not this needs a PA (which our office will work on). Pt will be eligible for [employer] Bridge program no matter what and could start the injections again as early as Friday next week. Pt provided with an update via [employer].

## 2023-02-07 ENCOUNTER — TELEPHONE (OUTPATIENT)
Dept: DERMATOLOGY CLINIC | Facility: CLINIC | Age: 53
End: 2023-02-07

## 2023-02-07 NOTE — TELEPHONE ENCOUNTER
PA completed via phone with [employer] at [phone], chart notes requested and faxed to [phone] (-[phone]).   Pt will still be receiving the [employer] through the Personal support program but a PA attempt is required in order for pt to continue to receive the medication through the [employer] program.

## 2023-02-07 NOTE — TELEPHONE ENCOUNTER
Pt called to ask for an update. Spoke with Alessia Osorio and he states that once we have the PA determination, please fax directly to [employer] [phone] and as soon as they get the determination, pt could be triaged immediately to the [facility] and may still receive the medication by Friday. Pt provided with an update.

## 2023-02-08 ENCOUNTER — PATIENT MESSAGE (OUTPATIENT)
Dept: INTERNAL MEDICINE CLINIC | Facility: CLINIC | Age: 53
End: 2023-02-08

## 2023-02-08 ENCOUNTER — OFFICE VISIT (OUTPATIENT)
Dept: RHEUMATOLOGY | Facility: CLINIC | Age: 53
End: 2023-02-08

## 2023-02-08 VITALS
BODY MASS INDEX: 29.26 KG/M2 | DIASTOLIC BLOOD PRESSURE: 86 MMHG | WEIGHT: 209 LBS | HEART RATE: 79 BPM | SYSTOLIC BLOOD PRESSURE: 136 MMHG | HEIGHT: 71 IN

## 2023-02-08 DIAGNOSIS — L40.9 PSORIASIS: ICD-10-CM

## 2023-02-08 DIAGNOSIS — R20.2 NUMBNESS AND TINGLING OF HAND: Primary | ICD-10-CM

## 2023-02-08 DIAGNOSIS — R20.0 NUMBNESS AND TINGLING OF HAND: Primary | ICD-10-CM

## 2023-02-08 DIAGNOSIS — M65.332 TRIGGER FINGER, LEFT MIDDLE FINGER: ICD-10-CM

## 2023-02-08 PROCEDURE — A4570 SPLINT: HCPCS | Performed by: INTERNAL MEDICINE

## 2023-02-08 PROCEDURE — 3008F BODY MASS INDEX DOCD: CPT | Performed by: INTERNAL MEDICINE

## 2023-02-08 PROCEDURE — 20550 NJX 1 TENDON SHEATH/LIGAMENT: CPT | Performed by: INTERNAL MEDICINE

## 2023-02-08 PROCEDURE — 99214 OFFICE O/P EST MOD 30 MIN: CPT | Performed by: INTERNAL MEDICINE

## 2023-02-08 PROCEDURE — 3075F SYST BP GE 130 - 139MM HG: CPT | Performed by: INTERNAL MEDICINE

## 2023-02-08 PROCEDURE — 3079F DIAST BP 80-89 MM HG: CPT | Performed by: INTERNAL MEDICINE

## 2023-02-08 RX ORDER — GABAPENTIN 100 MG/1
100 CAPSULE ORAL 3 TIMES DAILY
Qty: 30 CAPSULE | Refills: 0 | Status: SHIPPED | OUTPATIENT
Start: 2023-02-08

## 2023-02-08 RX ORDER — METHYLPREDNISOLONE ACETATE 80 MG/ML
20 INJECTION, SUSPENSION INTRA-ARTICULAR; INTRALESIONAL; INTRAMUSCULAR; SOFT TISSUE ONCE
Status: COMPLETED | OUTPATIENT
Start: 2023-02-08 | End: 2023-02-08

## 2023-02-08 RX ADMIN — METHYLPREDNISOLONE ACETATE 20 MG: 80 INJECTION, SUSPENSION INTRA-ARTICULAR; INTRALESIONAL; INTRAMUSCULAR; SOFT TISSUE at 11:15:00

## 2023-02-08 NOTE — PATIENT INSTRUCTIONS
You were seen today for pain in your hands with numbness and tingling and shooting pain  It sounds more like a nerve issue like carpal tunnel  You do not have swelling consistent with psoriatic arthritis but again you are on Cosentyx which helps treat it  Plan to have you get the nerve test of both arms  I prescribed gabapentin 100 mg at night to see if it helps your symptoms  We injected your left third finger with cortisone  Once I get the results I will inform you of them  Blood work  Also use wrist splints at night

## 2023-02-08 NOTE — PROCEDURES
With  consent, I injected the patient's L 3rd A1- pulley with 0.25ml lidocaine  1% and 0.25ml depo 80. It was under sterile technique using iodine and alcohol swabs and ethyl chloride was used as an anaesthetic spray. Pt.  tolerated it well.

## 2023-02-08 NOTE — TELEPHONE ENCOUNTER
S/w Colleen Dey from [employer] connect at [phone], Colleen Dey confirmed PA in process for [employer], they will need the decision faxed to them (see below TE) Colleen Dey states they do not need an RX at this time, they can use the enrollment form. DM updated.

## 2023-02-13 NOTE — TELEPHONE ENCOUNTER
Pt provided with an update. Left message for Paige Osorio at [facility] to confirm when pt will be receiving medication.

## 2023-02-13 NOTE — TELEPHONE ENCOUNTER
Dr. Kira Partida - [employer] confirmed that they received the PA chris and pt has been confirmed in their support program.  The next step is to file an appeal.  Dr. Kira Partida - letter is in your office for review. Can you please generate an appeal letter that we can fax to pt's insurance? Thank you.

## 2023-02-13 NOTE — TELEPHONE ENCOUNTER
John Lockwood returned call to confirm [employer] does have everything they need on their end and pt should receive his medication within the next 24 - 48 hrs. John Lockwood states it sometimes takes a little longer for the pharmacy to receive the update from the [employer] hub.

## 2023-02-14 ENCOUNTER — HOSPITAL ENCOUNTER (OUTPATIENT)
Facility: HOSPITAL | Age: 53
Setting detail: OBSERVATION
Discharge: HOME | End: 2023-02-16
Attending: EMERGENCY MEDICINE | Admitting: INTERNAL MEDICINE
Payer: COMMERCIAL

## 2023-02-14 ENCOUNTER — HOSPITAL ENCOUNTER (OUTPATIENT)
Dept: CT IMAGING | Facility: HOSPITAL | Age: 53
Discharge: HOME | End: 2023-02-14
Attending: INTERNAL MEDICINE
Payer: COMMERCIAL

## 2023-02-14 ENCOUNTER — OFFICE VISIT (OUTPATIENT)
Dept: INTERNAL MEDICINE CLINIC | Facility: CLINIC | Age: 53
End: 2023-02-14

## 2023-02-14 ENCOUNTER — HOSPITAL ENCOUNTER (INPATIENT)
Facility: HOSPITAL | Age: 53
LOS: 2 days | Discharge: HOME | End: 2023-02-16
Attending: EMERGENCY MEDICINE | Admitting: INTERNAL MEDICINE
Payer: COMMERCIAL

## 2023-02-14 ENCOUNTER — TELEPHONE (OUTPATIENT)
Dept: INTERNAL MEDICINE CLINIC | Facility: CLINIC | Age: 53
End: 2023-02-14

## 2023-02-14 ENCOUNTER — LAB ENCOUNTER (OUTPATIENT)
Dept: LAB | Facility: HOSPITAL | Age: 53
End: 2023-02-14
Attending: INTERNAL MEDICINE
Payer: COMMERCIAL

## 2023-02-14 VITALS
TEMPERATURE: 98 F | HEART RATE: 71 BPM | DIASTOLIC BLOOD PRESSURE: 74 MMHG | SYSTOLIC BLOOD PRESSURE: 128 MMHG | HEIGHT: 71 IN | OXYGEN SATURATION: 99 % | WEIGHT: 208 LBS | BODY MASS INDEX: 29.12 KG/M2 | RESPIRATION RATE: 18 BRPM

## 2023-02-14 DIAGNOSIS — R11.0 NAUSEA: ICD-10-CM

## 2023-02-14 DIAGNOSIS — R10.13 EPIGASTRIC PAIN: ICD-10-CM

## 2023-02-14 DIAGNOSIS — R10.11 RIGHT UPPER QUADRANT ABDOMINAL PAIN: ICD-10-CM

## 2023-02-14 DIAGNOSIS — Z87.19 HISTORY OF PANCREATITIS: ICD-10-CM

## 2023-02-14 DIAGNOSIS — R10.13 EPIGASTRIC PAIN: Primary | ICD-10-CM

## 2023-02-14 DIAGNOSIS — K85.90 ACUTE PANCREATITIS, UNSPECIFIED COMPLICATION STATUS, UNSPECIFIED PANCREATITIS TYPE: Primary | ICD-10-CM

## 2023-02-14 LAB
ANION GAP SERPL CALC-SCNC: 3 MMOL/L (ref 0–18)
BASOPHILS # BLD AUTO: 0.04 X10(3) UL (ref 0–0.2)
BASOPHILS NFR BLD AUTO: 0.6 %
BUN BLD-MCNC: 14 MG/DL (ref 7–18)
BUN/CREAT SERPL: 13.2 (ref 10–20)
CALCIUM BLD-MCNC: 9.9 MG/DL (ref 8.5–10.1)
CHLORIDE SERPL-SCNC: 107 MMOL/L (ref 98–112)
CO2 SERPL-SCNC: 28 MMOL/L (ref 21–32)
CREAT BLD-MCNC: 1.06 MG/DL
DEPRECATED RDW RBC AUTO: 37.3 FL (ref 35.1–46.3)
EOSINOPHIL # BLD AUTO: 0.1 X10(3) UL (ref 0–0.7)
EOSINOPHIL NFR BLD AUTO: 1.6 %
ERYTHROCYTE [DISTWIDTH] IN BLOOD BY AUTOMATED COUNT: 11.4 % (ref 11–15)
FASTING STATUS PATIENT QL REPORTED: NO
GFR SERPLBLD BASED ON 1.73 SQ M-ARVRAT: 85 ML/MIN/1.73M2 (ref 60–?)
GLUCOSE BLD-MCNC: 227 MG/DL (ref 70–99)
GLUCOSE BLDC GLUCOMTR-MCNC: 129 MG/DL (ref 70–99)
HCT VFR BLD AUTO: 45.4 %
HGB BLD-MCNC: 15.7 G/DL
IMM GRANULOCYTES # BLD AUTO: 0.02 X10(3) UL (ref 0–1)
IMM GRANULOCYTES NFR BLD: 0.3 %
LIPASE SERPL-CCNC: 137 U/L (ref 13–75)
LIPASE SERPL-CCNC: 565 U/L (ref 73–393)
LYMPHOCYTES # BLD AUTO: 1.6 X10(3) UL (ref 1–4)
LYMPHOCYTES NFR BLD AUTO: 24.9 %
MCH RBC QN AUTO: 30.9 PG (ref 26–34)
MCHC RBC AUTO-ENTMCNC: 34.6 G/DL (ref 31–37)
MCV RBC AUTO: 89.4 FL
MONOCYTES # BLD AUTO: 0.54 X10(3) UL (ref 0.1–1)
MONOCYTES NFR BLD AUTO: 8.4 %
NEUTROPHILS # BLD AUTO: 4.12 X10 (3) UL (ref 1.5–7.7)
NEUTROPHILS # BLD AUTO: 4.12 X10(3) UL (ref 1.5–7.7)
NEUTROPHILS NFR BLD AUTO: 64.2 %
OSMOLALITY SERPL CALC.SUM OF ELEC: 294 MOSM/KG (ref 275–295)
PLATELET # BLD AUTO: 249 10(3)UL (ref 150–450)
POTASSIUM SERPL-SCNC: 4.5 MMOL/L (ref 3.5–5.1)
RBC # BLD AUTO: 5.08 X10(6)UL
SARS-COV-2 RNA RESP QL NAA+PROBE: NOT DETECTED
SODIUM SERPL-SCNC: 138 MMOL/L (ref 136–145)
WBC # BLD AUTO: 6.4 X10(3) UL (ref 4–11)

## 2023-02-14 PROCEDURE — 3074F SYST BP LT 130 MM HG: CPT | Performed by: INTERNAL MEDICINE

## 2023-02-14 PROCEDURE — 3078F DIAST BP <80 MM HG: CPT | Performed by: INTERNAL MEDICINE

## 2023-02-14 PROCEDURE — 74177 CT ABD & PELVIS W/CONTRAST: CPT | Performed by: INTERNAL MEDICINE

## 2023-02-14 PROCEDURE — 80048 BASIC METABOLIC PNL TOTAL CA: CPT | Performed by: INTERNAL MEDICINE

## 2023-02-14 PROCEDURE — 85025 COMPLETE CBC W/AUTO DIFF WBC: CPT | Performed by: INTERNAL MEDICINE

## 2023-02-14 PROCEDURE — 83690 ASSAY OF LIPASE: CPT | Performed by: INTERNAL MEDICINE

## 2023-02-14 PROCEDURE — 3008F BODY MASS INDEX DOCD: CPT | Performed by: INTERNAL MEDICINE

## 2023-02-14 PROCEDURE — 99214 OFFICE O/P EST MOD 30 MIN: CPT | Performed by: INTERNAL MEDICINE

## 2023-02-14 PROCEDURE — 36415 COLL VENOUS BLD VENIPUNCTURE: CPT | Performed by: INTERNAL MEDICINE

## 2023-02-14 RX ORDER — PAROXETINE HYDROCHLORIDE 20 MG/1
20 TABLET, FILM COATED ORAL NIGHTLY
Status: DISCONTINUED | OUTPATIENT
Start: 2023-02-14 | End: 2023-02-16

## 2023-02-14 RX ORDER — SODIUM CHLORIDE 9 MG/ML
INJECTION, SOLUTION INTRAVENOUS CONTINUOUS
Status: DISCONTINUED | OUTPATIENT
Start: 2023-02-14 | End: 2023-02-16

## 2023-02-14 RX ORDER — FOLIC ACID 1 MG/1
1 TABLET ORAL DAILY
Status: DISCONTINUED | OUTPATIENT
Start: 2023-02-14 | End: 2023-02-16

## 2023-02-14 RX ORDER — PANTOPRAZOLE SODIUM 40 MG/1
40 TABLET, DELAYED RELEASE ORAL
Status: DISCONTINUED | OUTPATIENT
Start: 2023-02-15 | End: 2023-02-16

## 2023-02-14 RX ORDER — MORPHINE SULFATE 2 MG/ML
2 INJECTION, SOLUTION INTRAMUSCULAR; INTRAVENOUS EVERY 6 HOURS PRN
Status: DISCONTINUED | OUTPATIENT
Start: 2023-02-14 | End: 2023-02-15

## 2023-02-14 RX ORDER — MORPHINE SULFATE 4 MG/ML
4 INJECTION, SOLUTION INTRAMUSCULAR; INTRAVENOUS ONCE
Status: COMPLETED | OUTPATIENT
Start: 2023-02-14 | End: 2023-02-14

## 2023-02-14 RX ORDER — GABAPENTIN 100 MG/1
100 CAPSULE ORAL 3 TIMES DAILY
Status: DISCONTINUED | OUTPATIENT
Start: 2023-02-14 | End: 2023-02-16

## 2023-02-14 RX ORDER — LAMOTRIGINE 25 MG/1
50 TABLET ORAL 2 TIMES DAILY
Status: DISCONTINUED | OUTPATIENT
Start: 2023-02-14 | End: 2023-02-16

## 2023-02-14 RX ORDER — HEPARIN SODIUM 5000 [USP'U]/ML
5000 INJECTION, SOLUTION INTRAVENOUS; SUBCUTANEOUS EVERY 8 HOURS SCHEDULED
Status: DISCONTINUED | OUTPATIENT
Start: 2023-02-14 | End: 2023-02-16

## 2023-02-14 NOTE — ED INITIAL ASSESSMENT (HPI)
The patient was sent Dr. Pepe Peña for acute pancreatitis with CT abd/pelvis results from today stating, \"mild pancreatic edema\". The patient reports abdominal pain for two days with nausea.

## 2023-02-14 NOTE — ED QUICK NOTES
Orders for admission, patient is aware of plan and ready to go upstairs. Any questions, please call ED RN Desire Isbell at extension [phone].      Patient Covid vaccination status: Fully vaccinated     COVID Test Ordered in ED: Rapid SARS-CoV-2 by PCR    COVID Suspicion at Admission: N/A    Running Infusions:      Mental Status/LOC at time of transport: A&Ox4, 20g lft ac     Other pertinent information:   CIWA score: N/A   NIH score:  N/A

## 2023-02-14 NOTE — TELEPHONE ENCOUNTER
Noted.  RN, please follow-up 2/15 ER f/u    Froy Palm MD  You 3 minutes ago (2:02 PM)     Spoke with patient about the lab and CT scan results was referred to ER due to acute pancreatitis with abdominal pain

## 2023-02-14 NOTE — TELEPHONE ENCOUNTER
Patient called, verified name/. States he is checked in to [address] ER now. Wants to speak with Dr Katie Messina. Please call him at [phone]. He is concerned they want to repeat workup. Advised him the ER staff can see his previous labs/Imaging.

## 2023-02-15 LAB
ALBUMIN SERPL-MCNC: 3.5 G/DL (ref 3.4–5)
ALBUMIN/GLOB SERPL: 0.9 {RATIO} (ref 1–2)
ALP LIVER SERPL-CCNC: 63 U/L
ALT SERPL-CCNC: 29 U/L
ANION GAP SERPL CALC-SCNC: 6 MMOL/L (ref 0–18)
AST SERPL-CCNC: 20 U/L (ref 15–37)
BASOPHILS # BLD AUTO: 0.04 X10(3) UL (ref 0–0.2)
BASOPHILS NFR BLD AUTO: 0.6 %
BILIRUB SERPL-MCNC: 0.6 MG/DL (ref 0.1–2)
BUN BLD-MCNC: 11 MG/DL (ref 7–18)
BUN/CREAT SERPL: 11.8 (ref 10–20)
CALCIUM BLD-MCNC: 8.9 MG/DL (ref 8.5–10.1)
CHLORIDE SERPL-SCNC: 106 MMOL/L (ref 98–112)
CO2 SERPL-SCNC: 30 MMOL/L (ref 21–32)
CREAT BLD-MCNC: 0.93 MG/DL
DEPRECATED RDW RBC AUTO: 36.7 FL (ref 35.1–46.3)
EOSINOPHIL # BLD AUTO: 0.25 X10(3) UL (ref 0–0.7)
EOSINOPHIL NFR BLD AUTO: 3.7 %
ERYTHROCYTE [DISTWIDTH] IN BLOOD BY AUTOMATED COUNT: 11.3 % (ref 11–15)
EST. AVERAGE GLUCOSE BLD GHB EST-MCNC: 163 MG/DL (ref 68–126)
GFR SERPLBLD BASED ON 1.73 SQ M-ARVRAT: 100 ML/MIN/1.73M2 (ref 60–?)
GLOBULIN PLAS-MCNC: 3.9 G/DL (ref 2.8–4.4)
GLUCOSE BLD-MCNC: 120 MG/DL (ref 70–99)
GLUCOSE BLDC GLUCOMTR-MCNC: 107 MG/DL (ref 70–99)
GLUCOSE BLDC GLUCOMTR-MCNC: 137 MG/DL (ref 70–99)
GLUCOSE BLDC GLUCOMTR-MCNC: 177 MG/DL (ref 70–99)
GLUCOSE BLDC GLUCOMTR-MCNC: 254 MG/DL (ref 70–99)
HBA1C MFR BLD: 7.3 % (ref ?–5.7)
HCT VFR BLD AUTO: 40.8 %
HGB BLD-MCNC: 14 G/DL
IMM GRANULOCYTES # BLD AUTO: 0.01 X10(3) UL (ref 0–1)
IMM GRANULOCYTES NFR BLD: 0.1 %
LIPASE SERPL-CCNC: 137 U/L (ref 13–75)
LIPASE SERPL-CCNC: 532 U/L (ref 73–393)
LYMPHOCYTES # BLD AUTO: 2.45 X10(3) UL (ref 1–4)
LYMPHOCYTES NFR BLD AUTO: 36.7 %
MCH RBC QN AUTO: 30.7 PG (ref 26–34)
MCHC RBC AUTO-ENTMCNC: 34.3 G/DL (ref 31–37)
MCV RBC AUTO: 89.5 FL
MONOCYTES # BLD AUTO: 0.67 X10(3) UL (ref 0.1–1)
MONOCYTES NFR BLD AUTO: 10 %
NEUTROPHILS # BLD AUTO: 3.25 X10 (3) UL (ref 1.5–7.7)
NEUTROPHILS # BLD AUTO: 3.25 X10(3) UL (ref 1.5–7.7)
NEUTROPHILS NFR BLD AUTO: 48.9 %
OSMOLALITY SERPL CALC.SUM OF ELEC: 295 MOSM/KG (ref 275–295)
PLATELET # BLD AUTO: 206 10(3)UL (ref 150–450)
POTASSIUM SERPL-SCNC: 4.2 MMOL/L (ref 3.5–5.1)
PROT SERPL-MCNC: 7.4 G/DL (ref 6.4–8.2)
RBC # BLD AUTO: 4.56 X10(6)UL
SODIUM SERPL-SCNC: 142 MMOL/L (ref 136–145)
WBC # BLD AUTO: 6.7 X10(3) UL (ref 4–11)

## 2023-02-15 PROCEDURE — 99222 1ST HOSP IP/OBS MODERATE 55: CPT | Performed by: INTERNAL MEDICINE

## 2023-02-15 PROCEDURE — 3051F HG A1C>EQUAL 7.0%<8.0%: CPT | Performed by: INTERNAL MEDICINE

## 2023-02-15 RX ORDER — MORPHINE SULFATE 2 MG/ML
4 INJECTION, SOLUTION INTRAMUSCULAR; INTRAVENOUS EVERY 4 HOURS PRN
Status: DISCONTINUED | OUTPATIENT
Start: 2023-02-15 | End: 2023-02-16

## 2023-02-15 RX ORDER — NICOTINE POLACRILEX 4 MG
15 LOZENGE BUCCAL
Status: DISCONTINUED | OUTPATIENT
Start: 2023-02-15 | End: 2023-02-16

## 2023-02-15 RX ORDER — DEXTROSE MONOHYDRATE 25 G/50ML
50 INJECTION, SOLUTION INTRAVENOUS
Status: DISCONTINUED | OUTPATIENT
Start: 2023-02-15 | End: 2023-02-16

## 2023-02-15 RX ORDER — ACETAMINOPHEN 325 MG/1
650 TABLET ORAL EVERY 6 HOURS PRN
Status: DISCONTINUED | OUTPATIENT
Start: 2023-02-15 | End: 2023-02-16

## 2023-02-15 RX ORDER — NICOTINE POLACRILEX 4 MG
30 LOZENGE BUCCAL
Status: DISCONTINUED | OUTPATIENT
Start: 2023-02-15 | End: 2023-02-16

## 2023-02-15 RX ORDER — ONDANSETRON 2 MG/ML
4 INJECTION INTRAMUSCULAR; INTRAVENOUS EVERY 6 HOURS PRN
Status: DISCONTINUED | OUTPATIENT
Start: 2023-02-15 | End: 2023-02-16

## 2023-02-15 RX ORDER — MORPHINE SULFATE 2 MG/ML
4 INJECTION, SOLUTION INTRAMUSCULAR; INTRAVENOUS EVERY 4 HOURS PRN
Status: DISCONTINUED | OUTPATIENT
Start: 2023-02-15 | End: 2023-02-15

## 2023-02-15 RX ORDER — MAGNESIUM OXIDE 400 MG (241.3 MG MAGNESIUM) TABLET
1 TABLET NIGHTLY
Status: DISCONTINUED | OUTPATIENT
Start: 2023-02-15 | End: 2023-02-16

## 2023-02-15 RX ORDER — MORPHINE SULFATE 2 MG/ML
2 INJECTION, SOLUTION INTRAMUSCULAR; INTRAVENOUS EVERY 4 HOURS PRN
Status: DISCONTINUED | OUTPATIENT
Start: 2023-02-15 | End: 2023-02-16

## 2023-02-15 NOTE — PLAN OF CARE
Problem: Diabetes/Glucose Control  Goal: Glucose maintained within prescribed range  Description: INTERVENTIONS:  - Monitor Blood Glucose as ordered  - Assess for signs and symptoms of hyperglycemia and hypoglycemia  - Administer ordered medications to maintain glucose within target range  - Assess barriers to adequate nutritional intake and initiate nutrition consult as needed  - Instruct patient on self management of diabetes  Outcome: Progressing     Problem: GASTROINTESTINAL - ADULT  Goal: Minimal or absence of nausea and vomiting  Description: INTERVENTIONS:  - Maintain adequate hydration with IV or PO as ordered and tolerated  - Nasogastric tube to low intermittent suction as ordered  - Evaluate effectiveness of ordered antiemetic medications  - Provide nonpharmacologic comfort measures as appropriate  - Advance diet as tolerated, if ordered  - Obtain nutritional consult as needed  - Evaluate fluid balance  Outcome: Progressing  Goal: Maintains or returns to baseline bowel function  Description: INTERVENTIONS:  - Assess bowel function  - Maintain adequate hydration with IV or PO as ordered and tolerated  - Evaluate effectiveness of GI medications  - Encourage mobilization and activity  - Obtain nutritional consult as needed  - Establish a toileting routine/schedule  - Consider collaborating with pharmacy to review patient's medication profile  Outcome: Progressing  Goal: Maintains adequate nutritional intake (undernourished)  Description: INTERVENTIONS:  - Monitor percentage of each meal consumed  - Identify factors contributing to decreased intake, treat as appropriate  - Assist with meals as needed  - Monitor I&O, WT and lab values  - Obtain nutritional consult as needed  - Optimize oral hygiene and moisture  - Encourage food from home; allow for food preferences  - Enhance eating environment  Outcome: Progressing  Goal: Achieves appropriate nutritional intake (bariatric)  Description: INTERVENTIONS:  - Monitor for over-consumption  - Identify factors contributing to increased intake, treat as appropriate  - Monitor I&O, WT and lab values  - Obtain nutritional consult as needed  - Evaluate psychosocial factors contributing to over-consumption  Outcome: Progressing     Problem: METABOLIC/FLUID AND ELECTROLYTES - ADULT  Goal: Glucose maintained within prescribed range  Description: INTERVENTIONS:  - Monitor Blood Glucose as ordered  - Assess for signs and symptoms of hyperglycemia and hypoglycemia  - Administer ordered medications to maintain glucose within target range  - Assess barriers to adequate nutritional intake and initiate nutrition consult as needed  - Instruct patient on self management of diabetes  Outcome: Progressing  Goal: Electrolytes maintained within normal limits  Description: INTERVENTIONS:  - Monitor labs and rhythm and assess patient for signs and symptoms of electrolyte imbalances  - Administer electrolyte replacement as ordered  - Monitor response to electrolyte replacements, including rhythm and repeat lab results as appropriate  - Fluid restriction as ordered  - Instruct patient on fluid and nutrition restrictions as appropriate  Outcome: Progressing  Goal: Hemodynamic stability and optimal renal function maintained  Description: INTERVENTIONS:  - Monitor labs and assess for signs and symptoms of volume excess or deficit  - Monitor intake, output and patient weight  - Monitor urine specific gravity, serum osmolarity and serum sodium as indicated or ordered  - Monitor response to interventions for patient's volume status, including labs, urine output, blood pressure (other measures as available)  - Encourage oral intake as appropriate  - Instruct patient on fluid and nutrition restrictions as appropriate  Outcome: Progressing     Problem: PAIN - ADULT  Goal: Verbalizes/displays adequate comfort level or patient's stated pain goal  Description: INTERVENTIONS:  - Encourage pt to monitor pain and request assistance  - Assess pain using appropriate pain scale  - Administer analgesics based on type and severity of pain and evaluate response  - Implement non-pharmacological measures as appropriate and evaluate response  - Consider cultural and social influences on pain and pain management  - Manage/alleviate anxiety  - Utilize distraction and/or relaxation techniques  - Monitor for opioid side effects  - Notify MD/LIP if interventions unsuccessful or patient reports new pain  - Anticipate increased pain with activity and pre-medicate as appropriate  Outcome: Progressing     Problem: SAFETY ADULT - FALL  Goal: Free from fall injury  Description: INTERVENTIONS:  - Assess pt frequently for physical needs  - Identify cognitive and physical deficits and behaviors that affect risk of falls. - [place] fall precautions as indicated by assessment.  - Educate pt/family on patient safety including physical limitations  - Instruct pt to call for assistance with activity based on assessment  - Modify environment to reduce risk of injury  - Provide assistive devices as appropriate  - Consider OT/PT consult to assist with strengthening/mobility  - Encourage toileting schedule  Outcome: Progressing   Patient received from ED a/ox4 with complaint of epigastric and mid back pain for about three days. Has history of recurrent pancreatitis. Denies nausea/vomiting.  Plan of care reviewed, precautions in place, vital signs stable

## 2023-02-16 ENCOUNTER — TELEPHONE (OUTPATIENT)
Dept: INTERNAL MEDICINE CLINIC | Facility: CLINIC | Age: 53
End: 2023-02-16

## 2023-02-16 VITALS
RESPIRATION RATE: 16 BRPM | WEIGHT: 197.13 LBS | TEMPERATURE: 98 F | OXYGEN SATURATION: 98 % | SYSTOLIC BLOOD PRESSURE: 124 MMHG | DIASTOLIC BLOOD PRESSURE: 84 MMHG | HEIGHT: 71 IN | HEART RATE: 66 BPM | BODY MASS INDEX: 27.6 KG/M2

## 2023-02-16 LAB
GLUCOSE BLDC GLUCOMTR-MCNC: 151 MG/DL (ref 70–99)
GLUCOSE BLDC GLUCOMTR-MCNC: 179 MG/DL (ref 70–99)

## 2023-02-16 PROCEDURE — 99239 HOSP IP/OBS DSCHRG MGMT >30: CPT | Performed by: INTERNAL MEDICINE

## 2023-02-16 NOTE — PLAN OF CARE
Problem: Patient Centered Care  Goal: Patient preferences are identified and integrated in the patient's plan of care  Description: Interventions:  - What would you like us to know as we care for you?  From home with girlfriend  - Provide timely, complete, and accurate information to patient/family  - Incorporate patient and family knowledge, values, beliefs, and cultural backgrounds into the planning and delivery of care  - Encourage patient/family to participate in care and decision-making at the level they choose  - Honor patient and family perspectives and choices  Outcome: Progressing     Problem: Diabetes/Glucose Control  Goal: Glucose maintained within prescribed range  Description: INTERVENTIONS:  - Monitor Blood Glucose as ordered  - Assess for signs and symptoms of hyperglycemia and hypoglycemia  - Administer ordered medications to maintain glucose within target range  - Assess barriers to adequate nutritional intake and initiate nutrition consult as needed  - Instruct patient on self management of diabetes  Outcome: Progressing     Problem: GASTROINTESTINAL - ADULT  Goal: Minimal or absence of nausea and vomiting  Description: INTERVENTIONS:  - Maintain adequate hydration with IV or PO as ordered and tolerated  - Nasogastric tube to low intermittent suction as ordered  - Evaluate effectiveness of ordered antiemetic medications  - Provide nonpharmacologic comfort measures as appropriate  - Advance diet as tolerated, if ordered  - Obtain nutritional consult as needed  - Evaluate fluid balance  Outcome: Progressing  Goal: Maintains or returns to baseline bowel function  Description: INTERVENTIONS:  - Assess bowel function  - Maintain adequate hydration with IV or PO as ordered and tolerated  - Evaluate effectiveness of GI medications  - Encourage mobilization and activity  - Obtain nutritional consult as needed  - Establish a toileting routine/schedule  - Consider collaborating with pharmacy to review patient's medication profile  Outcome: Progressing  Goal: Maintains adequate nutritional intake (undernourished)  Description: INTERVENTIONS:  - Monitor percentage of each meal consumed  - Identify factors contributing to decreased intake, treat as appropriate  - Assist with meals as needed  - Monitor I&O, WT and lab values  - Obtain nutritional consult as needed  - Optimize oral hygiene and moisture  - Encourage food from home; allow for food preferences  - Enhance eating environment  Outcome: Progressing  Goal: Achieves appropriate nutritional intake (bariatric)  Description: INTERVENTIONS:  - Monitor for over-consumption  - Identify factors contributing to increased intake, treat as appropriate  - Monitor I&O, WT and lab values  - Obtain nutritional consult as needed  - Evaluate psychosocial factors contributing to over-consumption  Outcome: Progressing     Problem: METABOLIC/FLUID AND ELECTROLYTES - ADULT  Goal: Glucose maintained within prescribed range  Description: INTERVENTIONS:  - Monitor Blood Glucose as ordered  - Assess for signs and symptoms of hyperglycemia and hypoglycemia  - Administer ordered medications to maintain glucose within target range  - Assess barriers to adequate nutritional intake and initiate nutrition consult as needed  - Instruct patient on self management of diabetes  Outcome: Progressing  Goal: Electrolytes maintained within normal limits  Description: INTERVENTIONS:  - Monitor labs and rhythm and assess patient for signs and symptoms of electrolyte imbalances  - Administer electrolyte replacement as ordered  - Monitor response to electrolyte replacements, including rhythm and repeat lab results as appropriate  - Fluid restriction as ordered  - Instruct patient on fluid and nutrition restrictions as appropriate  Outcome: Progressing  Goal: Hemodynamic stability and optimal renal function maintained  Description: INTERVENTIONS:  - Monitor labs and assess for signs and symptoms of volume excess or deficit  - Monitor intake, output and patient weight  - Monitor urine specific gravity, serum osmolarity and serum sodium as indicated or ordered  - Monitor response to interventions for patient's volume status, including labs, urine output, blood pressure (other measures as available)  - Encourage oral intake as appropriate  - Instruct patient on fluid and nutrition restrictions as appropriate  Outcome: Progressing     Problem: PAIN - ADULT  Goal: Verbalizes/displays adequate comfort level or patient's stated pain goal  Description: INTERVENTIONS:  - Encourage pt to monitor pain and request assistance  - Assess pain using appropriate pain scale  - Administer analgesics based on type and severity of pain and evaluate response  - Implement non-pharmacological measures as appropriate and evaluate response  - Consider cultural and social influences on pain and pain management  - Manage/alleviate anxiety  - Utilize distraction and/or relaxation techniques  - Monitor for opioid side effects  - Notify MD/LIP if interventions unsuccessful or patient reports new pain  - Anticipate increased pain with activity and pre-medicate as appropriate  Outcome: Progressing     Problem: RISK FOR INFECTION - ADULT  Goal: Absence of fever/infection during anticipated neutropenic period  Description: INTERVENTIONS  - Monitor WBC  - Administer growth factors as ordered  - Implement neutropenic guidelines  Outcome: Progressing     Problem: SAFETY ADULT - FALL  Goal: Free from fall injury  Description: INTERVENTIONS:  - Assess pt frequently for physical needs  - Identify cognitive and physical deficits and behaviors that affect risk of falls.   - [place] fall precautions as indicated by assessment.  - Educate pt/family on patient safety including physical limitations  - Instruct pt to call for assistance with activity based on assessment  - Modify environment to reduce risk of injury  - Provide assistive devices as appropriate  - Consider OT/PT consult to assist with strengthening/mobility  - Encourage toileting schedule  Outcome: Progressing   Patient a/ox4, in minimal distress, verbalizes epigastric pain is minor, denies nausea, has a good appetite.  Vital signs stable, precautions in place, plan of care reviewed

## 2023-02-16 NOTE — PLAN OF CARE
Patient prepared for discharge, PIV removed. Education information and discharge packet provided. Vital signs stable. Escorted patient to lobby for discharge to private vehicle. Problem: Patient Centered Care  Goal: Patient preferences are identified and integrated in the patient's plan of care  Description: Interventions:  - What would you like us to know as we care for you? From home with girlfriend.   - Provide timely, complete, and accurate information to patient/family  - Incorporate patient and family knowledge, values, beliefs, and cultural backgrounds into the planning and delivery of care  - Encourage patient/family to participate in care and decision-making at the level they choose  - Honor patient and family perspectives and choices  Outcome: Completed     Problem: Diabetes/Glucose Control  Goal: Glucose maintained within prescribed range  Description: INTERVENTIONS:  - Monitor Blood Glucose as ordered  - Assess for signs and symptoms of hyperglycemia and hypoglycemia  - Administer ordered medications to maintain glucose within target range  - Assess barriers to adequate nutritional intake and initiate nutrition consult as needed  - Instruct patient on self management of diabetes  Outcome: Completed     Problem: Patient/Family Goals  Goal: Patient/Family Long Term Goal  Description: Patient's Long Term Goal:     Interventions:  -   - See additional Care Plan goals for specific interventions  Outcome: Completed  Goal: Patient/Family Short Term Goal  Description: Patient's Short Term Goal: patient to remain free from acute abdominal pain    Interventions:   - follow low-fat diet  - See additional Care Plan goals for specific interventions  Outcome: Completed     Problem: GASTROINTESTINAL - ADULT  Goal: Minimal or absence of nausea and vomiting  Description: INTERVENTIONS:  - Maintain adequate hydration with IV or PO as ordered and tolerated  - Nasogastric tube to low intermittent suction as ordered  - Evaluate effectiveness of ordered antiemetic medications  - Provide nonpharmacologic comfort measures as appropriate  - Advance diet as tolerated, if ordered  - Obtain nutritional consult as needed  - Evaluate fluid balance  Outcome: Completed  Goal: Maintains or returns to baseline bowel function  Description: INTERVENTIONS:  - Assess bowel function  - Maintain adequate hydration with IV or PO as ordered and tolerated  - Evaluate effectiveness of GI medications  - Encourage mobilization and activity  - Obtain nutritional consult as needed  - Establish a toileting routine/schedule  - Consider collaborating with pharmacy to review patient's medication profile  Outcome: Completed  Goal: Maintains adequate nutritional intake (undernourished)  Description: INTERVENTIONS:  - Monitor percentage of each meal consumed  - Identify factors contributing to decreased intake, treat as appropriate  - Assist with meals as needed  - Monitor I&O, WT and lab values  - Obtain nutritional consult as needed  - Optimize oral hygiene and moisture  - Encourage food from home; allow for food preferences  - Enhance eating environment  Outcome: Completed  Goal: Achieves appropriate nutritional intake (bariatric)  Description: INTERVENTIONS:  - Monitor for over-consumption  - Identify factors contributing to increased intake, treat as appropriate  - Monitor I&O, WT and lab values  - Obtain nutritional consult as needed  - Evaluate psychosocial factors contributing to over-consumption  Outcome: Completed     Problem: METABOLIC/FLUID AND ELECTROLYTES - ADULT  Goal: Glucose maintained within prescribed range  Description: INTERVENTIONS:  - Monitor Blood Glucose as ordered  - Assess for signs and symptoms of hyperglycemia and hypoglycemia  - Administer ordered medications to maintain glucose within target range  - Assess barriers to adequate nutritional intake and initiate nutrition consult as needed  - Instruct patient on self management of diabetes  Outcome: Completed  Goal: Electrolytes maintained within normal limits  Description: INTERVENTIONS:  - Monitor labs and rhythm and assess patient for signs and symptoms of electrolyte imbalances  - Administer electrolyte replacement as ordered  - Monitor response to electrolyte replacements, including rhythm and repeat lab results as appropriate  - Fluid restriction as ordered  - Instruct patient on fluid and nutrition restrictions as appropriate  Outcome: Completed  Goal: Hemodynamic stability and optimal renal function maintained  Description: INTERVENTIONS:  - Monitor labs and assess for signs and symptoms of volume excess or deficit  - Monitor intake, output and patient weight  - Monitor urine specific gravity, serum osmolarity and serum sodium as indicated or ordered  - Monitor response to interventions for patient's volume status, including labs, urine output, blood pressure (other measures as available)  - Encourage oral intake as appropriate  - Instruct patient on fluid and nutrition restrictions as appropriate  Outcome: Completed     Problem: PAIN - ADULT  Goal: Verbalizes/displays adequate comfort level or patient's stated pain goal  Description: INTERVENTIONS:  - Encourage pt to monitor pain and request assistance  - Assess pain using appropriate pain scale  - Administer analgesics based on type and severity of pain and evaluate response  - Implement non-pharmacological measures as appropriate and evaluate response  - Consider cultural and social influences on pain and pain management  - Manage/alleviate anxiety  - Utilize distraction and/or relaxation techniques  - Monitor for opioid side effects  - Notify MD/LIP if interventions unsuccessful or patient reports new pain  - Anticipate increased pain with activity and pre-medicate as appropriate  Outcome: Completed     Problem: RISK FOR INFECTION - ADULT  Goal: Absence of fever/infection during anticipated neutropenic period  Description: INTERVENTIONS  - Monitor WBC  - Administer growth factors as ordered  - Implement neutropenic guidelines  Outcome: Completed     Problem: SAFETY ADULT - FALL  Goal: Free from fall injury  Description: INTERVENTIONS:  - Assess pt frequently for physical needs  - Identify cognitive and physical deficits and behaviors that affect risk of falls.   - [place] fall precautions as indicated by assessment.  - Educate pt/family on patient safety including physical limitations  - Instruct pt to call for assistance with activity based on assessment  - Modify environment to reduce risk of injury  - Provide assistive devices as appropriate  - Consider OT/PT consult to assist with strengthening/mobility  - Encourage toileting schedule  Outcome: Completed

## 2023-02-16 NOTE — PLAN OF CARE
Patient is alert and oriented x4. On room air, denies SOB, vitals stable. Educated patient on plan of care, and general education. Call light within reach. Bed in lowest position. Fall precautions in place. All needs addressed. Hourly rounding maintained. Tylenol given as requested for headache. Report given to night shift RN. Pt stable at change of shift. Problem: Patient Centered Care  Goal: Patient preferences are identified and integrated in the patient's plan of care  Description: Interventions:  - What would you like us to know as we care for you? From home with girlfriend  - Provide timely, complete, and accurate information to patient/family  - Incorporate patient and family knowledge, values, beliefs, and cultural backgrounds into the planning and delivery of care  - Encourage patient/family to participate in care and decision-making at the level they choose  - Honor patient and family perspectives and choices  Outcome: Progressing     Problem: GASTROINTESTINAL - ADULT  Goal: Minimal or absence of nausea and vomiting  Description: INTERVENTIONS:  - Maintain adequate hydration with IV or PO as ordered and tolerated  - Nasogastric tube to low intermittent suction as ordered  - Evaluate effectiveness of ordered antiemetic medications  - Provide nonpharmacologic comfort measures as appropriate  - Advance diet as tolerated, if ordered  - Obtain nutritional consult as needed  - Evaluate fluid balance  Outcome: Progressing     Problem: SAFETY ADULT - FALL  Goal: Free from fall injury  Description: INTERVENTIONS:  - Assess pt frequently for physical needs  - Identify cognitive and physical deficits and behaviors that affect risk of falls.   - [place] fall precautions as indicated by assessment.  - Educate pt/family on patient safety including physical limitations  - Instruct pt to call for assistance with activity based on assessment  - Modify environment to reduce risk of injury  - Provide assistive devices as appropriate  - Consider OT/PT consult to assist with strengthening/mobility  - Encourage toileting schedule  Outcome: Progressing

## 2023-02-17 ENCOUNTER — TELEMEDICINE (OUTPATIENT)
Dept: INTERNAL MEDICINE CLINIC | Facility: CLINIC | Age: 53
End: 2023-02-17

## 2023-02-17 ENCOUNTER — PATIENT OUTREACH (OUTPATIENT)
Dept: CASE MANAGEMENT | Age: 53
End: 2023-02-17

## 2023-02-17 ENCOUNTER — TELEPHONE (OUTPATIENT)
Dept: INTERNAL MEDICINE CLINIC | Facility: CLINIC | Age: 53
End: 2023-02-17

## 2023-02-17 DIAGNOSIS — K85.90 ACUTE PANCREATITIS, UNSPECIFIED COMPLICATION STATUS, UNSPECIFIED PANCREATITIS TYPE: Primary | ICD-10-CM

## 2023-02-17 DIAGNOSIS — Z02.9 ENCOUNTERS FOR UNSPECIFIED ADMINISTRATIVE PURPOSE: ICD-10-CM

## 2023-02-17 DIAGNOSIS — R10.13 EPIGASTRIC PAIN: ICD-10-CM

## 2023-02-17 PROCEDURE — 99214 OFFICE O/P EST MOD 30 MIN: CPT | Performed by: INTERNAL MEDICINE

## 2023-02-17 RX ORDER — HYDROCODONE BITARTRATE AND ACETAMINOPHEN 5; 325 MG/1; MG/1
1 TABLET ORAL 2 TIMES DAILY PRN
Qty: 15 TABLET | Refills: 0 | Status: SHIPPED | OUTPATIENT
Start: 2023-02-17

## 2023-02-17 RX ORDER — HYDROCODONE BITARTRATE AND ACETAMINOPHEN 7.5; 325 MG/1; MG/1
1 TABLET ORAL 2 TIMES DAILY PRN
Qty: 15 TABLET | Refills: 0 | Status: SHIPPED | OUTPATIENT
Start: 2023-02-17

## 2023-02-17 NOTE — TELEPHONE ENCOUNTER
Spoke to patient and relayed Dr. Foss Sayer message below. Patient verbalized understanding and had no further questions.

## 2023-02-17 NOTE — TELEPHONE ENCOUNTER
[facility] advising they don't have 5/325 mg Norco and neither [facility] the [facility] [place]. He tells me they are advising him to have his provider call in the 7.5 mg dose as they have that. I called the [facility] [place] to ask about the prescription status, and spoke to Ender. She did advise that the [facility] are all on back order.  They are ok to fill the 7. 5 /325 mg dose though    Please advise

## 2023-02-17 NOTE — TELEPHONE ENCOUNTER
NCM spoke to the patient today for TCM. The patient was recently hospitalized for pancreatitis. The patient is scheduled for a TCM-HFU appointment on 2/27/2023. The patient is complaining of continued upper back/shoulder pain (rated 7-8/10) since discharge. The patient spoke with Dr. Darrin Louise yesterday and was prescribed the following Rx:    Acetaminophen-Codeine (TYLENOL WITH CODEINE #3) 300-30 MG Oral Tab 20 tablet 0 2/16/2023     Sig - Route: Take 1 tablet by mouth 3 (three) times daily as needed for Pain. - Oral        The patient reported pharmacist warned of possible interaction with the new Rx and Paroxetine with increased codeine levels and serotonin syndrome. The patient also reported the Rx provided little to no relief for the patient's pain. The patient is requesting an alternative Rx for pain management. The patient is requesting to use the following pharmacy for the prescription:     Pharmacy    [facility]/PHARMACY [address] [place] [address], [phone], [phone]       TRIAGE: Please provide Dr. Darrin Louise with this condition up and provide the patient with further pain management recommendations.

## 2023-02-17 NOTE — TELEPHONE ENCOUNTER
I spoke with pt, pain med sent due to continued abd pain, told if any worsening symptoms to go back to ER

## 2023-02-17 NOTE — TELEPHONE ENCOUNTER
If pain continue and no relief with the med given then he should go to ER, in the mean time go back to clear liquid diet and advance diet as tolerated but if not better or worst then go to ER

## 2023-02-17 NOTE — TELEPHONE ENCOUNTER
Advised patient of Dr Andrew Reyes note. Patient is requesting to speak to Dr. Yanira Montoya directly. Virtual visit made.      Future Appointments   Date Time Provider Cheikh Sharron   2/17/2023  2:15 PM Araceli Rogers MD [id number]3] [place] [address]   2/27/2023 10:30 AM Araceli Rogers MD [facility]   3/13/2023  9:30 AM Elyse Cannon, DO PM[facility]R JEREMY [address]

## 2023-02-17 NOTE — TELEPHONE ENCOUNTER
Message # [id number]         2023 06:27p   [ISABELD]  To:  From:  MUSA Leonard MD:  Phone#:  ----------------------------------------------------------------------  RE PT Verena Hilton, SOHAN [birth year], DISCHARGED FROM [address] [place] [address], [phone] Paged at number :  PAGE: [phone] at :   18:27          (Message Delivered)   LIBBY E L I V E DAIJA I E S :  2023 06:27p           ISABELD       Delivered

## 2023-02-27 ENCOUNTER — OFFICE VISIT (OUTPATIENT)
Dept: INTERNAL MEDICINE CLINIC | Facility: CLINIC | Age: 53
End: 2023-02-27

## 2023-02-27 VITALS
RESPIRATION RATE: 17 BRPM | BODY MASS INDEX: 29.12 KG/M2 | HEART RATE: 71 BPM | WEIGHT: 208 LBS | TEMPERATURE: 98 F | SYSTOLIC BLOOD PRESSURE: 138 MMHG | DIASTOLIC BLOOD PRESSURE: 84 MMHG | HEIGHT: 71 IN | OXYGEN SATURATION: 100 %

## 2023-02-27 DIAGNOSIS — Z09 HOSPITAL DISCHARGE FOLLOW-UP: Primary | ICD-10-CM

## 2023-02-27 DIAGNOSIS — E78.5 HYPERLIPIDEMIA, UNSPECIFIED HYPERLIPIDEMIA TYPE: ICD-10-CM

## 2023-02-27 DIAGNOSIS — Z12.5 PROSTATE CANCER SCREENING: ICD-10-CM

## 2023-02-27 DIAGNOSIS — E11.9 TYPE 2 DIABETES MELLITUS WITHOUT COMPLICATION, WITHOUT LONG-TERM CURRENT USE OF INSULIN (HCC): ICD-10-CM

## 2023-02-27 DIAGNOSIS — K86.1 CHRONIC RECURRENT PANCREATITIS (HCC): ICD-10-CM

## 2023-02-27 LAB
C-REACTIVE PROTEIN: 2.9 MG/L
CYCLIC CITRULLINATED$PEPTIDE (CCP) AB (IGG): <16 UNITS
HLA-B27 ANTIGEN: NEGATIVE
RHEUMATOID FACTOR: <14 IU/ML
SED RATE BY MODIFIED$WESTERGREN: 11 MM/H

## 2023-02-27 PROCEDURE — 3075F SYST BP GE 130 - 139MM HG: CPT | Performed by: INTERNAL MEDICINE

## 2023-02-27 PROCEDURE — 3079F DIAST BP 80-89 MM HG: CPT | Performed by: INTERNAL MEDICINE

## 2023-02-27 PROCEDURE — 3008F BODY MASS INDEX DOCD: CPT | Performed by: INTERNAL MEDICINE

## 2023-02-27 PROCEDURE — 99495 TRANSJ CARE MGMT MOD F2F 14D: CPT | Performed by: INTERNAL MEDICINE

## 2023-03-03 PROCEDURE — 3061F NEG MICROALBUMINURIA REV: CPT | Performed by: INTERNAL MEDICINE

## 2023-03-04 LAB
CHOL/HDLC RATIO: 3.9 (CALC)
CHOLESTEROL, TOTAL: 138 MG/DL
CREATININE, RANDOM URINE: 97 MG/DL (ref 20–320)
HDL CHOLESTEROL: 35 MG/DL
LDL-CHOLESTEROL: 82 MG/DL (CALC)
MICROALBUMIN/CREATININE RATIO, RANDOM URINE: 3 MCG/MG CREAT
MICROALBUMIN: 0.3 MG/DL
NON-HDL CHOLESTEROL: 103 MG/DL (CALC)
PSA, TOTAL: 0.28 NG/ML
TRIGLYCERIDES: 112 MG/DL

## 2023-03-13 ENCOUNTER — TELEPHONE (OUTPATIENT)
Dept: RHEUMATOLOGY | Facility: CLINIC | Age: 53
End: 2023-03-13

## 2023-03-13 ENCOUNTER — PROCEDURE VISIT (OUTPATIENT)
Dept: PHYSICAL MEDICINE AND REHAB | Facility: CLINIC | Age: 53
End: 2023-03-13
Payer: COMMERCIAL

## 2023-03-13 DIAGNOSIS — R20.0 NUMBNESS AND TINGLING OF HAND: ICD-10-CM

## 2023-03-13 DIAGNOSIS — R20.2 NUMBNESS AND TINGLING OF HAND: ICD-10-CM

## 2023-03-14 NOTE — TELEPHONE ENCOUNTER
Spoke with henry and he verbalizes understanding. He has appt scheduled with Dr. Attila Shay 3/22/23 and would like cortisone injections then.

## 2023-03-22 ENCOUNTER — OFFICE VISIT (OUTPATIENT)
Dept: RHEUMATOLOGY | Facility: CLINIC | Age: 53
End: 2023-03-22

## 2023-03-22 VITALS
DIASTOLIC BLOOD PRESSURE: 87 MMHG | BODY MASS INDEX: 28.7 KG/M2 | SYSTOLIC BLOOD PRESSURE: 143 MMHG | HEART RATE: 76 BPM | HEIGHT: 71 IN | WEIGHT: 205 LBS

## 2023-03-22 DIAGNOSIS — G56.03 BILATERAL CARPAL TUNNEL SYNDROME: Primary | ICD-10-CM

## 2023-03-22 PROCEDURE — 3008F BODY MASS INDEX DOCD: CPT | Performed by: INTERNAL MEDICINE

## 2023-03-22 PROCEDURE — 3079F DIAST BP 80-89 MM HG: CPT | Performed by: INTERNAL MEDICINE

## 2023-03-22 PROCEDURE — 99213 OFFICE O/P EST LOW 20 MIN: CPT | Performed by: INTERNAL MEDICINE

## 2023-03-22 PROCEDURE — 20526 THER INJECTION CARP TUNNEL: CPT | Performed by: INTERNAL MEDICINE

## 2023-03-22 PROCEDURE — 3077F SYST BP >= 140 MM HG: CPT | Performed by: INTERNAL MEDICINE

## 2023-03-22 RX ORDER — METHYLPREDNISOLONE ACETATE 80 MG/ML
40 INJECTION, SUSPENSION INTRA-ARTICULAR; INTRALESIONAL; INTRAMUSCULAR; SOFT TISSUE ONCE
Status: COMPLETED | OUTPATIENT
Start: 2023-03-22 | End: 2023-03-22

## 2023-03-22 RX ADMIN — METHYLPREDNISOLONE ACETATE 40 MG: 80 INJECTION, SUSPENSION INTRA-ARTICULAR; INTRALESIONAL; INTRAMUSCULAR; SOFT TISSUE at 08:40:00

## 2023-03-22 NOTE — PATIENT INSTRUCTIONS
You are seen for bilateral wrist pain and numbness due to carpal tunnel  We injected both wrist with cortisone, hopefully it helps  Recommend to continue to wear the wrist splints at night  If you continue to have symptoms I would then refer you to a hand surgeon

## 2023-03-22 NOTE — PROCEDURES
With  consent, I injected the patient's  B/L carpal tunnel with 0.5ml lidocaine  1% and 0.5ml Depomedrol 80. It was under sterile technique using iodine and alcohol swabs and ethyl chloride was used as an anaesthetic spray. Pt.  tolerated it well.

## 2023-03-24 ENCOUNTER — TELEPHONE (OUTPATIENT)
Dept: DERMATOLOGY CLINIC | Facility: CLINIC | Age: 53
End: 2023-03-24

## 2023-03-24 NOTE — TELEPHONE ENCOUNTER
Garrick Dowell 88 called from [employer]. Pt's appeal will  in 30 days for bridge program coverage. They need a new appeal letter faxed to both [facility] at [phone] and to the [facility] at [phone]. I did not see a previous appeal letter generated in [place].  No information was faxed to office regarding need for new appeal.

## 2023-05-02 ENCOUNTER — OFFICE VISIT (OUTPATIENT)
Dept: DERMATOLOGY CLINIC | Facility: CLINIC | Age: 53
End: 2023-05-02

## 2023-05-02 DIAGNOSIS — L40.9 PSORIASIS: Primary | ICD-10-CM

## 2023-05-02 PROCEDURE — 99214 OFFICE O/P EST MOD 30 MIN: CPT | Performed by: STUDENT IN AN ORGANIZED HEALTH CARE EDUCATION/TRAINING PROGRAM

## 2023-05-02 RX ORDER — CLOBETASOL PROPIONATE 0.5 MG/G
OINTMENT TOPICAL
Qty: 45 G | Refills: 2 | Status: SHIPPED | OUTPATIENT
Start: 2023-05-02

## 2023-05-02 RX ORDER — SECUKINUMAB 150 MG/ML
INJECTION SUBCUTANEOUS
COMMUNITY
Start: 2023-04-27

## 2023-05-18 RX ORDER — FOLIC ACID 1 MG/1
TABLET ORAL
Qty: 90 TABLET | Refills: 3 | Status: SHIPPED | OUTPATIENT
Start: 2023-05-18

## 2023-05-18 RX ORDER — OMEPRAZOLE 40 MG/1
CAPSULE, DELAYED RELEASE ORAL
Qty: 90 CAPSULE | Refills: 3 | Status: SHIPPED | OUTPATIENT
Start: 2023-05-18

## 2023-05-18 NOTE — TELEPHONE ENCOUNTER
No protocol for folic acid. Please advise on refill request.    Refill passed per [place] [employer] [place] protocol.    Requested Prescriptions   Pending Prescriptions Disp Refills    OMEPRAZOLE 40 MG Oral Capsule Delayed Release [Pharmacy Med Name: Omeprazole 40 MG Oral Capsule Delayed Release] 90 capsule 3     Sig: TAKE 1 CAPSULE BY MOUTH DAILY       Gastrointestional Medication Protocol Passed - 5/17/2023  3:13 PM        Passed - In person appointment or virtual visit in the past 12 mos or appointment in next 3 mos     Recent Outpatient Visits              2 weeks ago Psoriasis    Alexis Payan MD    Office Visit    1 month ago Bilateral carpal tunnel syndrome    [facility], [address], Mellisa Carlson MD    Office Visit    2 months ago Hospital discharge follow-up    Bertrand Payan MD    Office Visit    3 months ago Acute pancreatitis, unspecified complication status, unspecified pancreatitis type    [facility], [address], [place], Jack Melendez MD    Telemedicine    3 months ago Epigastric pain    [facility], [address], Saleem Daniel MD    Office Visit                        METFORMIN HCL 1000 MG Oral Tab [place] Med Name: metFORMIN HCl 1000 MG Oral Tablet] 180 tablet 3     Sig: TAKE 1 TABLET BY MOUTH TWICE  DAILY WITH MEALS       Diabetes Medication Protocol Passed - 5/17/2023  3:13 PM        Passed - Last A1C < 7.5 and within past 6 months     Lab Results   Component Value Date    A1C 7.3 (H) 02/15/2023               Passed - In person appointment or virtual visit in the past 6 mos or appointment in next 3 mos     Recent Outpatient Visits              2 weeks ago Alexis Srivastava MD    Office Visit    1 month ago Bilateral carpal tunnel syndrome Larissa Colin MD    Office Visit    2 months ago Hospital discharge follow-up    Venus Ardon MD    Office Visit    3 months ago Acute pancreatitis, unspecified complication status, unspecified pancreatitis type    [facility], [address], [place], Kahlil Amaral MD    Telemedicine    3 months ago Epigastric pain    [facility], [address], Angle Hummel MD    Office Visit                      Passed - EGFRCR or GFRNAA > 50     GFR Evaluation  EGFRCR: 100 , resulted on 2/15/2023            Passed - GFR in the past 12 months          FOLIC ACID 1 MG Oral Tab [Pharmacy Med Name: Folic Acid 1 MG Oral Tablet] 90 tablet 3     Sig: TAKE 1 TABLET BY MOUTH DAILY       There is no refill protocol information for this order         Recent Outpatient Visits              2 weeks ago Martha Beebe, Cyndee Cope MD    Office Visit    1 month ago Bilateral carpal tunnel syndrome    [place], Luis Garvin MD    Office Visit    2 months ago Hospital discharge follow-up    [facility]ger, Adrian Bartlett MD    Office Visit    3 months ago Acute pancreatitis, unspecified complication status, unspecified pancreatitis type    [facility], [address], Victoriano Alvarez MD    Telemedicine    3 months ago Epigastric pain    [facility], [address], Angle Hummel MD    Office Visit

## 2023-05-19 ENCOUNTER — PATIENT MESSAGE (OUTPATIENT)
Dept: DERMATOLOGY CLINIC | Facility: CLINIC | Age: 53
End: 2023-05-19

## 2023-05-19 RX ORDER — CLOBETASOL PROPIONATE 0.5 MG/G
OINTMENT TOPICAL
Qty: 45 G | Refills: 2 | Status: CANCELLED | OUTPATIENT
Start: 2023-05-19

## 2023-06-06 NOTE — TELEPHONE ENCOUNTER
Dr Susi Simpson, please advise. COVID test still pending. From: Rayne Francois  To: Aguilar Kenyon MD  Sent: 6/16/2022  7:48 PM CDT  Subject: Testing     Good morning, please don't forget I need my doctor's note for work, also let me know when you get my results back from the COVID-19 test, thanks. Yes

## 2023-06-12 ENCOUNTER — TELEPHONE (OUTPATIENT)
Dept: INTERNAL MEDICINE CLINIC | Facility: CLINIC | Age: 53
End: 2023-06-12

## 2023-06-12 NOTE — TELEPHONE ENCOUNTER
Clinical Staff can you please follow up if you see any incoming fax regarding this request.    Patient needs new glucometer. He is covered for [employer] automatic. Patient called [facility] and was told he would get for free. Test strips would be $50. To be sent to:  [facility]  Mail order. Patient states a request will be coming in via fax. Please check. Im Not familiar with pharmacy nor product.

## 2023-06-12 NOTE — TELEPHONE ENCOUNTER
Will check tomorrow. More than likely, was faxed to our [place] [address] location and today  and I are in the [facility] location.

## 2023-06-13 NOTE — TELEPHONE ENCOUNTER
Sent pt [address] him that we have not received his forms as of yet. Provided him w/ correct fax#.

## 2023-06-14 RX ORDER — BLOOD-GLUCOSE METER
1 EACH MISCELLANEOUS
COMMUNITY
End: 2023-06-14

## 2023-06-14 RX ORDER — BLOOD-GLUCOSE METER
1 EACH MISCELLANEOUS DAILY
Qty: 1 EACH | Refills: 0 | Status: SHIPPED | OUTPATIENT
Start: 2023-06-14

## 2023-06-14 NOTE — TELEPHONE ENCOUNTER
Dr, please see below.      Elza Swanson  You 22 minutes ago (1:35 PM)     Thank you very much, if you can let my doctor know I'll be checking my sugar levels 1 or 2 times a day now

## 2023-06-14 NOTE — TELEPHONE ENCOUNTER
, pts requested glucometer has been pended for the [employer] that he request. Please review and sign if appropriate.

## 2023-06-15 NOTE — TELEPHONE ENCOUNTER
Pt stated he wants to check BS twice since it's been high, he p/u RX but was only given 50 test strips because it has test daily, stated he is dedicated to do right this time   Requesting new rx to p/u later

## 2023-06-15 NOTE — TELEPHONE ENCOUNTER
Pharmacist is calling and wants to clarify the directions. She wants to know if the patient is testing 1 or 3 times a day.

## 2023-06-15 NOTE — TELEPHONE ENCOUNTER
Finished dose first let us know before finishing with the strips and let us know of the blood sugars

## 2023-06-15 NOTE — TELEPHONE ENCOUNTER
Spoke with patient, (  Name and  verified ) informed of Dr. Ervin Kent  instructions below    Advised to patient to call back within 35-40 days with  his BG readings     Patient verbalizes understanding and agrees with plan.

## 2023-07-05 ENCOUNTER — PATIENT MESSAGE (OUTPATIENT)
Dept: INTERNAL MEDICINE CLINIC | Facility: CLINIC | Age: 53
End: 2023-07-05

## 2023-07-05 ENCOUNTER — TELEPHONE (OUTPATIENT)
Dept: DERMATOLOGY CLINIC | Facility: CLINIC | Age: 53
End: 2023-07-05

## 2023-07-05 ENCOUNTER — OFFICE VISIT (OUTPATIENT)
Dept: DERMATOLOGY CLINIC | Facility: CLINIC | Age: 53
End: 2023-07-05

## 2023-07-05 ENCOUNTER — TELEPHONE (OUTPATIENT)
Dept: INTERNAL MEDICINE CLINIC | Facility: CLINIC | Age: 53
End: 2023-07-05

## 2023-07-05 DIAGNOSIS — E11.9 TYPE 2 DIABETES MELLITUS WITHOUT COMPLICATION, WITHOUT LONG-TERM CURRENT USE OF INSULIN (HCC): Primary | ICD-10-CM

## 2023-07-05 DIAGNOSIS — Z51.81 MEDICATION MONITORING ENCOUNTER: ICD-10-CM

## 2023-07-05 DIAGNOSIS — L40.9 PSORIASIS: Primary | ICD-10-CM

## 2023-07-05 PROCEDURE — 99214 OFFICE O/P EST MOD 30 MIN: CPT | Performed by: STUDENT IN AN ORGANIZED HEALTH CARE EDUCATION/TRAINING PROGRAM

## 2023-07-05 NOTE — TELEPHONE ENCOUNTER
From: Mahsa Roberts  To: Maida Fleischer, MD  Sent: 7/5/2023 1:56 PM CDT  Subject: Glucose test meter    Good afternoon, the pogo meter has been difficult to use thus I'd like to change to the freestyle harsh 3 I've heard it's always monitoring and I feel this to very useful in my situation please do write a prescription for this device and send to [address] along with a month supply, I believe each supply of test container covers 14 days, any questions please call me back at [phone]

## 2023-07-05 NOTE — PROGRESS NOTES
July 5, 2023    Established patient     CHIEF COMPLAINT: Psoriasis    HISTORY OF PRESENT ILLNESS: .    1. Psoriasis   Location: elbows   Duration: 2 month  Signs and symptoms: peeling  Current treatment: clobetasol 0.05 % External Ointment and Cosentyx  Past treatments: clobetasol 0.05 % External Ointment and Cosentyx        DERM HISTORY:  History of skin cancer: No  History of chronic skin disease/condition: No    FAMILY HISTORY:  History of melanoma: No  History of chronic skin disease/condition: No    History/Other:    REVIEW OF SYSTEMS:  Constitutional: Denies fever, chills, unintentional weight loss. Skin as per HPI    PAST MEDICAL HISTORY:  Past Medical History:   Diagnosis Date    Depression     Diabetes ([facility] [address]     Eczema     Hyperlipidemia     Pancreatitis     Psoriasis        Medications  Current Outpatient Medications   Medication Sig Dispense Refill    Glucose Blood Automatic In Vitro Test 1 Device by In Vitro route in the morning and 1 Device before bedtime. check BS once a day  E11.9 non insulin. 100 each 1    Blood Glucose Monitoring Suppl ([employer] AUTOMATIC BLOOD GLUCOSE) Does not apply Device 1 Device daily. Pt checks BS 1 daily E11.9 1 each 0    OMEPRAZOLE 40 MG Oral Capsule Delayed Release TAKE 1 CAPSULE BY MOUTH DAILY 90 capsule 3    METFORMIN HCL 1000 MG Oral Tab TAKE 1 TABLET BY MOUTH TWICE  DAILY WITH MEALS 497 tablet 3    FOLIC ACID 1 MG Oral Tab TAKE 1 TABLET BY MOUTH DAILY 90 tablet 3    COSENTYX SENSOREADY, 300 MG, 150 MG/ML Subcutaneous Solution Auto-injector       clobetasol 0.05 % External Ointment Apply twice daily  Monday-Friday. Take weekends off. Use on hands and elbows. 45 g 2    gabapentin 100 MG Oral Cap Take 1 capsule (100 mg total) by mouth 3 (three) times daily. 30 capsule 0    Sildenafil Citrate 100 MG Oral Tab Take 1 tablet (100 mg total) by mouth daily as needed for Erectile Dysfunction.  18 tablet 1    lamoTRIgine 25 MG Oral Tab Take 2 tablets (50 mg total) by mouth 2 (two) times daily. PARoxetine 20 MG Oral Tab Take 1 tablet (20 mg total) by mouth at bedtime. Objective:    PHYSICAL EXAM:  General: awake, alert, no acute distress  Skin: Skin exam was performed today including the following: elbows, hands, knees. Pertinent findings include:   - Extensor surfaces with psoriasiform scly plaques involving >5% BSA    ASSESSMENT & PLAN:  Pathophysiology of diagnoses discussed with patient. Therapeutic options reviewed. Risks, benefits, and alternatives discussed with patient. Instructions reviewed at length. # Psoriasis Vulgaris - 5%BSA with possible joint involvement; Concern for AB development with cosentyx given psoriasis now flaring  will plan to switch to [facility]  -Previously tried: cosentyx  -Pt declines h/o TB infection, malignancy, active infection  -Discussed potential risks of Skyrizi including: increased risk of infections (including TB), fatigue, injection site reactions, hypersensitivity, headache, increased liver enzymes, upper respiratory infections, fungal skin infections. Possible increased risk of malignancy. -Medication should not be used in patients with chronic infections or h/o recurrent infections  -It is unclear whether this medication may increase patient's risk for COVID-19 infection or severity of infection should they contract COVID-19. Practice strict infection protection measures.   -Patient expressed understanding of risks and agreeable to starting therapy.    -Pending normal labs would start Skyrizi as follows - Two consecutive injections at different anatomic locations (75 mg each) for a total dose of 150 mg at weeks 0, 4 and then every 12 weeks thereafter.   -Pt should notify us when medication to be delivered. Pt should schedule nursing visit for injection training.   -Pt should notify us if they develop any infections at which time medication would be discontinued until resolved.      High-risk Medication monitoring:   -TB screen ordered Return to clinic: 3 months or sooner if something concerning arises     Emily Quiroga MD

## 2023-07-06 NOTE — TELEPHONE ENCOUNTER
Closed    Other   Case ID: [id number]      Payer: [employer]   [id number] case has been cancelled for [employer] KIT 2 SENSOR, use as directed, for the following reason: The Prior Authorization department is currently reviewing your request. Please reference [id number] for any questions. Reviewed by: Felix Malone Ph.   View History

## 2023-07-06 NOTE — TELEPHONE ENCOUNTER
Skyrizi enrollment form and chart notes faxed to Tom at [place] and [employer] Complete on 7/6/23. Awaiting determination.

## 2023-07-08 LAB
MITOGEN-NIL: 8.59 IU/ML
NIL: 0.1 IU/ML
QUANTIFERON(R)-TB GOLD PLUS, 1 TUBE: NEGATIVE
TB1-NIL: 0.1 IU/ML
TB2-NIL: 0.02 IU/ML

## 2023-07-15 PROCEDURE — 3051F HG A1C>EQUAL 7.0%<8.0%: CPT | Performed by: INTERNAL MEDICINE

## 2023-07-16 PROBLEM — N47.8 FORESKIN PROBLEM: Status: RESOLVED | Noted: 2019-01-11 | Resolved: 2023-07-16

## 2023-07-24 ENCOUNTER — TELEPHONE (OUTPATIENT)
Dept: DERMATOLOGY CLINIC | Facility: CLINIC | Age: 53
End: 2023-07-24

## 2023-07-24 NOTE — TELEPHONE ENCOUNTER
Pt contacted, confirmed name, and . Pt verbalizes understanding, and denies further questions. Went over injection schedule per MD note Two consecutive injections at different anatomic locations (75 mg each) for a total dose of 150 mg at weeks 0, 4 and then every 12 weeks thereafter.

## 2023-07-28 NOTE — TELEPHONE ENCOUNTER
Spoke to [employer] with [employer]. Verbal given for week 4 and x1 maintenance dose 150mg. No refills.

## 2023-07-31 ENCOUNTER — OFFICE VISIT (OUTPATIENT)
Dept: INTERNAL MEDICINE CLINIC | Facility: CLINIC | Age: 53
End: 2023-07-31

## 2023-07-31 VITALS
WEIGHT: 207 LBS | OXYGEN SATURATION: 99 % | HEIGHT: 71 IN | HEART RATE: 80 BPM | RESPIRATION RATE: 18 BRPM | DIASTOLIC BLOOD PRESSURE: 84 MMHG | SYSTOLIC BLOOD PRESSURE: 130 MMHG | TEMPERATURE: 98 F | BODY MASS INDEX: 28.98 KG/M2

## 2023-07-31 DIAGNOSIS — F41.8 DEPRESSION WITH ANXIETY: ICD-10-CM

## 2023-07-31 DIAGNOSIS — Z87.19 HISTORY OF PANCREATITIS: ICD-10-CM

## 2023-07-31 DIAGNOSIS — Z00.00 ANNUAL PHYSICAL EXAM: Primary | ICD-10-CM

## 2023-07-31 DIAGNOSIS — Z71.6 ENCOUNTER FOR SMOKING CESSATION COUNSELING: ICD-10-CM

## 2023-07-31 DIAGNOSIS — E11.9 TYPE 2 DIABETES MELLITUS WITHOUT COMPLICATION, WITHOUT LONG-TERM CURRENT USE OF INSULIN (HCC): ICD-10-CM

## 2023-07-31 DIAGNOSIS — K21.9 GASTROESOPHAGEAL REFLUX DISEASE, UNSPECIFIED WHETHER ESOPHAGITIS PRESENT: ICD-10-CM

## 2023-07-31 DIAGNOSIS — E11.9 ENCOUNTER FOR DIABETIC FOOT EXAM (HCC): ICD-10-CM

## 2023-07-31 DIAGNOSIS — E78.5 HYPERLIPIDEMIA, UNSPECIFIED HYPERLIPIDEMIA TYPE: ICD-10-CM

## 2023-07-31 PROCEDURE — 3008F BODY MASS INDEX DOCD: CPT | Performed by: INTERNAL MEDICINE

## 2023-07-31 PROCEDURE — 99396 PREV VISIT EST AGE 40-64: CPT | Performed by: INTERNAL MEDICINE

## 2023-07-31 PROCEDURE — 3079F DIAST BP 80-89 MM HG: CPT | Performed by: INTERNAL MEDICINE

## 2023-07-31 PROCEDURE — 3075F SYST BP GE 130 - 139MM HG: CPT | Performed by: INTERNAL MEDICINE

## 2023-07-31 NOTE — PROGRESS NOTES
Subjective:     Patient ID: Jalen Nava is a 50year old male. HPI    Patient comes in for annual physical overall doing okay denies any complaints continues to abstain from drinking wine drugs follows [employer] meetings continues to smoke cigarettes 5 to 6 cigarettes a day recent labs noted A1c 7.3 he said he will be working on the diet and exercise little bit better    History/Other:   Review of Systems   Constitutional: Negative. Negative for fatigue and fever. HENT: Negative. Negative for congestion. Eyes: Negative. Respiratory: Negative. Negative for cough, shortness of breath and wheezing. Cardiovascular: Negative. Negative for chest pain, palpitations and leg swelling. Gastrointestinal: Negative. Endocrine: Negative for cold intolerance and heat intolerance. Genitourinary: Negative. Negative for dysuria, flank pain and hematuria. Musculoskeletal: Negative. Negative for arthralgias, back pain and myalgias. Skin: Negative. Neurological: Negative. Negative for dizziness, tremors, syncope, weakness and headaches. Psychiatric/Behavioral: Negative. Negative for agitation, behavioral problems and suicidal ideas. The patient is not nervous/anxious. Current Outpatient Medications   Medication Sig Dispense Refill    Continuous Blood Gluc  (FREESTYLE ROBERT 2 READER) Does not apply Device 1 Device daily. 1 each 0    Continuous Blood Gluc Sensor (FREESTYLE ROBERT 2 SENSOR) Does not apply Misc 1 each every 14 (fourteen) days. 2 each 1    Glucose Blood Automatic In Vitro Test 1 Device by In Vitro route in the morning and 1 Device before bedtime. check BS once a day  E11.9 non insulin.  100 each 1    OMEPRAZOLE 40 MG Oral Capsule Delayed Release TAKE 1 CAPSULE BY MOUTH DAILY 90 capsule 3    METFORMIN HCL 1000 MG Oral Tab TAKE 1 TABLET BY MOUTH TWICE  DAILY WITH MEALS 836 tablet 3    FOLIC ACID 1 MG Oral Tab TAKE 1 TABLET BY MOUTH DAILY 90 tablet 3    clobetasol 0.05 % External Ointment Apply twice daily  Monday-Friday. Take weekends off. Use on hands and elbows. 45 g 2    gabapentin 100 MG Oral Cap Take 1 capsule (100 mg total) by mouth 3 (three) times daily. 30 capsule 0    Sildenafil Citrate 100 MG Oral Tab Take 1 tablet (100 mg total) by mouth daily as needed for Erectile Dysfunction. 18 tablet 1    lamoTRIgine 25 MG Oral Tab Take 2 tablets (50 mg total) by mouth 2 (two) times daily. PARoxetine 20 MG Oral Tab Take 1 tablet (20 mg total) by mouth at bedtime. Blood Glucose Monitoring Suppl ([employer] AUTOMATIC BLOOD GLUCOSE) Does not apply Device 1 Device daily. Pt checks BS 1 daily E11.9 1 each 0    COSENTYX SENSOREADY, 300 MG, 150 MG/ML Subcutaneous Solution Auto-injector        Allergies:  Cat Hair Extract        ITCHING  Dander                  ITCHING    Comment: Cat dander: Watery eyes, sneezing    Past Medical History:   Diagnosis Date    Depression     Diabetes ([address]     Eczema     Hyperlipidemia     Pancreatitis     Psoriasis       Past Surgical History:   Procedure Laterality Date    COLONOSCOPY SCREENING - REFERRAL N/A 07/25/2022    Procedure: COLONOSCOPY-SCREENING/ ESOPHAGOGASTRODUODENOSCOPY (EGD)/ENDOSCOPIC ULTRASOUND with popssible Fine needle aspiration;  Surgeon: Anthony Beyer MD;  Location: [facility] ENDOSCOPY    HERNIA SURGERY        Family History   Problem Relation Age of Onset    Diabetes Mother     Cancer Sister       Social History:   Social History     Socioeconomic History    Marital status: Single   Tobacco Use    Smoking status: Light Smoker     Packs/day: 0.00     Years: 15.00     Pack years: 0.00     Types: Cigarettes    Smokeless tobacco: Never    Tobacco comments:     3-4 a day    Vaping Use    Vaping Use: Never used   Substance and Sexual Activity    Alcohol use: Not Currently     Comment: sober for the past 6 days, was drinking 3-4, 24 oz beers every 3 days.     Drug use: Yes     Types: \"Crack\" cocaine     Comment: smokes crack once a week 40-60$ worth, last use was 12/13/21   Other Topics Concern    Grew up on a farm No    History of tanning No    Outdoor occupation No    Reaction to local anesthetic No    Caffeine Concern Yes    Exercise No    Pt has a pacemaker No    Pt has a defibrillator No   Social History Narrative    The patient does not use an assistive device. .      The patient does live in a home with stairs. Social Determinants of Health  Financial Resource Strain: Low Risk  (2/17/2023)      Financial Resource Strain          Difficulty of Paying Living Expenses: Not hard at all          Med Affordability: No  Transportation Needs: No Transportation Needs (2/17/2023)      Transportation Needs          Lack of Transportation: No     Objective:   Physical Exam  Vitals and nursing note reviewed. Constitutional:       Appearance: He is well-developed. HENT:      Head: Normocephalic and atraumatic. Right Ear: External ear normal.      Left Ear: External ear normal.      Nose: Nose normal.   Eyes:      Conjunctiva/sclera: Conjunctivae normal.      Pupils: Pupils are equal, round, and reactive to light. Cardiovascular:      Rate and Rhythm: Normal rate and regular rhythm. Heart sounds: Normal heart sounds. Pulmonary:      Effort: Pulmonary effort is normal.      Breath sounds: Normal breath sounds. Abdominal:      General: Bowel sounds are normal.      Palpations: Abdomen is soft. Genitourinary:     Penis: Normal.       Prostate: Normal.      Rectum: Normal.   Musculoskeletal:         General: Normal range of motion. Cervical back: Normal range of motion and neck supple. Comments: Bilateral barefoot skin diabetic exam is normal, visualized feet and the appearance is normal.  Bilateral monofilament/sensation of both feet is normal.  Pulsation pedal pulse exam of both lower legs/feet is normal as well. Skin:     General: Skin is warm and dry.    Neurological:      Mental Status: He is alert and oriented to person, place, and time. Deep Tendon Reflexes: Reflexes are normal and symmetric. Assessment & Plan:   Annual physical exam  (primary encounter diagnosis)-exam is okay labs noted  Type 2 diabetes mellitus without complication, without long-term current use of insulin ([facility]) watch diet closely we will retest in 3 months  Hyperlipidemia, unspecified hyperlipidemia type continue current treatment watch diet  Encounter for smoking cessation counseling continues to smoke only few cigarettes a day now will try to quit  Encounter for diabetic foot exam ([facility])Bilateral barefoot skin diabetic exam is normal, visualized feet and the appearance is normal.  Bilateral monofilament/sensation of both feet is normal.  Pulsation pedal pulse exam of both lower legs/feet is normal as well. Depression with anxiety continue current treatment follows with therapist  Gastroesophageal reflux disease, unspecified whether esophagitis present stable as needed medication watch diet  History of pancreatitis stable    No orders of the defined types were placed in this encounter.       Meds This Visit:  Requested Prescriptions      No prescriptions requested or ordered in this encounter       Imaging & Referrals:  OPHTHALMOLOGY - INTERNAL

## 2023-09-05 ENCOUNTER — TELEPHONE (OUTPATIENT)
Dept: DERMATOLOGY CLINIC | Facility: CLINIC | Age: 53
End: 2023-09-05

## 2023-09-06 NOTE — TELEPHONE ENCOUNTER
S/w pt. Pt scheduled for skyrizi injection Nov 20th. He would like to proceed with flu, pneumonia and shingles vaccines, wants to know if you need him to space these apart from his injection?

## 2023-09-14 ENCOUNTER — E-VISIT (OUTPATIENT)
Dept: TELEHEALTH | Age: 53
End: 2023-09-14
Payer: COMMERCIAL

## 2023-09-14 ENCOUNTER — LAB ENCOUNTER (OUTPATIENT)
Dept: LAB | Facility: HOSPITAL | Age: 53
End: 2023-09-14
Attending: NURSE PRACTITIONER
Payer: COMMERCIAL

## 2023-09-14 ENCOUNTER — TELEPHONE (OUTPATIENT)
Dept: INTERNAL MEDICINE CLINIC | Facility: CLINIC | Age: 53
End: 2023-09-14

## 2023-09-14 ENCOUNTER — PATIENT MESSAGE (OUTPATIENT)
Dept: TELEHEALTH | Age: 53
End: 2023-09-14

## 2023-09-14 DIAGNOSIS — B34.9 ACUTE VIRAL SYNDROME: ICD-10-CM

## 2023-09-14 DIAGNOSIS — J02.9 PHARYNGITIS, UNSPECIFIED ETIOLOGY: ICD-10-CM

## 2023-09-14 DIAGNOSIS — B34.9 ACUTE VIRAL SYNDROME: Primary | ICD-10-CM

## 2023-09-14 DIAGNOSIS — J02.0 STREP PHARYNGITIS: ICD-10-CM

## 2023-09-14 LAB — SARS-COV-2 RNA RESP QL NAA+PROBE: NOT DETECTED

## 2023-09-14 PROCEDURE — 87430 STREP A AG IA: CPT

## 2023-09-14 RX ORDER — AMOXICILLIN 875 MG/1
875 TABLET, COATED ORAL 2 TIMES DAILY
Qty: 20 TABLET | Refills: 0 | Status: SHIPPED | OUTPATIENT
Start: 2023-09-14 | End: 2023-09-24

## 2023-09-19 ENCOUNTER — TELEPHONE (OUTPATIENT)
Dept: DERMATOLOGY CLINIC | Facility: CLINIC | Age: 53
End: 2023-09-19

## 2023-09-29 NOTE — TELEPHONE ENCOUNTER
Spoke to [employer] from [employer]. Updated patient account. Patient is no longer taking Cosentyx, switched to [employer].

## 2023-12-08 RX ORDER — SILDENAFIL 100 MG/1
100 TABLET, FILM COATED ORAL DAILY PRN
Qty: 18 TABLET | Refills: 1 | Status: SHIPPED | OUTPATIENT
Start: 2023-12-08

## 2023-12-08 NOTE — TELEPHONE ENCOUNTER
Refill passed per [place] [employer] [place], [facility] protocol.   Requested Prescriptions   Pending Prescriptions Disp Refills    SILDENAFIL CITRATE 100 MG Oral Tab [Pharmacy Med Name: Sildenafil Citrate 100 MG Oral Tablet] 18 tablet 1     Sig: TAKE 1 TABLET BY MOUTH DAILY AS  NEEDED FOR ERECTILE DYSFUNCTION       Genitourinary Medications Passed - 12/7/2023  7:52 AM        Passed - Patient does not have pulmonary hypertension on problem list        Passed - In person appointment or virtual visit in the past 12 mos or appointment in next 3 mos     Recent Outpatient Visits              2 months ago Acute viral syndrome    [facility], Virtual Visit Sheela Patel APRN    E-Visit    4 months ago Annual physical exam    Cheyenne Rosenberg MD    Office Visit    5 months ago Trip Rodriguez MD    Office Visit    7 months ago Francisco Davalos MD    Office Visit    8 months ago Bilateral carpal tunnel syndrome    Bambi Officer, MD    Office Visit                         Recent Outpatient Visits              2 months ago Acute viral syndrome    [facility], Virtual Visit Sheela Patel APRN    E-Visit    4 months ago Annual physical exam    Cheyenne Rosenberg MD    Office Visit    5 months ago Trip Rodriguez MD    Office Visit    7 months ago Trip Rodriguez MD    Office Visit    8 months ago Bilateral carpal tunnel syndrome    Ivan Gagnon, [address], Mariajose Hollingsworth MD    Office Visit

## 2024-01-02 ENCOUNTER — TELEPHONE (OUTPATIENT)
Dept: DERMATOLOGY CLINIC | Facility: CLINIC | Age: 54
End: 2024-01-02

## 2024-01-11 RX ORDER — RISANKIZUMAB-RZAA 150 MG/ML
150 INJECTION SUBCUTANEOUS
Qty: 1 ML | Refills: 0 | Status: SHIPPED | OUTPATIENT
Start: 2024-01-11

## 2024-01-11 NOTE — TELEPHONE ENCOUNTER
Refill Request for medication(s):     Last Office Visit: 7/5/23    Last Refill: per [place]  last sent out 11/1/23    Pharmacy, Dosage verified: yes    Condition Update (if applicable):     Rx pended and sent to provider for approval, please advise. Thank You!    S/w pt, informed he needs to be seen, appt scheduled for this Monday 1/15    Do you want to wait with refill until then?

## 2024-01-15 ENCOUNTER — OFFICE VISIT (OUTPATIENT)
Dept: DERMATOLOGY CLINIC | Facility: CLINIC | Age: 54
End: 2024-01-15
Payer: COMMERCIAL

## 2024-01-15 DIAGNOSIS — L40.9 PSORIASIS: Primary | ICD-10-CM

## 2024-01-15 PROCEDURE — 99214 OFFICE O/P EST MOD 30 MIN: CPT | Performed by: STUDENT IN AN ORGANIZED HEALTH CARE EDUCATION/TRAINING PROGRAM

## 2024-01-15 RX ORDER — CLOBETASOL PROPIONATE 0.5 MG/G
OINTMENT TOPICAL
Qty: 45 G | Refills: 2 | Status: SHIPPED | OUTPATIENT
Start: 2024-01-15

## 2024-01-15 NOTE — PROGRESS NOTES
January 15, 2024    Established patient     Referred by:   No referring provider defined for this encounter.      CHIEF COMPLAINT: Psoriasis    HISTORY OF PRESENT ILLNESS: Patel Lechuga is a 53 year old male here for evaluation of rash.    Location: [place]  Duration: 6 months  Signs and symptoms: Patient has improvement  Current treatment: Skyrizi  Past treatments: Consentyx    Personal Dermatologic History  History of chronic skin disease: Yes    Family History  History of chronic skin disease: No  History autoimmune diseases:  No    Past Medical History  Past Medical History:   Diagnosis Date    Depression     Diabetes (HCC)     Eczema     Hyperlipidemia     Pancreatitis     Psoriasis        REVIEW OF SYSTEMS:  Constitutional: Denies fever, chills, unintentional weight loss.   Skin as per HPI    Medications  Current Outpatient Medications   Medication Sig Dispense Refill    Risankizumab-rzaa (SKYRIZI PEN) 150 MG/ML Subcutaneous Solution Auto-injector Inject 150 mg into the skin every 3 (three) months. 1 mL 0    Sildenafil Citrate 100 MG Oral Tab Take 1 tablet (100 mg total) by mouth daily as needed for Erectile Dysfunction. 18 tablet 1    Continuous Blood Gluc  (FREESTYLE ROBERT 2 READER) Does not apply Device 1 Device daily. 1 each 0    Continuous Blood Gluc Sensor (FREESTYLE ROBERT 2 SENSOR) Does not apply Misc 1 each every 14 (fourteen) days. 2 each 1    Glucose Blood Automatic In Vitro Test 1 Device by In Vitro route in the morning and 1 Device before bedtime. check BS once a day  E11.9 non insulin. 100 each 1    Blood Glucose Monitoring Suppl (POGO AUTOMATIC BLOOD GLUCOSE) Does not apply Device 1 Device daily. Pt checks BS 1 daily E11.9 1 each 0    OMEPRAZOLE 40 MG Oral Capsule Delayed Release TAKE 1 CAPSULE BY MOUTH DAILY 90 capsule 3    METFORMIN HCL 1000 MG Oral Tab TAKE 1 TABLET BY MOUTH TWICE  DAILY WITH MEALS 180 tablet 3    FOLIC ACID 1 MG Oral Tab TAKE 1 TABLET BY MOUTH DAILY 90 tablet 3     COSENTYX SENSOREADY, 300 MG, 150 MG/ML Subcutaneous Solution Auto-injector       clobetasol 0.05 % External Ointment Apply twice daily  Monday-Friday. Take weekends off. Use on hands and elbows. 45 g 2    gabapentin 100 MG Oral Cap Take 1 capsule (100 mg total) by mouth 3 (three) times daily. 30 capsule 0    lamoTRIgine 25 MG Oral Tab Take 2 tablets (50 mg total) by mouth 2 (two) times daily.      PARoxetine 20 MG Oral Tab Take 1 tablet (20 mg total) by mouth at bedtime.         PHYSICAL EXAM:  General: awake, alert, no acute distress  Skin: Skin exam was performed today including the following: trunk and extremities. Pertinent findings include:   - with thin pink scaly plaques, improved from baseline    ASSESSMENT & PLAN:  Pathophysiology of diagnoses discussed with patient.  Therapeutic options reviewed. Risks, benefits, and alternatives discussed with patient. Instructions reviewed at length.    #Psoriasis - improved on Skyrizi  #Psoriatic arthritis  - Continue Skyrizi every 3 months  - clobetasol 0.05% twice daily to affected areas Monday-Friday. Take weekends off. Avoid use on face, breasts, groin, or axillae.      High risk medication monitoring   -TB screening - repeat annually  -No live vaccines  -Flu shot yearly      Return to clinic: 6 months or sooner if something concerning arises    Maximino Scott MD

## 2024-01-30 ENCOUNTER — TELEPHONE (OUTPATIENT)
Dept: DERMATOLOGY CLINIC | Facility: CLINIC | Age: 54
End: 2024-01-30

## 2024-01-30 ENCOUNTER — TELEPHONE (OUTPATIENT)
Dept: INTERNAL MEDICINE CLINIC | Facility: CLINIC | Age: 54
End: 2024-01-30

## 2024-01-30 ENCOUNTER — TELEMEDICINE (OUTPATIENT)
Dept: INTERNAL MEDICINE CLINIC | Facility: CLINIC | Age: 54
End: 2024-01-30
Payer: COMMERCIAL

## 2024-01-30 DIAGNOSIS — R73.09 ELEVATED GLUCOSE LEVEL: Primary | ICD-10-CM

## 2024-01-30 DIAGNOSIS — R53.83 OTHER FATIGUE: ICD-10-CM

## 2024-01-30 NOTE — TELEPHONE ENCOUNTER
Patient is requesting the lab orders placed be emailed to:  [email or web address]    Please advise

## 2024-01-31 NOTE — TELEPHONE ENCOUNTER
ASTRIDTCMANDY Montiel at [facility].  It looks like chart notes were faxed in the beginning of the month to start PA with no f/u.  Await call back.

## 2024-02-01 LAB
ALBUMIN/GLOBULIN RATIO: 1.4 (CALC) (ref 1–2.5)
ALBUMIN: 4.3 G/DL (ref 3.6–5.1)
ALKALINE PHOSPHATASE: 81 U/L (ref 35–144)
ALT: 31 U/L (ref 9–46)
AST: 33 U/L (ref 10–35)
BILIRUBIN, TOTAL: 0.4 MG/DL (ref 0.2–1.2)
BUN: 17 MG/DL (ref 7–25)
CALCIUM: 9.8 MG/DL (ref 8.6–10.3)
CARBON DIOXIDE: 26 MMOL/L (ref 20–32)
CHLORIDE: 98 MMOL/L (ref 98–110)
CREATININE: 0.89 MG/DL (ref 0.7–1.3)
EGFR: 104 ML/MIN/1.73M2
GLOBULIN: 3 G/DL (CALC) (ref 1.9–3.7)
GLUCOSE: 267 MG/DL (ref 65–99)
HEMOGLOBIN A1C: 9.4 % OF TOTAL HGB
POTASSIUM: 4.5 MMOL/L (ref 3.5–5.3)
PROTEIN, TOTAL: 7.3 G/DL (ref 6.1–8.1)
SODIUM: 132 MMOL/L (ref 135–146)
TSH W/REFLEX TO FT4: 2.52 MIU/L (ref 0.4–4.5)

## 2024-02-02 RX ORDER — GLIPIZIDE 2.5 MG/1
2.5 TABLET, EXTENDED RELEASE ORAL
Qty: 90 TABLET | Refills: 0 | Status: SHIPPED | OUTPATIENT
Start: 2024-02-02

## 2024-02-03 NOTE — PROGRESS NOTES
Subjective:     Patient ID: Patel Lechuga is a 53 year old male.    HPI  Patient comes in with complaint increased blood sugars lately even though he is trying to watch his diet feeling more fatigued due to it    History/Other:   Review of Systems   Constitutional:  Positive for fatigue. Negative for fever.   HENT: Negative.  Negative for congestion.    Eyes: Negative.    Respiratory: Negative.  Negative for cough, shortness of breath and wheezing.    Cardiovascular: Negative.  Negative for chest pain, palpitations and leg swelling.   Gastrointestinal: Negative.    Endocrine: Negative for cold intolerance and heat intolerance.        Fara bs   Genitourinary: Negative.  Negative for dysuria, flank pain and hematuria.   Musculoskeletal: Negative.  Negative for arthralgias, back pain and myalgias.   Skin: Negative.    Neurological: Negative.  Negative for dizziness, tremors, syncope, weakness and headaches.   Psychiatric/Behavioral: Negative.  Negative for agitation, behavioral problems and suicidal ideas. The patient is not nervous/anxious.      Current Outpatient Medications   Medication Sig Dispense Refill    glipiZIDE ER 2.5 MG Oral Tablet 24 Hr Take 1 tablet (2.5 mg total) by mouth daily with breakfast. 90 tablet 0    clobetasol 0.05 % External Ointment Apply twice daily  Monday-Friday. Take weekends off. Use on hands and elbows. 45 g 2    Risankizumab-rzaa (SKYRIZI PEN) 150 MG/ML Subcutaneous Solution Auto-injector Inject 150 mg into the skin every 3 (three) months. 1 mL 0    Sildenafil Citrate 100 MG Oral Tab Take 1 tablet (100 mg total) by mouth daily as needed for Erectile Dysfunction. 18 tablet 1    Continuous Blood Gluc  (FREESTYLE ROBERT 2 READER) Does not apply Device 1 Device daily. 1 each 0    Continuous Blood Gluc Sensor (FREESTYLE ROBERT 2 SENSOR) Does not apply Misc 1 each every 14 (fourteen) days. 2 each 1    Glucose Blood Automatic In Vitro Test 1 Device by In Vitro route in the morning and 1  Device before bedtime. check BS once a day  E11.9 non insulin. 100 each 1    Blood Glucose Monitoring Suppl (POGO AUTOMATIC BLOOD GLUCOSE) Does not apply Device 1 Device daily. Pt checks BS 1 daily E11.9 1 each 0    OMEPRAZOLE 40 MG Oral Capsule Delayed Release TAKE 1 CAPSULE BY MOUTH DAILY 90 capsule 3    METFORMIN HCL 1000 MG Oral Tab TAKE 1 TABLET BY MOUTH TWICE  DAILY WITH MEALS 180 tablet 3    FOLIC ACID 1 MG Oral Tab TAKE 1 TABLET BY MOUTH DAILY 90 tablet 3    COSENTYX SENSOREADY, 300 MG, 150 MG/ML Subcutaneous Solution Auto-injector       gabapentin 100 MG Oral Cap Take 1 capsule (100 mg total) by mouth 3 (three) times daily. 30 capsule 0    lamoTRIgine 25 MG Oral Tab Take 2 tablets (50 mg total) by mouth 2 (two) times daily.      PARoxetine 20 MG Oral Tab Take 1 tablet (20 mg total) by mouth at bedtime.       Allergies:  Allergies   Allergen Reactions    Cat Hair Extract ITCHING    Dander ITCHING      Cat dander: Watery eyes, sneezing       Past Medical History:   Diagnosis Date    Depression     Diabetes (HCC)     Eczema     Hyperlipidemia     Pancreatitis     Psoriasis       Past Surgical History:   Procedure Laterality Date    COLONOSCOPY SCREENING - REFERRAL N/A 07/25/2022    Procedure: COLONOSCOPY-SCREENING/ ESOPHAGOGASTRODUODENOSCOPY (EGD)/ENDOSCOPIC ULTRASOUND with popssible Fine needle aspiration;  Surgeon: Eliezer Roth MD;  Location: [facility] ENDOSCOPY    HERNIA SURGERY        Family History   Problem Relation Age of Onset    Diabetes Mother     Cancer Sister       Social History:   Social History     Socioeconomic History    Marital status: Single   Tobacco Use    Smoking status: Light Smoker     Packs/day: 0.00     Years: 15.00     Additional pack years: 0.00     Total pack years: 0.00     Types: Cigarettes     Passive exposure: Current    Smokeless tobacco: Never    Tobacco comments:     3-4 a day    Vaping Use    Vaping Use: Never used   Substance and Sexual Activity    Alcohol use: Not  Currently     Comment: sober for the past 6 days, was drinking 3-4, 24 oz beers every 3 days.    Drug use: Yes     Types: \"Crack\" cocaine     Comment: smokes crack once a week 40-60$ worth, last use was 12/13/21   Other Topics Concern    Grew up on a farm No    History of tanning No    Outdoor occupation No    Reaction to local anesthetic No    Caffeine Concern Yes    Exercise No    Pt has a pacemaker No    Pt has a defibrillator No   Social History Narrative    The patient does not use an assistive device..      The patient does live in a home with stairs.     Social Determinants of Health     Financial Resource Strain: Low Risk  (2/17/2023)    Financial Resource Strain     Difficulty of Paying Living Expenses: Not hard at all     Med Affordability: No   Transportation Needs: No Transportation Needs (2/17/2023)    Transportation Needs     Lack of Transportation: No        Objective:   Physical Exam  Constitutional:       Appearance: He is not ill-appearing.   Pulmonary:      Effort: No respiratory distress.   Neurological:      Mental Status: He is oriented to person, place, and time.         Assessment & Plan:   1. Elevated glucose level we will check A1c we will follow results watch diet closer monitor blood sugars    2. Other fatigue we will check other labs electrolytes thyroid funtion   25 min spent    Orders Placed This Encounter   Procedures    Comp Metabolic Panel (14)    TSH W Reflex To Free T4    Hemoglobin A1C    Hemoglobin A1C       Meds This Visit:  Requested Prescriptions      No prescriptions requested or ordered in this encounter       Imaging & Referrals:  DIABETIC EDUCATION - INTERNAL

## 2024-03-25 RX ORDER — CLOBETASOL PROPIONATE 0.5 MG/G
OINTMENT TOPICAL
Qty: 45 G | Refills: 2 | Status: SHIPPED | OUTPATIENT
Start: 2024-03-25

## 2024-03-25 NOTE — TELEPHONE ENCOUNTER
LOV 1/15/24 - Fax from [facility] for Clobetasol Ointment.  Rx pended.  Ok to send?  Thank you.

## 2024-03-26 RX ORDER — GLIPIZIDE 2.5 MG/1
2.5 TABLET, EXTENDED RELEASE ORAL
Qty: 90 TABLET | Refills: 0 | Status: SHIPPED | OUTPATIENT
Start: 2024-03-26

## 2024-03-26 NOTE — TELEPHONE ENCOUNTER
Please review; protocol failed/ has no protocol    Requested Prescriptions   Pending Prescriptions Disp Refills    GLIPIZIDE ER 2.5 MG Oral Tablet 24 Hr [Pharmacy Med Name: glipiZIDE ER 2.5 MG Oral Tablet Extended Release 24 Hour] 90 tablet 3     Sig: TAKE 1 TABLET BY MOUTH DAILY  WITH BREAKFAST       Diabetes Medication Protocol Failed - 3/26/2024  2:02 PM        Failed - Last A1C < 7.5 and within past 6 months     Lab Results   Component Value Date    A1C 9.4 (H) 01/31/2024             Failed - Microalbumin procedure in past 12 months or taking ACE/ARB        Passed - In person appointment or virtual visit in the past 6 mos or appointment in next 3 mos     Recent Outpatient Visits              1 month ago Elevated glucose level    [facility], Aram Jennings MD    Telemedicine    2 months ago Psoriasis    [facility] [place], Daniel, MD    Office Visit    6 months ago Acute viral syndrome    [facility], Virtual Visit Sheela Patel APRN    E-Visit    [facility]    [facility]    8 months ago [facility], Maximino Rose MD    Office Visit                      Passed - EGFRCR or GFRNAA > 50     GFR Evaluation  EGFRCR: 104 , resulted on 1/31/2024          Passed - GFR in the past 12 months           Recent Outpatient Visits              1 month ago Elevated glucose level    [facility], Aram Jennings MD    Telemedicine    2 months ago Psoriasis    [facility] [place] Michalik, Daniel, MD    Office Visit    6 months ago Acute viral syndrome    [facility], Virtual Visit Sheela Patel APRN    E-Visit    7 months ago Annual physical exam    [place]  [facility] [place]    Office Visit    8 months ago Psoriasis    [facility] [place] Maximino Scott MD    Office Visit

## 2024-04-15 RX ORDER — RISANKIZUMAB-RZAA 150 MG/ML
150 INJECTION SUBCUTANEOUS
Qty: 1 ML | Refills: 1 | Status: SHIPPED | OUTPATIENT
Start: 2024-04-15

## 2024-04-15 NOTE — TELEPHONE ENCOUNTER
Refill Request for medication(s):     Last Office Visit: 1/15/24    Last Refill: 1/11/24    Pharmacy, Dosage verified: yes    Condition Update (if applicable):     Quant gold 7/6/23 negative

## 2024-04-24 NOTE — TELEPHONE ENCOUNTER
Care Update:     No acute events overnight. Patient in room 311/311 A. Blood glucose stable on current inpatient regimen.No hypoglycemia noted over the past 24-hours. Endocrine will continue to follow and manage insulin orders inpatient.       Steroid use- None.   Renal function-   Lab Results   Component Value Date    CREATININE 5.6 (H) 04/24/2024        Diet Renal Coumadin Restriction; Fluid - 1500mL; Isolation Tray - Regular [place]     POCT Glucose   Date Value Ref Range Status   04/24/2024 103 70 - 110 mg/dL Final   04/23/2024 107 70 - 110 mg/dL Final   04/23/2024 207 (H) 70 - 110 mg/dL Final   04/23/2024 167 (H) 70 - 110 mg/dL Final   04/23/2024 133 (H) 70 - 110 mg/dL Final   04/22/2024 181 (H) 70 - 110 mg/dL Final   04/22/2024 184 (H) 70 - 110 mg/dL Final   04/22/2024 118 (H) 70 - 110 mg/dL Final   04/21/2024 163 (H) 70 - 110 mg/dL Final     Lab Results   Component Value Date    HGBA1C 7.4 (H) 04/17/2024       Endocrine  Type 2 diabetes mellitus without complication, without long-term current use of insulin  Endocrinology consulted for BG management.   BG goal 140-180     - Novolog (Insulin Aspart) prn for BG excursions LDC SSI (150/50)  - BG checks AC/HS  - Hypoglycemia protocol in place     ** Please notify Endocrine for any change and/or advance in diet**  ** Please call Endocrine for any BG related issues **     Discharge Planning:   TBD. Please notify endocrinology prior to discharge.     Fax from [facility]    Please refill Clobetasol    Placed fax in pa inbox

## 2024-05-03 RX ORDER — FOLIC ACID 1 MG/1
1 TABLET ORAL DAILY
Qty: 90 TABLET | Refills: 3 | Status: SHIPPED | OUTPATIENT
Start: 2024-05-03

## 2024-05-03 RX ORDER — SILDENAFIL 100 MG/1
100 TABLET, FILM COATED ORAL DAILY PRN
Qty: 18 TABLET | Refills: 1 | Status: SHIPPED | OUTPATIENT
Start: 2024-05-03

## 2024-05-03 NOTE — TELEPHONE ENCOUNTER
Refill passed per [facility] protocol.  Requested Prescriptions   Pending Prescriptions Disp Refills    SILDENAFIL CITRATE 100 MG Oral Tab [Pharmacy Med Name: Sildenafil Citrate 100 MG Oral Tablet] 18 tablet 1     Sig: TAKE 1 TABLET BY MOUTH DAILY AS  NEEDED FOR ERECTILE DYSFUNCTION       Genitourinary Medications Passed - 5/2/2024 11:56 AM        Passed - Patient does not have pulmonary hypertension on problem list        Passed - In person appointment or virtual visit in the past 12 mos or appointment in next 3 mos     Recent Outpatient Visits              3 months ago Elevated glucose level    [facility], Aram Jennings MD    Telemedicine    3 months ago Psoriasis    [facility] [place] Michalik, Daniel, MD    Office Visit    7 months ago Acute viral syndrome    [facility], Virtual Visit Sheela Patel APRN    E-Visit    9 months ago [facility]    [facility]    10 months ago Psoriasis    [facility], Maximino Rose MD    Office Visit                        FOLIC ACID 1 MG Oral Tab [Pharmacy Med Name: Folic Acid 1 MG Oral Tablet] 90 tablet 3     Sig: TAKE 1 TABLET BY MOUTH DAILY       There is no refill protocol information for this order          Recent Outpatient Visits              3 months ago Elevated glucose level    [facility], Aram Jennings MD    Telemedicine    3 months ago Psoriasis    [facility] [place] Michalik, Daniel, MD    Office Visit    7 months ago Acute viral syndrome    [facility], Virtual Visit Sheela Patel APRN    E-Visit    9 months ago [facility] physical exam    [facility]    [facility]  Visit    10 months ago Psoriasis    [facility] [place]    Office Visit

## 2024-05-03 NOTE — TELEPHONE ENCOUNTER
Please review; protocol failed/No Protocol    Last Office Visit: Telemedicine: 01/30/2024    No Active/Future folic acid labs pended     Requested Prescriptions   Pending Prescriptions Disp Refills    folic acid 1 MG Oral Tab [Pharmacy Med Name: Folic Acid 1 MG Oral Tablet] 90 tablet 3     Sig: Take 1 tablet (1 mg total) by mouth daily.       There is no refill protocol information for this order      Signed Prescriptions Disp Refills    Sildenafil Citrate 100 MG Oral Tab 18 tablet 1     Sig: Take 1 tablet (100 mg total) by mouth daily as needed for Erectile Dysfunction.       Genitourinary Medications Passed - 5/2/2024 11:56 AM        Passed - Patient does not have pulmonary hypertension on problem list        Passed - In person appointment or virtual visit in the past 12 mos or appointment in next 3 mos     Recent Outpatient Visits              3 months ago Elevated glucose level    [facility], Aram Jennings MD    Telemedicine    3 months ago Psoriasis    [facility] [place] Michalik, Daniel, MD    Office Visit    7 months ago Acute viral syndrome    [facility], Virtual Visit Sheela Patel APRN    E-Visit    9 months ago Annual physical exam    [facility] [place], Aram Herrera MD    Office Visit    10 months ago Psoriasis    [facility], Maximino Rose MD    Office Visit                           Recent Outpatient Visits              3 months ago Elevated glucose level    [facility], Aram Jennings MD    Telemedicine    3 months ago Psoriasis    [facility] [place] Michalik, Daniel, MD    Office Visit    7 months ago Acute viral syndrome    [facility], Virtual Visit Sheela Patel APRN    E-Visit    9 months ago Annual  physical exam    [facility] [place] Wero Pollock Agron B, MD    Office Visit    10 months ago Psoriasis    [facility], Maximino Rose MD    Office Visit

## 2024-05-07 ENCOUNTER — MED REC SCAN ONLY (OUTPATIENT)
Dept: INTERNAL MEDICINE CLINIC | Facility: CLINIC | Age: 54
End: 2024-05-07

## 2024-05-26 ENCOUNTER — HOSPITAL ENCOUNTER (OUTPATIENT)
Facility: HOSPITAL | Age: 54
Setting detail: OBSERVATION
Discharge: HOME | DRG: 440 | End: 2024-05-27
Attending: EMERGENCY MEDICINE | Admitting: INTERNAL MEDICINE

## 2024-05-26 ENCOUNTER — APPOINTMENT (OUTPATIENT)
Dept: CT IMAGING | Facility: HOSPITAL | Age: 54
DRG: 440 | End: 2024-05-26
Attending: EMERGENCY MEDICINE

## 2024-05-26 DIAGNOSIS — K85.90 ACUTE PANCREATITIS, UNSPECIFIED COMPLICATION STATUS, UNSPECIFIED PANCREATITIS TYPE (HCC): Primary | ICD-10-CM

## 2024-05-26 LAB
ALBUMIN SERPL-MCNC: 4.5 G/DL (ref 3.2–4.8)
ALBUMIN/GLOB SERPL: 1.4 {RATIO} (ref 1–2)
ALP LIVER SERPL-CCNC: 84 U/L
ALT SERPL-CCNC: 28 U/L
ANION GAP SERPL CALC-SCNC: 5 MMOL/L (ref 0–18)
AST SERPL-CCNC: 34 U/L (ref ?–34)
BASOPHILS # BLD AUTO: 0.06 X10(3) UL (ref 0–0.2)
BASOPHILS NFR BLD AUTO: 0.9 %
BILIRUB SERPL-MCNC: 0.4 MG/DL (ref 0.3–1.2)
BUN BLD-MCNC: 8 MG/DL (ref 9–23)
BUN/CREAT SERPL: 6.5 (ref 10–20)
CALCIUM BLD-MCNC: 9.4 MG/DL (ref 8.7–10.4)
CHLORIDE SERPL-SCNC: 106 MMOL/L (ref 98–112)
CO2 SERPL-SCNC: 27 MMOL/L (ref 21–32)
CREAT BLD-MCNC: 1.24 MG/DL
DEPRECATED RDW RBC AUTO: 36.9 FL (ref 35.1–46.3)
EGFRCR SERPLBLD CKD-EPI 2021: 70 ML/MIN/1.73M2 (ref 60–?)
EOSINOPHIL # BLD AUTO: 0.18 X10(3) UL (ref 0–0.7)
EOSINOPHIL NFR BLD AUTO: 2.6 %
ERYTHROCYTE [DISTWIDTH] IN BLOOD BY AUTOMATED COUNT: 11.7 % (ref 11–15)
EST. AVERAGE GLUCOSE BLD GHB EST-MCNC: 183 MG/DL (ref 68–126)
ETHANOL SERPL-MCNC: <3 MG/DL (ref ?–3)
GLOBULIN PLAS-MCNC: 3.3 G/DL (ref 2–3.5)
GLUCOSE BLD-MCNC: 259 MG/DL (ref 70–99)
GLUCOSE BLDC GLUCOMTR-MCNC: 111 MG/DL (ref 70–99)
GLUCOSE BLDC GLUCOMTR-MCNC: 116 MG/DL (ref 70–99)
GLUCOSE BLDC GLUCOMTR-MCNC: 153 MG/DL (ref 70–99)
HBA1C MFR BLD: 8 % (ref ?–5.7)
HCT VFR BLD AUTO: 44.7 %
HGB BLD-MCNC: 15.5 G/DL
IMM GRANULOCYTES # BLD AUTO: 0.03 X10(3) UL (ref 0–1)
IMM GRANULOCYTES NFR BLD: 0.4 %
LIPASE SERPL-CCNC: 104 U/L (ref 13–75)
LYMPHOCYTES # BLD AUTO: 1.56 X10(3) UL (ref 1–4)
LYMPHOCYTES NFR BLD AUTO: 22.9 %
MCH RBC QN AUTO: 30 PG (ref 26–34)
MCHC RBC AUTO-ENTMCNC: 34.7 G/DL (ref 31–37)
MCV RBC AUTO: 86.5 FL
MONOCYTES # BLD AUTO: 0.55 X10(3) UL (ref 0.1–1)
MONOCYTES NFR BLD AUTO: 8.1 %
NEUTROPHILS # BLD AUTO: 4.42 X10 (3) UL (ref 1.5–7.7)
NEUTROPHILS # BLD AUTO: 4.42 X10(3) UL (ref 1.5–7.7)
NEUTROPHILS NFR BLD AUTO: 65.1 %
OSMOLALITY SERPL CALC.SUM OF ELEC: 293 MOSM/KG (ref 275–295)
PLATELET # BLD AUTO: 250 10(3)UL (ref 150–450)
POTASSIUM SERPL-SCNC: 4.5 MMOL/L (ref 3.5–5.1)
PROT SERPL-MCNC: 7.8 G/DL (ref 5.7–8.2)
RBC # BLD AUTO: 5.17 X10(6)UL
SODIUM SERPL-SCNC: 138 MMOL/L (ref 136–145)
TRIGL SERPL-MCNC: 251 MG/DL (ref 30–149)
WBC # BLD AUTO: 6.8 X10(3) UL (ref 4–11)

## 2024-05-26 PROCEDURE — 99222 1ST HOSP IP/OBS MODERATE 55: CPT | Performed by: INTERNAL MEDICINE

## 2024-05-26 PROCEDURE — 74177 CT ABD & PELVIS W/CONTRAST: CPT | Performed by: EMERGENCY MEDICINE

## 2024-05-26 RX ORDER — NICOTINE POLACRILEX 4 MG
30 LOZENGE BUCCAL
Status: DISCONTINUED | OUTPATIENT
Start: 2024-05-26 | End: 2024-05-27

## 2024-05-26 RX ORDER — DEXTROSE MONOHYDRATE 25 G/50ML
50 INJECTION, SOLUTION INTRAVENOUS
Status: DISCONTINUED | OUTPATIENT
Start: 2024-05-26 | End: 2024-05-27

## 2024-05-26 RX ORDER — PAROXETINE HYDROCHLORIDE 20 MG/1
20 TABLET, FILM COATED ORAL NIGHTLY
Status: DISCONTINUED | OUTPATIENT
Start: 2024-05-26 | End: 2024-05-27

## 2024-05-26 RX ORDER — SENNOSIDES 8.6 MG
17.2 TABLET ORAL NIGHTLY PRN
Status: DISCONTINUED | OUTPATIENT
Start: 2024-05-26 | End: 2024-05-27

## 2024-05-26 RX ORDER — PROCHLORPERAZINE EDISYLATE 5 MG/ML
5 INJECTION INTRAMUSCULAR; INTRAVENOUS EVERY 8 HOURS PRN
Status: DISCONTINUED | OUTPATIENT
Start: 2024-05-26 | End: 2024-05-27

## 2024-05-26 RX ORDER — MORPHINE SULFATE 2 MG/ML
2 INJECTION, SOLUTION INTRAMUSCULAR; INTRAVENOUS EVERY 2 HOUR PRN
Status: DISCONTINUED | OUTPATIENT
Start: 2024-05-26 | End: 2024-05-27

## 2024-05-26 RX ORDER — MORPHINE SULFATE 4 MG/ML
4 INJECTION, SOLUTION INTRAMUSCULAR; INTRAVENOUS EVERY 2 HOUR PRN
Status: DISCONTINUED | OUTPATIENT
Start: 2024-05-26 | End: 2024-05-27

## 2024-05-26 RX ORDER — MORPHINE SULFATE 4 MG/ML
4 INJECTION, SOLUTION INTRAMUSCULAR; INTRAVENOUS EVERY 30 MIN PRN
Status: ACTIVE | OUTPATIENT
Start: 2024-05-26 | End: 2024-05-26

## 2024-05-26 RX ORDER — PANTOPRAZOLE SODIUM 40 MG/1
40 TABLET, DELAYED RELEASE ORAL
Status: DISCONTINUED | OUTPATIENT
Start: 2024-05-27 | End: 2024-05-27

## 2024-05-26 RX ORDER — PROCHLORPERAZINE EDISYLATE 5 MG/ML
10 INJECTION INTRAMUSCULAR; INTRAVENOUS ONCE
Status: COMPLETED | OUTPATIENT
Start: 2024-05-26 | End: 2024-05-26

## 2024-05-26 RX ORDER — NALOXONE HYDROCHLORIDE 0.4 MG/ML
0.08 INJECTION, SOLUTION INTRAMUSCULAR; INTRAVENOUS; SUBCUTANEOUS
Status: DISCONTINUED | OUTPATIENT
Start: 2024-05-26 | End: 2024-05-27

## 2024-05-26 RX ORDER — ECHINACEA PURPUREA EXTRACT 125 MG
1 TABLET ORAL
Status: DISCONTINUED | OUTPATIENT
Start: 2024-05-26 | End: 2024-05-27

## 2024-05-26 RX ORDER — ENOXAPARIN SODIUM 100 MG/ML
40 INJECTION SUBCUTANEOUS DAILY
Status: DISCONTINUED | OUTPATIENT
Start: 2024-05-26 | End: 2024-05-27

## 2024-05-26 RX ORDER — DEXTROSE MONOHYDRATE AND SODIUM CHLORIDE 5; .45 G/100ML; G/100ML
INJECTION, SOLUTION INTRAVENOUS ONCE
Status: DISCONTINUED | OUTPATIENT
Start: 2024-05-26 | End: 2024-05-27

## 2024-05-26 RX ORDER — FOLIC ACID 1 MG/1
1 TABLET ORAL DAILY
Status: DISCONTINUED | OUTPATIENT
Start: 2024-05-26 | End: 2024-05-27

## 2024-05-26 RX ORDER — DEXTROSE MONOHYDRATE AND SODIUM CHLORIDE 5; .45 G/100ML; G/100ML
INJECTION, SOLUTION INTRAVENOUS CONTINUOUS
Status: DISCONTINUED | OUTPATIENT
Start: 2024-05-26 | End: 2024-05-27

## 2024-05-26 RX ORDER — ONDANSETRON 2 MG/ML
4 INJECTION INTRAMUSCULAR; INTRAVENOUS EVERY 6 HOURS PRN
Status: DISCONTINUED | OUTPATIENT
Start: 2024-05-26 | End: 2024-05-27

## 2024-05-26 RX ORDER — CLOBETASOL PROPIONATE 0.5 MG/G
OINTMENT TOPICAL 2 TIMES DAILY
Status: DISCONTINUED | OUTPATIENT
Start: 2024-05-26 | End: 2024-05-27

## 2024-05-26 RX ORDER — LAMOTRIGINE 25 MG/1
50 TABLET ORAL 2 TIMES DAILY
Status: DISCONTINUED | OUTPATIENT
Start: 2024-05-26 | End: 2024-05-27

## 2024-05-26 RX ORDER — NICOTINE POLACRILEX 4 MG
15 LOZENGE BUCCAL
Status: DISCONTINUED | OUTPATIENT
Start: 2024-05-26 | End: 2024-05-27

## 2024-05-26 RX ORDER — MELATONIN
3 NIGHTLY PRN
Status: DISCONTINUED | OUTPATIENT
Start: 2024-05-26 | End: 2024-05-27

## 2024-05-26 RX ORDER — BISACODYL 10 MG
10 SUPPOSITORY, RECTAL RECTAL
Status: DISCONTINUED | OUTPATIENT
Start: 2024-05-26 | End: 2024-05-27

## 2024-05-26 RX ORDER — MORPHINE SULFATE 4 MG/ML
4 INJECTION, SOLUTION INTRAMUSCULAR; INTRAVENOUS ONCE
Status: COMPLETED | OUTPATIENT
Start: 2024-05-26 | End: 2024-05-26

## 2024-05-26 RX ORDER — POLYETHYLENE GLYCOL 3350 17 G/17G
17 POWDER, FOR SOLUTION ORAL DAILY PRN
Status: DISCONTINUED | OUTPATIENT
Start: 2024-05-26 | End: 2024-05-27

## 2024-05-26 RX ORDER — MORPHINE SULFATE 2 MG/ML
1 INJECTION, SOLUTION INTRAMUSCULAR; INTRAVENOUS EVERY 2 HOUR PRN
Status: DISCONTINUED | OUTPATIENT
Start: 2024-05-26 | End: 2024-05-27

## 2024-05-26 RX ORDER — ONDANSETRON 2 MG/ML
4 INJECTION INTRAMUSCULAR; INTRAVENOUS EVERY 4 HOURS PRN
Status: ACTIVE | OUTPATIENT
Start: 2024-05-26 | End: 2024-05-26

## 2024-05-26 RX ORDER — ENEMA 19; 7 G/133ML; G/133ML
1 ENEMA RECTAL ONCE AS NEEDED
Status: DISCONTINUED | OUTPATIENT
Start: 2024-05-26 | End: 2024-05-27

## 2024-05-26 RX ORDER — ACETAMINOPHEN 500 MG
500 TABLET ORAL EVERY 4 HOURS PRN
Status: DISCONTINUED | OUTPATIENT
Start: 2024-05-26 | End: 2024-05-27

## 2024-05-26 RX ORDER — DEXTROSE MONOHYDRATE AND SODIUM CHLORIDE 5; .45 G/100ML; G/100ML
INJECTION, SOLUTION INTRAVENOUS CONTINUOUS
Status: ACTIVE | OUTPATIENT
Start: 2024-05-26 | End: 2024-05-26

## 2024-05-26 NOTE — ED PROVIDER NOTES
Patient Seen in: [facility] Emergency Department    History     Chief Complaint   Patient presents with    Abdominal Pain     Stated Complaint: Abdominal Pain     HPI    53-year-old male with past medical history of diabetes, dyslipidemia, pancreatitis, remote alcohol use with 7/2022 EGD notable for gastric erythema and nonspecific pancreatic change with otherwise unclear etiology to prior pancreatitis presenting for evaluation with several days of upper abdominal pain associated nausea.  Symptoms center prior pancreatitis.  Denies alcohol use.  No sick contacts recent travel, no new food ingestions or recent antibiotics.    Past Medical History:    Depression    Diabetes (HCC)    Eczema    Hyperlipidemia    Pancreatitis (HCC)    Psoriasis       Past Surgical History:   Procedure Laterality Date    Colonoscopy screening - referral N/A 07/25/2022    Procedure: COLONOSCOPY-SCREENING/ ESOPHAGOGASTRODUODENOSCOPY (EGD)/ENDOSCOPIC ULTRASOUND with popssible Fine needle aspiration;  Surgeon: Eliezer Roth MD;  Location: [facility] ENDOSCOPY    Hernia surgery              Family History   Problem Relation Age of Onset    Diabetes Mother     Cancer Sister        Social History     Socioeconomic History    Marital status: Single   Tobacco Use    Smoking status: Light Smoker     Current packs/day: 0.00     Types: Cigarettes     Passive exposure: Current    Smokeless tobacco: Never    Tobacco comments:     3-4 a day    Vaping Use    Vaping status: Never Used   Substance and Sexual Activity    Alcohol use: Not Currently     Comment: sober for the past 6 days, was drinking 3-4, 24 oz beers every 3 days.    Drug use: Yes     Types: \"Crack\" cocaine     Comment: smokes crack once a week 40-60$ worth, last use was 12/13/21   Other Topics Concern    [place] up [place] a farm No    History of tanning No    Outdoor occupation No    Reaction to local anesthetic No    Caffeine Concern Yes    Exercise No    Pt has a pacemaker No    Pt has a  defibrillator No   Social History Narrative    The patient does not use an assistive device..      The patient does live in a home with stairs.     Social Determinants of Health     Financial Resource Strain: Low Risk  (2/17/2023)    Financial Resource Strain     Difficulty of Paying Living Expenses: Not hard at all     Med Affordability: No   Transportation Needs: No Transportation Needs (2/17/2023)    Transportation Needs     Lack of Transportation: No       Review of Systems :  Constitutional: As per HPI  Gastrointestinal: (+) nausea/abdominal pain.     Positive for stated complaint: Abdominal Pain  Other systems are as noted in HPI.  Constitutional and vital signs reviewed.      All other systems reviewed and negative except as noted above.    PSFH elements reviewed from today and agreed except as otherwise stated in HPI.    Physical Exam     ED Triage Vitals [05/26/24 1008]   /87   Pulse 84   Resp 18   Temp 98 °F (36.7 °C)   Temp src    SpO2 98 %   O2 Device        Current:/87   Pulse 84   Temp 98 °F (36.7 °C)   Resp 18   Wt 95.3 kg   SpO2 98%   BMI 29.29 kg/m²         Physical Exam   Constitutional: No distress.   HEENT: MMM.  Head: Normocephalic.   Eyes: No injection.   Cardiovascular: RRR.   Pulmonary/Chest: Effort normal. CTAB.  Abdominal: Soft. Mild upper abdominal tenderness without peritonitis.  Musculoskeletal: No gross deformity.  Neurological: Alert.   Skin: Skin is warm.   Psychiatric: Cooperative.  Nursing note and vitals reviewed.        ED Course     Labs Reviewed   COMP METABOLIC PANEL (14) - Abnormal; Notable for the following components:       Result Value    Glucose 259 (*)     BUN 8 (*)     BUN/CREA Ratio 6.5 (*)     All other components within normal limits   LIPASE - Abnormal; Notable for the following components:    Lipase 104 (*)     All other components within normal limits   TRIGLYCERIDES - Abnormal; Notable for the following components:    Triglycerides 251 (*)      All other components within normal limits   ETHYL ALCOHOL - Normal   CBC WITH DIFFERENTIAL WITH PLATELET    Narrative:     The following orders were created for panel order CBC With Differential With Platelet.  Procedure                               Abnormality         Status                     ---------                               -----------         ------                     CBC W/ DIFFERENTIAL[[id number]]                              Final result                 Please view results for these tests on the individual orders.   RAINBOW DRAW LAVENDER   RAINBOW DRAW LIGHT GREEN   RAINBOW DRAW BLUE   RAINBOW DRAW GOLD   CBC W/ DIFFERENTIAL     CT ABDOMEN+PELVIS(CONTRAST ONLY)(CPT=74177)    Result Date: 5/26/2024  PROCEDURE: CT ABDOMEN + PELVIS (CONTRAST ONLY) (CPT=74177)  COMPARISON: CT ABDOMEN PELVIS IV CONTRAST NO ORAL (ER), 10/03/2020, 4:37 AM.  MRI ABDOMEN&MRCP W/3D (W+WO)(CPT=74183/25951), 5/19/2021, 7:51 PM.  [facility], CT APPENDIX ABD PEL W CONTRAST (JIG=54321), 2/14/2023, 1:04 PM.  [facility], US ABDOMEN LIMITED (CPT=76705), 5/19/2021, 6:32 AM.  [facility], CT ABDOMEN + PELVIS (CONTRAST ONLY) (CPT=74177), 5/15/2021, 1:32 PM.  [facility], US ABDOMEN LIMITED (CPT=76705), 7/02/2020, 12:32 PM.  INDICATIONS: Generalized abdominal pain ongoing for several days.  TECHNIQUE: Multidetector CT images of the abdomen and pelvis were obtained with non-ionic intravenous contrast material. Automated exposure control for dose reduction was used. Adjustment of the mA and/or kV was done based on the patient's size. Iterative reconstruction technique for dose reduction was employed. Dose information was transmitted to the ACR (American College of Radiology) NRDR (National Radiology Data Registry), which includes the Dose Index Registry. Oral contrast was ingested.  FINDINGS: LUNG BASES: The heart is normal in size. There is dependent subsegmental atelectasis  bilaterally. Additional scattered ground-glass and reticular opacities are present and may be atelectatic in origin. LIVER: The hepatic parenchyma is diffusely hypoattenuating with areas of relative hyperdensity adjacent to the gallbladder fossa, consistent with hepatic steatosis and areas of fatty sparing. BILIARY: The gallbladder is present. PANCREAS: Mild peripancreatic infiltration is appreciated, particularly adjacent to the uncinate process, which is somewhat edematous in appearance. There is no evidence of main pancreatic ductal dilatation. Relatively homogeneous enhancement of the pancreas is noted.  No loculated peripancreatic fluid collection is appreciated. SPLEEN: No enlargement.  ADRENALS:   No defined mass or abnormal enlargement.  KIDNEYS:   Symmetric enhancement is seen without evidence of hydronephrosis or underlying solid masses. GI/MESENTERY:  Gastric distention is apparent. Scattered fluid-filled loops of small bowel are noted. There is no evidence of bowel obstruction. A normal caliber appendix is seen without inflammatory manifestations. A heavy stool burden is demonstrated.   URINARY BLADDER: No visible calculus. There is circumferential bladder wall thickening out of proportion to the degree of distention. A fibrous urachal remnant/median umbilical ligament extends from the bladder dome to the umbilicus. PELVIC NODES: No lymphadenopathy.   PELVIC ORGANS: No visible mass. Pelvic organs appropriate for patient age.  VASCULATURE:   No aneurysm is detected. RETROPERITONEUM: No mass or lymphadenopathy is apparent.  BONES:   Mild multilevel degenerative changes of the spine are apparent. ABDOMINAL WALL: There is a small fat-containing umbilical hernia. OTHER: No free air is seen in the abdomen or pelvis.           CONCLUSION:  1. Imaging findings concerning for mild acute interstitial edematous pancreatitis involving the uncinate process and pancreatic head. There is no loculated acute  peripancreatic fluid collection.  2. Possible hepatic steatosis.  3. Lesser incidental findings as above.    Dictated by (CST): Samir Valdovinos MD on 5/26/2024 at 12:44 PM     Finalized by (CST): Samir Valdovinos MD on 5/26/2024 at 12:50 PM                [facility]   DIFFERENTIAL DIAGNOSIS: After history and physical exam differential diagnosis includes but is not limited to gallstone pancreatitis, alcoholic pancreatitis, triglyceride pancreatitis, gastritis, perforated viscus.    Pulse ox: 98%:Normal on RA, as independently interpreted by myself    Medical Decision Making  Evaluation for upper abdominal pain associated nausea/vomiting started prior parotitis without clear inciting factor, labs with lipase as noted, CT with uncomplicated pancreatitis.  Pain initially resolved though with recurrence despite parenteral analgesia for which patient to be made for ongoing management.  Triglycerides/LFTs nonacute, case discussed with GI Dr. Tesfaye in consultation and primary care coverage Dr. Solomon for admission.    Problems Addressed:  Acute pancreatitis, unspecified complication status, unspecified pancreatitis type (HCC): acute illness or injury    Amount and/or Complexity of Data Reviewed  Labs: ordered. Decision-making details documented in ED Course.  Radiology: ordered and independent interpretation performed. Decision-making details documented in ED Course.     Details: CT abd/pelvis without obvious free air as independently interpreted by myself  Discussion of management or test interpretation with external provider(s): Case d/w GI Dr. Tesfaye and primary care coverage Dr. Solomon for admission    Risk  Prescription drug management.  Parenteral controlled substances.  Decision regarding hospitalization.        I was wearing at minimum a facemask and eye protection throughout this encounter with handwashing performed prior and after patient evaluation without personal hand/facial/oropharyngeal contact and gloves worn  throughout encounter. See note and/or contact this provider for further PPE details.    Disposition and Plan     Clinical Impression:  1. Acute pancreatitis, unspecified complication status, unspecified pancreatitis type (HCC)        Disposition:  Admit    Follow-up:  No follow-up provider specified.    Medications Prescribed:  Current Discharge Medication List

## 2024-05-26 NOTE — PLAN OF CARE
Problem: Patient Centered Care  Goal: Patient preferences are identified and integrated in the patient's plan of care  Description: Interventions:  - What would you like us to know as we care for you?   - Provide timely, complete, and accurate information to patient/family  - Incorporate patient and family knowledge, values, beliefs, and cultural backgrounds into the planning and delivery of care  - Encourage patient/family to participate in care and decision-making at the level they choose  - Honor patient and family perspectives and choices  Outcome: Progressing     Problem: Patient/Family Goals  Goal: Patient/Family Long Term Goal  Description: Patient's Long Term Goal: to return home    Interventions:  - pain management  -IV fluids  - See additional Care Plan goals for specific interventions  Outcome: Progressing  Goal: Patient/Family Short Term Goal  Description: Patient's Short Term Goal: to feel better    Interventions:   - follow MD recommendations  - See additional Care Plan goals for specific interventions  Outcome: Progressing     Problem: Diabetes/Glucose Control  Goal: Glucose maintained within prescribed range  Description: INTERVENTIONS:  - Monitor Blood Glucose as ordered  - Assess for signs and symptoms of hyperglycemia and hypoglycemia  - Administer ordered medications to maintain glucose within target range  - Assess barriers to adequate nutritional intake and initiate nutrition consult as needed  - Instruct patient on self management of diabetes  Outcome: Progressing

## 2024-05-27 VITALS
RESPIRATION RATE: 16 BRPM | HEART RATE: 76 BPM | BODY MASS INDEX: 29 KG/M2 | TEMPERATURE: 99 F | DIASTOLIC BLOOD PRESSURE: 80 MMHG | OXYGEN SATURATION: 95 % | WEIGHT: 210 LBS | SYSTOLIC BLOOD PRESSURE: 125 MMHG

## 2024-05-27 LAB
ALBUMIN SERPL-MCNC: 3.7 G/DL (ref 3.2–4.8)
ALBUMIN/GLOB SERPL: 1.3 {RATIO} (ref 1–2)
ALP LIVER SERPL-CCNC: 70 U/L
ALT SERPL-CCNC: 24 U/L
ANION GAP SERPL CALC-SCNC: 4 MMOL/L (ref 0–18)
AST SERPL-CCNC: 31 U/L (ref ?–34)
BASOPHILS # BLD AUTO: 0.05 X10(3) UL (ref 0–0.2)
BASOPHILS NFR BLD AUTO: 0.8 %
BILIRUB SERPL-MCNC: 0.4 MG/DL (ref 0.3–1.2)
BUN BLD-MCNC: 8 MG/DL (ref 9–23)
BUN/CREAT SERPL: 7.4 (ref 10–20)
CALCIUM BLD-MCNC: 8.9 MG/DL (ref 8.7–10.4)
CHLORIDE SERPL-SCNC: 108 MMOL/L (ref 98–112)
CO2 SERPL-SCNC: 27 MMOL/L (ref 21–32)
CREAT BLD-MCNC: 1.08 MG/DL
DEPRECATED RDW RBC AUTO: 36.6 FL (ref 35.1–46.3)
EGFRCR SERPLBLD CKD-EPI 2021: 83 ML/MIN/1.73M2 (ref 60–?)
EOSINOPHIL # BLD AUTO: 0.21 X10(3) UL (ref 0–0.7)
EOSINOPHIL NFR BLD AUTO: 3.4 %
ERYTHROCYTE [DISTWIDTH] IN BLOOD BY AUTOMATED COUNT: 11.7 % (ref 11–15)
GLOBULIN PLAS-MCNC: 2.8 G/DL (ref 2–3.5)
GLUCOSE BLD-MCNC: 197 MG/DL (ref 70–99)
GLUCOSE BLDC GLUCOMTR-MCNC: 175 MG/DL (ref 70–99)
HCT VFR BLD AUTO: 38.8 %
HGB BLD-MCNC: 13.5 G/DL
IMM GRANULOCYTES # BLD AUTO: 0.02 X10(3) UL (ref 0–1)
IMM GRANULOCYTES NFR BLD: 0.3 %
LYMPHOCYTES # BLD AUTO: 2.26 X10(3) UL (ref 1–4)
LYMPHOCYTES NFR BLD AUTO: 36.6 %
MAGNESIUM SERPL-MCNC: 1.7 MG/DL (ref 1.6–2.6)
MCH RBC QN AUTO: 30.1 PG (ref 26–34)
MCHC RBC AUTO-ENTMCNC: 34.8 G/DL (ref 31–37)
MCV RBC AUTO: 86.6 FL
MONOCYTES # BLD AUTO: 0.53 X10(3) UL (ref 0.1–1)
MONOCYTES NFR BLD AUTO: 8.6 %
NEUTROPHILS # BLD AUTO: 3.11 X10 (3) UL (ref 1.5–7.7)
NEUTROPHILS # BLD AUTO: 3.11 X10(3) UL (ref 1.5–7.7)
NEUTROPHILS NFR BLD AUTO: 50.3 %
OSMOLALITY SERPL CALC.SUM OF ELEC: 292 MOSM/KG (ref 275–295)
PHOSPHATE SERPL-MCNC: 3.5 MG/DL (ref 2.4–5.1)
PLATELET # BLD AUTO: 197 10(3)UL (ref 150–450)
POTASSIUM SERPL-SCNC: 3.9 MMOL/L (ref 3.5–5.1)
PROT SERPL-MCNC: 6.5 G/DL (ref 5.7–8.2)
RBC # BLD AUTO: 4.48 X10(6)UL
SODIUM SERPL-SCNC: 139 MMOL/L (ref 136–145)
WBC # BLD AUTO: 6.2 X10(3) UL (ref 4–11)

## 2024-05-27 PROCEDURE — 99235 HOSP IP/OBS SAME DATE MOD 70: CPT | Performed by: INTERNAL MEDICINE

## 2024-05-27 RX ORDER — OXYCODONE HYDROCHLORIDE AND ACETAMINOPHEN 5; 325 MG/1; MG/1
1 TABLET ORAL EVERY 6 HOURS PRN
Qty: 30 TABLET | Refills: 0 | Status: SHIPPED | OUTPATIENT
Start: 2024-05-27

## 2024-05-27 RX ORDER — MAGNESIUM OXIDE 400 MG/1
400 TABLET ORAL ONCE
Status: COMPLETED | OUTPATIENT
Start: 2024-05-27 | End: 2024-05-27

## 2024-05-27 NOTE — PLAN OF CARE
Patient alert and oriented. Plan to DC home this afternoon. IV removed. AVS reviewed with patient. No questions or concerns at this time. Patient sent home with paperwork and belongings. Patient escorted to entrance via nursing staff.   Problem: Patient Centered Care  Goal: Patient preferences are identified and integrated in the patient's plan of care  Description: Interventions:  - What would you like us to know as we care for you? Go home   - Provide timely, complete, and accurate information to patient/family  - Incorporate patient and family knowledge, values, beliefs, and cultural backgrounds into the planning and delivery of care  - Encourage patient/family to participate in care and decision-making at the level they choose  - Honor patient and family perspectives and choices  Outcome: Adequate for Discharge     Problem: Patient/Family Goals  Goal: Patient/Family Long Term Goal  Description: Patient's Long Term Goal: go home    Interventions:  - discharge planning  - GI consult   - See additional Care Plan goals for specific interventions  Outcome: Adequate for Discharge  Goal: Patient/Family Short Term Goal  Description: Patient's Short Term Goal: pain relief    Interventions:   - monitor and assess pain  -CLD  - See additional Care Plan goals for specific interventions  Outcome: Adequate for Discharge     Problem: Diabetes/Glucose Control  Goal: Glucose maintained within prescribed range  Description: INTERVENTIONS:  - Monitor Blood Glucose as ordered  - Assess for signs and symptoms of hyperglycemia and hypoglycemia  - Administer ordered medications to maintain glucose within target range  - Assess barriers to adequate nutritional intake and initiate nutrition consult as needed  - Instruct patient on self management of diabetes  Outcome: Adequate for Discharge

## 2024-05-27 NOTE — H&P
[facility]  part of [facility]    History & Physical    Patel Lechuga Patient Status:  Inpatient    9/15/1970 MRN [id number]   Location [facility] 5SW/SE Attending Kingsley Solomon MD   Hosp Day # 1 PCP Aram Ann MD     Date:  2024  Date of Admission:  2024    History provided by:patient and chart review and discussion with medical staff  Chief Complaint:     Chief Complaint   Patient presents with    Abdominal Pain       HPI:   Patel Lechuga is a(n) 53 year old pleasant gentleman with past medical history of remote alcohol use, episodes of acute pancreatitis, type II DM, dyslipidemia who presented to the emergency room with abdominal pain    Patient presented to the emergency room 2024 with epigastric abdominal pain of several day duration.  Patient reported that pain resembled his pancreatitis attack symptoms.  Reported nausea but no vomiting.  Denies alcohol use.\    In the emergency room vital signs were unremarkable.  Triglycerides 251, lipase 104, CT scan suggestive of pancreatitis.  GI service who knows the patient was consulted and the patient was admitted for management with IV fluids and pain medications.    Patient has been tolerating clear liquid diet without issues.  Reports that his symptoms are much better today.  Reports that pain is well-controlled with IV morphine.  Patient wishes to be discharged today with oral pain medications.        History     Past Medical History:    Depression    Diabetes (HCC)    Eczema    Hyperlipidemia    Pancreatitis (HCC)    Psoriasis     Past Surgical History:   Procedure Laterality Date    Colonoscopy screening - referral N/A 2022    Procedure: COLONOSCOPY-SCREENING/ ESOPHAGOGASTRODUODENOSCOPY (EGD)/ENDOSCOPIC ULTRASOUND with popssible Fine needle aspiration;  Surgeon: Eliezer Roth MD;  Location: [facility] ENDOSCOPY    Hernia surgery       Family History   Problem Relation Age of Onset    Diabetes Mother      Cancer Sister      Social History     Socioeconomic History    Marital status: Single   Tobacco Use    Smoking status: Light Smoker     Current packs/day: 0.00     Types: Cigarettes     Passive exposure: Current    Smokeless tobacco: Never    Tobacco comments:     3-4 a day    Vaping Use    Vaping status: Never Used   Substance and Sexual Activity    Alcohol use: Not Currently     Comment: sober for the past 6 days, was drinking 3-4, 24 oz beers every 3 days.    Drug use: Yes     Types: \"Crack\" cocaine     Comment: smokes crack once a week 40-60$ worth, last use was 12/13/21   Other Topics Concern    Grew up on a farm No    History of tanning No    Outdoor occupation No    Reaction to local anesthetic No    Caffeine Concern Yes    Exercise No    Pt has a pacemaker No    Pt has a defibrillator No   Social History Narrative    The patient does not use an assistive device..      The patient does live in a home with stairs.     Social Determinants of Health     Financial Resource Strain: Low Risk  (2/17/2023)    Financial Resource Strain     Difficulty of Paying Living Expenses: Not hard at all     Med Affordability: No   Food Insecurity: No Food Insecurity (5/26/2024)    Food Insecurity     Food Insecurity: Never true   Transportation Needs: No Transportation Needs (5/26/2024)    Transportation Needs     Lack of Transportation: No   Housing Stability: Low Risk  (5/26/2024)    Housing Stability     Housing Instability: No       Allergies/Medications:   Allergies:   Allergies   Allergen Reactions    Cat Hair Extract ITCHING    Dander ITCHING      Cat dander: Watery eyes, sneezing     Medications Prior to Admission   Medication Sig    folic acid 1 MG Oral Tab Take 1 tablet (1 mg total) by mouth daily.    glipiZIDE ER 2.5 MG Oral Tablet 24 Hr Take 1 tablet (2.5 mg total) by mouth daily with breakfast.    OMEPRAZOLE 40 MG Oral Capsule Delayed Release TAKE 1 CAPSULE BY MOUTH DAILY    METFORMIN HCL 1000 MG Oral Tab TAKE 1  TABLET BY MOUTH TWICE  DAILY WITH MEALS    lamoTRIgine 25 MG Oral Tab Take 2 tablets (50 mg total) by mouth 2 (two) times daily.    PARoxetine 20 MG Oral Tab Take 1 tablet (20 mg total) by mouth at bedtime.    Sildenafil Citrate 100 MG Oral Tab Take 1 tablet (100 mg total) by mouth daily as needed for Erectile Dysfunction.    Risankizumab-rzaa (SKYRIZI PEN) 150 MG/ML Subcutaneous Solution Auto-injector Inject 150 mg into the skin every 3 (three) months.    clobetasol 0.05 % External Ointment Apply twice daily  Monday-Friday. Take weekends off. Use on hands and elbows.    Continuous Blood Gluc  (FREESTYLE ROBERT 2 READER) Does not apply Device 1 Device daily.    Continuous Blood Gluc Sensor (FREESTYLE ROBERT 2 SENSOR) Does not apply Misc 1 each every 14 (fourteen) days.    Glucose Blood Automatic In Vitro Test 1 Device by In Vitro route in the morning and 1 Device before bedtime. check BS once a day  E11.9 non insulin.    Blood Glucose Monitoring Suppl (Betify AUTOMATIC BLOOD GLUCOSE) Does not apply Device 1 Device daily. Pt checks BS 1 daily E11.9    COSENTYX SENSOREADY, 300 MG, 150 MG/ML Subcutaneous Solution Auto-injector     gabapentin 100 MG Oral Cap Take 1 capsule (100 mg total) by mouth 3 (three) times daily.       Review of Systems:   Review of Systems   Constitutional: Negative.    HENT: Negative.     Eyes: Negative.    Respiratory: Negative.     Cardiovascular: Negative.    Gastrointestinal:  Positive for abdominal pain (mild).   Genitourinary: Negative.    Musculoskeletal: Negative.    Skin: Negative.    Neurological: Negative.    Endo/Heme/Allergies: Negative.    Psychiatric/Behavioral: Negative.           Physical Exam:   Vital Signs:  Blood pressure 125/80, pulse 76, temperature 98.5 °F (36.9 °C), temperature source Oral, resp. rate 16, weight 210 lb (95.3 kg), SpO2 95%.     Physical Exam  Constitutional:       Appearance: Normal appearance.   HENT:      Head: Normocephalic and atraumatic.   Eyes:       General:         Right eye: No discharge.         Left eye: No discharge.   Cardiovascular:      Rate and Rhythm: Normal rate and regular rhythm.      Heart sounds: No murmur heard.     No friction rub. No gallop.   Abdominal:      General: There is no distension.      Palpations: Abdomen is soft.      Tenderness: There is no abdominal tenderness. There is no guarding.   Musculoskeletal:      Right lower leg: No edema.      Left lower leg: No edema.   Skin:     General: Skin is warm and dry.   Neurological:      General: No focal deficit present.      Mental Status: He is alert and oriented to person, place, and time.   Psychiatric:         Mood and Affect: Mood normal.         Behavior: Behavior normal.         Thought Content: Thought content normal.         Judgment: Judgment normal.               Results:     Lab Results   Component Value Date    WBC 6.2 05/27/2024    HGB 13.5 05/27/2024    HCT 38.8 (L) 05/27/2024    .0 05/27/2024    CREATSERUM 1.08 05/27/2024    BUN 8 (L) 05/27/2024     05/27/2024    K 3.9 05/27/2024     05/27/2024    CO2 27.0 05/27/2024     (H) 05/27/2024    CA 8.9 05/27/2024    ALB 3.7 05/27/2024    ALKPHO 70 05/27/2024    BILT 0.4 05/27/2024    TP 6.5 05/27/2024    AST 31 05/27/2024    ALT 24 05/27/2024    TSH 2.63 01/13/2019     (H) 05/26/2024    MG 1.7 05/27/2024    PHOS 3.5 05/27/2024    TROP <0.045 10/03/2020     10/03/2020    ETOH <3 05/26/2024       CT ABDOMEN+PELVIS(CONTRAST ONLY)(CPT=74177)    Result Date: 5/26/2024  CONCLUSION:  1. Imaging findings concerning for mild acute interstitial edematous pancreatitis involving the uncinate process and pancreatic head. There is no loculated acute peripancreatic fluid collection.  2. Possible hepatic steatosis.  3. Lesser incidental findings as above.    Dictated by (CST): Samir Valdovinos MD on 5/26/2024 at 12:44 PM     Finalized by (CST): Samir Valdovinos MD on 5/26/2024 at 12:50 PM                Assessment/Plan:     Acute pancreatitis, unspecified complication status, unspecified pancreatitis type (HCC)  -Symptoms improved with IV fluids and IV pain medications  -Patient known to GI service.  They cleared him for discharge given that his symptoms have improved on clear liquid diet.  Patient will advance his diet as tolerated at home      Type 2 diabetes mellitus without complication, without long-term current use of insulin ([facility])  -Was treated with sliding scale insulin in the hospital.  Resume home medications on discharge        Major depression  -Continue home meds          Kingsley Solomon MD  5/27/2024

## 2024-05-27 NOTE — CONSULTS
[facility]  Report of GI Consultation    Patel Lechuga Patient Status:  Inpatient    9/15/1970 MRN [id number]   Location [facility] 5SW/SE Attending Kingsley Solomon MD   Hosp Day # 0 PCP Aram Ann MD     Date of Admission:  2024  Date of Consult:  2024  PCP: Aram Ann MD    Reason for Consultation:   Recurrent acute pancreatitis    History of Present Illness:     Patel Lechuga is a 53 year old man with Body mass index is 29.29 kg/m². with history of diabetes, dyslipidemia, previous alcohol abuse who is known to our group from previous consultations regarding acute pancreatitis here going back to 2020 who presents to the ED today    Previous evaluations were significant for previous concern for prolonged alcohol abuse, previous tobacco smoking, acute onset of pancreatitis symptoms; multiple reassuring serum triglyceride levels past 5 years.    Mr. Lechuga presented to the ED 10 AM this morning complaining of several days of abdominal pain suggestive of his previous pancreatitis symptoms.    Evaluation in the ED and since admission has been reassuring with him afebrile; HR 60s-70s; normotensive.    Labs today show WBC 6800, AST 34 ALT 28 alk phosphatase 84 serum lipase 104 [normal 13-75]    Hemoglobin A1c 8.0%    Last CT scan here was 2023.      IV contrast CT scan today 2024 shows:  Hepatic steatosis  Gallbladder is present  \"PANCREAS: Mild peripancreatic infiltration is appreciated, particularly adjacent to the uncinate process, which is somewhat edematous in appearance.\"  No evidence for necrosis.  No peripancreatic fluid collection.    Administered 2 doses morphine 4 mg first 4 hours here in the ED; last dose over 4 hours ago.    On assessment this evening, Mr. Lechuga looks well.  Though this episode was a bit different from previous pancreatitis episodes merrily and its location , overall reminiscent of previous episodes and tolerable so far.   He is tolerating clear liquids and is optimistic that he will improve tonight.    Continues a mild tobacco habit.  Remains sober and an active participant in AA.      Endoscopy history:    EGD/EUS 7/25/2022:  Normal esophagus stomach duodenum  Limited exam of pancreas consistent with mild pancreatitis    Colonoscopy examination 7/25/2022:  Screening colonoscopy examination  2 colon polyps 7 mm and 12 mm found and removed      Wt Readings from Last 20 Encounters:   05/26/24 210 lb (95.3 kg)   07/31/23 207 lb (93.9 kg)   03/22/23 205 lb (93 kg)   02/27/23 208 lb (94.3 kg)   02/14/23 197 lb 1.6 oz (89.4 kg)   02/14/23 208 lb (94.3 kg)   02/08/23 209 lb (94.8 kg)   12/02/22 200 lb (90.7 kg)   10/21/22 202 lb (91.6 kg)   08/02/22 202 lb (91.6 kg)   07/25/22 200 lb (90.7 kg)   07/13/22 203 lb (92.1 kg)   07/12/22 203 lb (92.1 kg)   06/22/22 206 lb (93.4 kg)   04/26/22 208 lb (94.3 kg)   12/20/21 200 lb (90.7 kg)   09/13/21 200 lb (90.7 kg)   08/18/21 203 lb (92.1 kg)   08/16/21 203 lb (92.1 kg)   05/19/21 218 lb 4.8 oz (99 kg)       Pertinent Social History: As above, still some tobacco smoking    Impression:     53-year-old gentleman with BMI 29.3, previous history alcohol abuse, tobacco smoking who presents with mild acute recurrent interstitial pancreatitis on imaging.  Symptoms began several days prior to admission.  No peripancreatic fluid or suggestion of pancreatic necrosis on today's IV contrast CT scan.    Reassuring interview exam and vitals today.    Recommendations:    Lactated Ringer's IV fluid support  Bowel rest, ice chips/clear liquids as tolerated  No antibiotics  Lovenox prophylaxis has been ordered  Tobacco cessation discussed.  Unclear if vaping would be a better idea.      Thank you for allowing me to participate in the care of your patient.    Gerry Tesfaye MD  5/26/2024          Over 50 minutes spent today reviewing today's and recent data; greater than 50% of that time was spent  counseling the patient and coordination of care with RN and other involved physicians.      Digital transcription software was utilized to produce this note. The note was proofread for content only. Typographical errors may remain.            Past Medical History  Past Medical History:    Depression    Diabetes (HCC)    Eczema    Hyperlipidemia    Pancreatitis (HCC)    Psoriasis       Past Surgical History  Past Surgical History:   Procedure Laterality Date    Colonoscopy screening - referral N/A 07/25/2022    Procedure: COLONOSCOPY-SCREENING/ ESOPHAGOGASTRODUODENOSCOPY (EGD)/ENDOSCOPIC ULTRASOUND with popssible Fine needle aspiration;  Surgeon: Eliezer Roth MD;  Location: [facility] ENDOSCOPY    Hernia surgery         Family History  Family History   Problem Relation Age of Onset    Diabetes Mother     Cancer Sister        Social History  Pediatric History   Patient Parents    Not on file     Other Topics Concern    Grew up on a farm No    History of tanning No    Outdoor occupation No    Reaction to local anesthetic No     Service Not Asked    Blood Transfusions Not Asked    Caffeine Concern Yes    Occupational Exposure Not Asked    Hobby Hazards Not Asked    Sleep Concern Not Asked    Stress Concern Not Asked    Weight Concern Not Asked    Special Diet Not Asked    Back Care Not Asked    Exercise No    Bike Helmet Not Asked    Seat Belt Not Asked    Self-Exams Not Asked    Pt has a pacemaker No    Pt has a defibrillator No   Social History Narrative    The patient does not use an assistive device..      The patient does live in a home with stairs.           Current Medications:  Current Facility-Administered Medications   Medication Dose Route Frequency    dextrose 5%-sodium chloride 0.45% infusion   Intravenous Once    clobetasol (Temovate) 0.05 % ointment   Topical BID    folic acid (Folvite) tab 1 mg  1 mg Oral Daily    glucose (Dex4) 15 GM/59ML oral liquid 15 g  15 g Oral Q15 Min PRN    Or    glucose  (Glutose) 40% oral gel 15 g  15 g Oral Q15 Min PRN    Or    glucose-vitamin C (Dex-4) chewable tab 4 tablet  4 tablet Oral Q15 Min PRN    Or    dextrose 50% injection 50 mL  50 mL Intravenous Q15 Min PRN    Or    glucose (Dex4) 15 GM/59ML oral liquid 30 g  30 g Oral Q15 Min PRN    Or    glucose (Glutose) 40% oral gel 30 g  30 g Oral Q15 Min PRN    Or    glucose-vitamin C (Dex-4) chewable tab 8 tablet  8 tablet Oral Q15 Min PRN    lamoTRIgine (LaMICtal) tab 50 mg  50 mg Oral BID    [START ON 5/27/2024] pantoprazole (Protonix) DR tab 40 mg  40 mg Oral QAM AC    PARoxetine (Paxil) tab 20 mg  20 mg Oral Nightly    insulin aspart (NovoLOG) 100 Units/mL FlexPen 1-11 Units  1-11 Units Subcutaneous TID CC    morphINE PF 2 MG/ML injection 1 mg  1 mg Intravenous Q2H PRN    Or    morphINE PF 2 MG/ML injection 2 mg  2 mg Intravenous Q2H PRN    Or    morphINE PF 4 MG/ML injection 4 mg  4 mg Intravenous Q2H PRN    naloxone (Narcan) 0.4 MG/ML injection 0.08 mg  0.08 mg Intravenous Q5 Min PRN    enoxaparin (Lovenox) 40 MG/0.4ML SUBQ injection 40 mg  40 mg Subcutaneous Daily    acetaminophen (Tylenol Extra Strength) tab 500 mg  500 mg Oral Q4H PRN    melatonin tab 3 mg  3 mg Oral Nightly PRN    polyethylene glycol (PEG 3350) (Miralax) 17 g oral packet 17 g  17 g Oral Daily PRN    sennosides (Senokot) tab 17.2 mg  17.2 mg Oral Nightly PRN    bisacodyl (Dulcolax) 10 MG rectal suppository 10 mg  10 mg Rectal Daily PRN    fleet enema (Fleet) 7-19 GM/118ML rectal enema 133 mL  1 enema Rectal Once PRN    ondansetron (Zofran) 4 MG/2ML injection 4 mg  4 mg Intravenous Q6H PRN    prochlorperazine (Compazine) 10 MG/2ML injection 5 mg  5 mg Intravenous Q8H PRN    guaiFENesin (Robitussin) 100 MG/5 ML oral liquid 200 mg  200 mg Oral Q4H PRN    glycerin-hypromellose- (Artificial Tears) 0.2-0.2-1 % ophthalmic solution 1 drop  1 drop Both Eyes QID PRN    sodium chloride (Saline Mist) 0.65 % nasal solution 1 spray  1 spray Each Nare Q3H PRN     dextrose 5%-sodium chloride 0.45% infusion   Intravenous Continuous     Medications Prior to Admission   Medication Sig    folic acid 1 MG Oral Tab Take 1 tablet (1 mg total) by mouth daily.    glipiZIDE ER 2.5 MG Oral Tablet 24 Hr Take 1 tablet (2.5 mg total) by mouth daily with breakfast.    OMEPRAZOLE 40 MG Oral Capsule Delayed Release TAKE 1 CAPSULE BY MOUTH DAILY    METFORMIN HCL 1000 MG Oral Tab TAKE 1 TABLET BY MOUTH TWICE  DAILY WITH MEALS    lamoTRIgine 25 MG Oral Tab Take 2 tablets (50 mg total) by mouth 2 (two) times daily.    PARoxetine 20 MG Oral Tab Take 1 tablet (20 mg total) by mouth at bedtime.    Sildenafil Citrate 100 MG Oral Tab Take 1 tablet (100 mg total) by mouth daily as needed for Erectile Dysfunction.    Risankizumab-rzaa (SKYRIZI PEN) 150 MG/ML Subcutaneous Solution Auto-injector Inject 150 mg into the skin every 3 (three) months.    clobetasol 0.05 % External Ointment Apply twice daily  Monday-Friday. Take weekends off. Use on hands and elbows.    Continuous Blood Gluc  (FREESTYLE ROBERT 2 READER) Does not apply Device 1 Device daily.    Continuous Blood Gluc Sensor (FREESTYLE ROBERT 2 SENSOR) Does not apply Misc 1 each every 14 (fourteen) days.    Glucose Blood Automatic In Vitro Test 1 Device by In Vitro route in the morning and 1 Device before bedtime. check BS once a day  E11.9 non insulin.    Blood Glucose Monitoring Suppl (POGO AUTOMATIC BLOOD GLUCOSE) Does not apply Device 1 Device daily. Pt checks BS 1 daily E11.9    COSENTYX SENSOREADY, 300 MG, 150 MG/ML Subcutaneous Solution Auto-injector     gabapentin 100 MG Oral Cap Take 1 capsule (100 mg total) by mouth 3 (three) times daily.       Allergies  Allergies   Allergen Reactions    Cat Hair Extract ITCHING    Dander ITCHING      Cat dander: Watery eyes, sneezing             Review of Systems:     No fevers.  Nausea but no vomiting.  12 point review of systems as above otherwise negative.    Physical Exam:   Blood pressure  127/82, pulse 64, temperature 97.4 °F (36.3 °C), temperature source Oral, resp. rate 18, weight 210 lb (95.3 kg), SpO2 96%.    GENERAL: Fit healthy appearing gentleman lying in bed  HEENT: Normocephalic; pupils equally round and reactive to light; no temporal wasting; no thyromegaly or cervical lymphadenopathy  PULM: Breathing and speaking comfortably  CV: RRR not tachycardic  ABD: soft/nondistended; mild epigastric and diffuse upper abdominal tenderness  EXT: No edema  SKIN: No rash    Results:     Laboratory Data:  Lab Results   Component Value Date    WBC 6.8 05/26/2024    HGB 15.5 05/26/2024    .0 05/26/2024    CREATSERUM 1.24 05/26/2024    BUN 8 (L) 05/26/2024     05/26/2024    K 4.5 05/26/2024    CO2 27.0 05/26/2024    CA 9.4 05/26/2024    ALB 4.5 05/26/2024    ALKPHO 84 05/26/2024    TP 7.8 05/26/2024    AST 34 05/26/2024    ALT 28 05/26/2024    BILT 0.4 05/26/2024    TSH 2.63 01/13/2019     (H) 05/26/2024    TROP <0.045 10/03/2020    ETOH <3 05/26/2024           No results for input(s): \"URINE\", \"CULTI\", \"BLDSMR\" in the last 168 hours.    Recent Labs   Lab 05/26/24  1034   ALT 28   AST 34   ALKPHO 84   BILT 0.4   HGB 15.5   WBC 6.8       Imaging:  CT ABDOMEN+PELVIS(CONTRAST ONLY)(CPT=74177)    Result Date: 5/26/2024  CONCLUSION:  1. Imaging findings concerning for mild acute interstitial edematous pancreatitis involving the uncinate process and pancreatic head. There is no loculated acute peripancreatic fluid collection.  2. Possible hepatic steatosis.  3. Lesser incidental findings as above.    Dictated by (CST): Samir Valdovinos MD on 5/26/2024 at 12:44 PM     Finalized by (CST): Samir Valdovinos MD on 5/26/2024 at 12:50 PM

## 2024-05-27 NOTE — PLAN OF CARE
Patient vital signs stable. IV fluids running. PRN Morphine given for pain. On clear liquid diet, tolerating fine overnight. Self in room. No acute changes.    Problem: Patient Centered Care  Goal: Patient preferences are identified and integrated in the patient's plan of care  Description: Interventions:  - What would you like us to know as we care for you? From home alone.  - Provide timely, complete, and accurate information to patient/family  - Incorporate patient and family knowledge, values, beliefs, and cultural backgrounds into the planning and delivery of care  - Encourage patient/family to participate in care and decision-making at the level they choose  - Honor patient and family perspectives and choices  Outcome: Progressing     Problem: Diabetes/Glucose Control  Goal: Glucose maintained within prescribed range  Description: INTERVENTIONS:  - Monitor Blood Glucose as ordered  - Assess for signs and symptoms of hyperglycemia and hypoglycemia  - Administer ordered medications to maintain glucose within target range  - Assess barriers to adequate nutritional intake and initiate nutrition consult as needed  - Instruct patient on self management of diabetes  Outcome: Progressing

## 2024-05-27 NOTE — DISCHARGE SUMMARY
[facility]  Hospitalist Discharge Summary    Patel Lechuga Patient Status:  Inpatient    9/15/1970 MRN [id number]   Location [facility] 5SW/SE Attending Kingsley Solomon MD   Hosp Day # 1 PCP Aram Ann MD     Date of Admission: 2024  Date of Discharge: 24   Discharge Disposition: Home or Self Care    Admitting Diagnosis:   Acute pancreatitis, unspecified complication status, unspecified pancreatitis type (HCC) [K85.90]    Hospital Discharge Diagnoses:  Acute pancreatitis, type II DM    Lace+ Score: 41  59-90 High Risk  29-58 Medium Risk  0-28   Low Risk.    TCM Follow-Up Recommendation:  LACE 29-58: Moderate Risk of readmission after discharge from the hospital.    Risk of readmission: Patel Lechuga has Moderate Risk of readmission after discharge from the hospital.    Discharge Diagnosis:   Acute pancreatitis, type II DM    History of Present Illness:   Patel Lechuga is a(n) 53 year old pleasant gentleman with past medical history of remote alcohol use, episodes of acute pancreatitis, type II DM, dyslipidemia who presented to the emergency room with abdominal pain     Patient presented to the emergency room 2024 with epigastric abdominal pain of several day duration.  Patient reported that pain resembled his pancreatitis attack symptoms.  Reported nausea but no vomiting.  Denies alcohol use.\     In the emergency room vital signs were unremarkable.  Triglycerides 251, lipase 104, CT scan suggestive of pancreatitis.  GI service who knows the patient was consulted and the patient was admitted for management with IV fluids and pain medications.    Brief Synopsis:   Patient has been tolerating clear liquid diet without issues.  Reports that his symptoms are much better today.  Reports that pain is well-controlled with IV morphine.  Patient wishes to be discharged today with oral pain medications.     Acute pancreatitis, unspecified complication status, unspecified pancreatitis  type (HCC)  -Symptoms improved with IV fluids and IV pain medications  -Patient known to GI service.  They cleared him for discharge given that his symptoms have improved on clear liquid diet.  Patient will advance his diet as tolerated at home       Type 2 diabetes mellitus without complication, without long-term current use of insulin (HCC)  -Was treated with sliding scale insulin in the hospital.  Resume home medications on discharge          Major depression  -Continue home meds    Procedures during hospitalization:   N/A    Incidental or significant findings and recommendations (brief descriptions):  N/A    Lab/Test results pending at Discharge:   N/A    Consultants:  Gerry Tesfaye MD-gastroenterology    Discharge Medication List:     Discharge Medications        START taking these medications        Instructions Prescription details   oxyCODONE-acetaminophen 5-325 MG Tabs  Commonly known as: Percocet      Take 1 tablet by mouth every 6 (six) hours as needed for Pain.   Quantity: 30 tablet  Refills: 0            CONTINUE taking these medications        Instructions Prescription details   clobetasol 0.05 % Oint  Commonly known as: Temovate      Apply twice daily  Monday-Friday. Take weekends off. Use on hands and elbows.   Quantity: 45 g  Refills: 2     folic acid 1 MG Tabs  Commonly known as: Folvite      Take 1 tablet (1 mg total) by mouth daily.   Quantity: 90 tablet  Refills: 3     FreeStyle Keshav 2 [place] Candace      1 Device daily.   Quantity: 1 each  Refills: 0     FreeStyle Keshav 2 Sensor Misc      1 each every 14 (fourteen) days.   Quantity: 2 each  Refills: 1     glipiZIDE ER 2.5 MG Tb24  Commonly known as: Glucotrol XL      Take 1 tablet (2.5 mg total) by mouth daily with breakfast.   Quantity: 90 tablet  Refills: 0     lamoTRIgine 25 MG Tabs  Commonly known as: LaMICtal      Take 2 tablets (50 mg total) by mouth 2 (two) times daily.   Refills: 0     metFORMIN HCl 1000 MG Tabs  Commonly  known as: GLUCOPHAGE      TAKE 1 TABLET BY MOUTH TWICE  DAILY WITH MEALS   Quantity: 180 tablet  Refills: 3     Omeprazole 40 MG Cpdr      TAKE 1 CAPSULE BY MOUTH DAILY   Quantity: 90 capsule  Refills: 3     PARoxetine 20 MG Tabs  Commonly known as: Paxil      Take 1 tablet (20 mg total) by mouth at bedtime.   Refills: 0     POGO Automatic Blood Glucose Candace      1 Device daily. Pt checks BS 1 daily E11.9   Quantity: 1 each  Refills: 0     Sildenafil Citrate 100 MG Tabs  Commonly known as: VIAGRA      Take 1 tablet (100 mg total) by mouth daily as needed for Erectile Dysfunction.   Quantity: 18 tablet  Refills: 1     Skyrizi Pen 150 MG/ML Soaj  Generic drug: Risankizumab-rzaa      Inject 150 mg into the skin every 3 (three) months.   Quantity: 1 mL  Refills: 1            STOP taking these medications      Cosentyx Sensoready (300 MG) 150 MG/ML Soaj  Generic drug: Secukinumab (300 MG Dose)        gabapentin 100 MG Caps  Commonly known as: Neurontin        Glucose Blood Automatic Test                  Where to Get Your Medications        These medications were sent to [facility]/pharmacy #[id number] - [place], [place] - [address] AT [facility], [phone], [phone]  [facility] [address], [place] [address]      Phone: [phone]   oxyCODONE-acetaminophen 5-325 MG Tabs         Follow-up appointment:   No follow-up provider specified.    Vital signs:  Temp:  [97.4 °F (36.3 °C)-98.5 °F (36.9 °C)] 98.5 °F (36.9 °C)  Pulse:  [64-77] 76  Resp:  [16-20] 16  BP: (104-130)/(66-86) 125/80  SpO2:  [92 %-97 %] 95 %    Physical Exam:    General: No acute distress.   Respiratory: Clear to auscultation bilaterally. No wheezes. No rhonchi.  Cardiovascular: S1, S2. Regular rate and rhythm. No murmurs, rubs or gallops.   Abdomen: Soft, nontender, nondistended.  Positive bowel sounds. No rebound or guarding.  Neurologic: No focal neurological deficits.   Musculoskeletal: Moves all extremities.  Extremities: No  edema.  -----------------------------------------------------------------------------------------------  PATIENT DISCHARGE INSTRUCTIONS: See electronic chart    Kingsley Solomon MD 5/27/2024    Time spent:  > 30 minutes

## 2024-05-28 NOTE — PAYOR COMM NOTE
--------------  ADMISSION REVIEW     Payor: [employer]/HMO/POS/EPO  Subscriber #:  [id number]  Authorization Number: [id number]    Admit date: 5/26/24  Admit time:  3:13 PM       REVIEW DOCUMENTATION:    ED Provider Notes signed by Adolfo Campbell MD at 5/26/2024  2:57 PM      Patient Seen in: [facility] Emergency Department    History     Chief Complaint   Patient presents with    Abdominal Pain     Stated Complaint: Abdominal Pain     HPI    53-year-old male with past medical history of diabetes, dyslipidemia, pancreatitis, remote alcohol use with 7/2022 EGD notable for gastric erythema and nonspecific pancreatic change with otherwise unclear etiology to prior pancreatitis presenting for evaluation with several days of upper abdominal pain associated nausea.  Symptoms center prior pancreatitis.  Denies alcohol use.  No sick contacts recent travel, no new food ingestions or recent antibiotics.    Past Medical History:    Depression    Diabetes (HCC)    Eczema    Hyperlipidemia    Pancreatitis (HCC)    Psoriasis   Review of Systems :  Constitutional: As per HPI  Gastrointestinal: (+) nausea/abdominal pain.     Positive for stated complaint: Abdominal Pain  Other systems are as noted in HPI.  Constitutional and vital signs reviewed.      All other systems reviewed and negative except as noted above.    PSFH elements reviewed from today and agreed except as otherwise stated in HPI.    Physical Exam     ED Triage Vitals [05/26/24 1008]   /87   Pulse 84   Resp 18   Temp 98 °F (36.7 °C)   Temp src    SpO2 98 %   O2 Device        Current:/87   Pulse 84   Temp 98 °F (36.7 °C)   Resp 18   Wt 95.3 kg   SpO2 98%   BMI 29.29 kg/m²     Physical Exam   Constitutional: No distress.   HEENT: MMM.  Head: Normocephalic.   Eyes: No injection.   Cardiovascular: RRR.   Pulmonary/Chest: Effort normal. CTAB.  Abdominal: Soft. Mild upper abdominal tenderness without peritonitis.  Musculoskeletal: No  gross deformity.  Neurological: Alert.   Skin: Skin is warm.   Psychiatric: Cooperative.  Nursing note and vitals reviewed.    Labs Reviewed   COMP METABOLIC PANEL (14) - Abnormal; Notable for the following components:       Result Value    Glucose 259 (*)     BUN 8 (*)     BUN/CREA Ratio 6.5 (*)     All other components within normal limits   LIPASE - Abnormal; Notable for the following components:    Lipase 104 (*)     All other components within normal limits   TRIGLYCERIDES - Abnormal; Notable for the following components:    Triglycerides 251 (*)     All other components within normal limits   ETHYL ALCOHOL - Normal   CBC WITH DIFFERENTIAL WITH PLATELET    Narrative:     The following orders were created for panel order CBC With Differential With Platelet.  Procedure                               Abnormality         Status                     ---------                               -----------         ------                     CBC W/ DIFFERENTIAL[[id number]]                              Final result                 Please view results for these tests on the individual orders.   RAINBOW DRAW LAVENDER   RAINBOW DRAW LIGHT GREEN   RAINBOW DRAW BLUE   RAINBOW DRAW GOLD   CBC W/ DIFFERENTIAL     CT ABDOMEN+PELVIS(CONTRAST ONLY)(CPT=74177)    Result Date: 5/26/2024  CONCLUSION:  1. Imaging findings concerning for mild acute interstitial edematous pancreatitis involving the uncinate process and pancreatic head. There is no loculated acute peripancreatic fluid collection.  2. Possible hepatic steatosis.  3. Lesser incidental findings as above.    Dictated by (CST): Samir Valdovinos MD on 5/26/2024 at 12:44 PM     Finalized by (CST): Samir Valdovinos MD on 5/26/2024 at 12:50 PM           MDM   DIFFERENTIAL DIAGNOSIS: After history and physical exam differential diagnosis includes but is not limited to gallstone pancreatitis, alcoholic pancreatitis, triglyceride pancreatitis, gastritis, perforated viscus.    Pulse ox:  98%:Normal on RA, as independently interpreted by myself    Medical Decision Making  Evaluation for upper abdominal pain associated nausea/vomiting started prior parotitis without clear inciting factor, labs with lipase as noted, CT with uncomplicated pancreatitis.  Pain initially resolved though with recurrence despite parenteral analgesia for which patient to be made for ongoing management.  Triglycerides/LFTs nonacute, case discussed with GI Dr. Tesfaye in consultation and primary care coverage Dr. Solomon for admission.    Problems Addressed:  Acute pancreatitis, unspecified complication status, unspecified pancreatitis type (HCC): acute illness or injury    Amount and/or Complexity of Data Reviewed  Labs: ordered. Decision-making details documented in ED Course.  Radiology: ordered and independent interpretation performed. Decision-making details documented in ED Course.     Details: CT abd/pelvis without obvious free air as independently interpreted by myself  Discussion of management or test interpretation with external provider(s): Case d/w GI Dr. Tesfaye and primary care coverage Dr. Solomon for admission    Risk  Prescription drug management.  Parenteral controlled substances.  Decision regarding hospitalization.      I was wearing at minimum a facemask and eye protection throughout this encounter with handwashing performed prior and after patient evaluation without personal hand/facial/oropharyngeal contact and gloves worn throughout encounter. See note and/or contact this provider for further PPE details.    Disposition and Plan     Clinical Impression:  1. Acute pancreatitis, unspecified complication status, unspecified pancreatitis type (HCC)        Disposition:  Admit            5/26 GI  Reason for Consultation:   Recurrent acute pancreatitis     History of Present Illness:      Patel Lechuga is a 53 year old man with Body mass index is 29.29 kg/m². with history of diabetes, dyslipidemia, previous  alcohol abuse who is known to our group from previous consultations regarding acute pancreatitis here going back to October 2020 who presents to the ED today     Previous evaluations were significant for previous concern for prolonged alcohol abuse, previous tobacco smoking, acute onset of pancreatitis symptoms; multiple reassuring serum triglyceride levels past 5 years.     Mr. Lechuga presented to the ED 10 AM this morning complaining of several days of abdominal pain suggestive of his previous pancreatitis symptoms.     Evaluation in the ED and since admission has been reassuring with him afebrile; HR 60s-70s; normotensive.     Labs today show WBC 6800, AST 34 ALT 28 alk phosphatase 84 serum lipase 104 [normal 13-75]     Hemoglobin A1c 8.0%     Last CT scan here was February 2023.       IV contrast CT scan today 5/26/2024 shows:  Hepatic steatosis  Gallbladder is present  \"PANCREAS: Mild peripancreatic infiltration is appreciated, particularly adjacent to the uncinate process, which is somewhat edematous in appearance.\"  No evidence for necrosis.  No peripancreatic fluid collection.     Administered 2 doses morphine 4 mg first 4 hours here in the ED; last dose over 4 hours ago.     On assessment this evening, Mr. Lechuga looks well.  Though this episode was a bit different from previous pancreatitis episodes merrily and its location , overall reminiscent of previous episodes and tolerable so far.  He is tolerating clear liquids and is optimistic that he will improve tonight.     Continues a mild tobacco habit.  Remains sober and an active participant in AA.      Impression:      53-year-old gentleman with BMI 29.3, previous history alcohol abuse, tobacco smoking who presents with mild acute recurrent interstitial pancreatitis on imaging.  Symptoms began several days prior to admission.  No peripancreatic fluid or suggestion of pancreatic necrosis on today's IV contrast CT scan.     Reassuring interview exam and  vitals today.     Recommendations:     Lactated Ringer's IV fluid support  Bowel rest, ice chips/clear liquids as tolerated  No antibiotics  Lovenox prophylaxis has been ordered  Tobacco cessation discussed.  Unclear if vaping would be a better idea.       MEDICATIONS ADMINISTERED IN LAST 1 DAY:  clobetasol (Temovate) 0.05 % ointment       Date Action Dose Route User    Discharged on 5/27/2024 5/27/2024 0856 Given (none) Topical Steph Nazario RN          dextrose 5%-sodium chloride 0.45% infusion       Date Action Dose Route User    Discharged on 5/27/2024 5/27/2024 0855 New Bag (none) Intravenous Steph Nazario RN          enoxaparin (Lovenox) 40 MG/0.4ML SUBQ injection 40 mg       Date Action Dose Route User    Discharged on 5/27/2024 5/27/2024 0852 Given 40 mg Subcutaneous (Right Lower Abdomen) Steph Nazario RN          folic acid (Folvite) tab 1 mg       Date Action Dose Route User    Discharged on 5/27/2024 5/27/2024 0852 Given 1 mg Oral Steph Nazario RN          insulin aspart (NovoLOG) 100 Units/mL FlexPen 1-11 Units       Date Action Dose Route User    Discharged on 5/27/2024 5/27/2024 0856 Given 2 Units Subcutaneous (Right Lower Abdomen) Steph Nazario RN          lamoTRIgine (LaMICtal) tab 50 mg       Date Action Dose Route User    Discharged on 5/27/2024 5/27/2024 0852 Given 50 mg Oral Steph Nazario RN          magnesium oxide (Mag-Ox) tab 400 mg       Date Action Dose Route User    Discharged on 5/27/2024 5/27/2024 0852 Given 400 mg Oral Steph Nazario RN          morphINE PF 4 MG/ML injection 4 mg       Date Action Dose Route User    Discharged on 5/27/2024 5/27/2024 0852 Given 4 mg Intravenous Steph Nazario RN            Vitals (last day) before discharge       Date/Time Temp Pulse Resp BP SpO2 Weight O2 Device O2 Flow Rate (L/min) Who    05/27/24 0851 98.5 °F (36.9 °C) 76 16 125/80 95 % -- None (Room air) -- AL    05/27/24 0539 98.4 °F (36.9  °C) 67 18 130/76 92 % -- None (Room air) -- IB    24 2039 98 °F (36.7 °C) 64 18 124/78 96 % -- None (Room air) -- IB    24 1529 -- -- -- -- -- 210 lb -- -- CE    24 1521 97.4 °F (36.3 °C) 64 18 127/82 96 % -- None (Room air) -- CE    24 1415 -- 66 20 112/66 96 % -- -- -- JN    24 1400 -- 66 20 113/68 97 % -- -- -- JN    24 1345 -- 77 20 126/82 97 % -- -- -- JN    24 1330 -- 67 20 106/66 97 % -- -- -- JN    24 1315 -- 68 20 104/67 96 % -- -- -- JN    24 1230 -- 73 20 120/81 97 % -- -- -- JN    24 1200 -- 73 20 127/86 95 % -- -- -- JN    24 1145 -- 86 20 129/85 94 % -- -- -- JN    24 1008 98 °F (36.7 °C) 84 18 137/87 98 % 210 lb -- -- JS           --------------  DISCHARGE REVIEW    Payor: [employer] CHO[employer]/HMO/POS/EPO  Subscriber #:  [id number]  Authorization Number: [id number]    Admit date: 24  Admit time:   3:13 PM  Discharge Date: 2024 12:51 PM     Admitting Physician: Kingsley Solomon MD  Attending Physician:  No att. providers found  Primary Care Physician: Aram Ann MD          Discharge Summary Notes        Discharge Summary signed by Kingsley Solomon MD at 2024 11:58 AM       Author: Kingsley Solomon MD Specialty: Internal Medicine Author Type: Physician    Filed: 2024 11:58 AM Date of Service: 2024 11:56 AM Status: Signed    : Kingsley Solomon MD (Physician)         [facility]  Hospitalist Discharge Summary    Patel Lechuga Patient Status:  Inpatient    9/15/1970 MRN [id number]   Location [facility] 5SW/SE Attending Kingsley Solomon MD   Hosp Day # 1 PCP Aram Ann MD     Date of Admission: 2024  Date of Discharge: 24   Discharge Disposition: Home or Self Care    Admitting Diagnosis:   Acute pancreatitis, unspecified complication status, unspecified pancreatitis type (HCC) [K85.90]    Hospital Discharge Diagnoses:  Acute pancreatitis, type II DM    Lace+  Score: 41  59-90 High Risk  29-58 Medium Risk  0-28   Low Risk.    TCM Follow-Up Recommendation:  LACE 29-58: Moderate Risk of readmission after discharge from the hospital.    Risk of readmission: Patel Lechuga has Moderate Risk of readmission after discharge from the hospital.    Discharge Diagnosis:   Acute pancreatitis, type II DM    History of Present Illness:   Patel Lechuga is a(n) 53 year old pleasant gentleman with past medical history of remote alcohol use, episodes of acute pancreatitis, type II DM, dyslipidemia who presented to the emergency room with abdominal pain     Patient presented to the emergency room 5/26/2024 with epigastric abdominal pain of several day duration.  Patient reported that pain resembled his pancreatitis attack symptoms.  Reported nausea but no vomiting.  Denies alcohol use.\     In the emergency room vital signs were unremarkable.  Triglycerides 251, lipase 104, CT scan suggestive of pancreatitis.  GI service who knows the patient was consulted and the patient was admitted for management with IV fluids and pain medications.    Brief Synopsis:   Patient has been tolerating clear liquid diet without issues.  Reports that his symptoms are much better today.  Reports that pain is well-controlled with IV morphine.  Patient wishes to be discharged today with oral pain medications.     Acute pancreatitis, unspecified complication status, unspecified pancreatitis type (HCC)  -Symptoms improved with IV fluids and IV pain medications  -Patient known to GI service.  They cleared him for discharge given that his symptoms have improved on clear liquid diet.  Patient will advance his diet as tolerated at home       Type 2 diabetes mellitus without complication, without long-term current use of insulin (HCC)  -Was treated with sliding scale insulin in the hospital.  Resume home medications on discharge          Major depression  -Continue home meds    Procedures during hospitalization:    N/A    Incidental or significant findings and recommendations (brief descriptions):  N/A    Lab/Test results pending at Discharge:   N/A    Consultants:  Gerry Tesfaye MD-gastroenterology    Discharge Medication List:     Discharge Medications        START taking these medications        Instructions Prescription details   oxyCODONE-acetaminophen 5-325 MG Tabs  Commonly known as: Percocet      Take 1 tablet by mouth every 6 (six) hours as needed for Pain.   Quantity: 30 tablet  Refills: 0            CONTINUE taking these medications        Instructions Prescription details   clobetasol 0.05 % Oint  Commonly known as: Temovate      Apply twice daily  Monday-Friday. Take weekends off. Use on hands and elbows.   Quantity: 45 g  Refills: 2     folic acid 1 MG Tabs  Commonly known as: Folvite      Take 1 tablet (1 mg total) by mouth daily.   Quantity: 90 tablet  Refills: 3     FreeStyle Keshav 2 [place] Candace      1 Device daily.   Quantity: 1 each  Refills: 0     FreeStyle Keshav 2 Sensor Misc      1 each every 14 (fourteen) days.   Quantity: 2 each  Refills: 1     glipiZIDE ER 2.5 MG Tb24  Commonly known as: Glucotrol XL      Take 1 tablet (2.5 mg total) by mouth daily with breakfast.   Quantity: 90 tablet  Refills: 0     lamoTRIgine 25 MG Tabs  Commonly known as: LaMICtal      Take 2 tablets (50 mg total) by mouth 2 (two) times daily.   Refills: 0     metFORMIN HCl 1000 MG Tabs  Commonly known as: GLUCOPHAGE      TAKE 1 TABLET BY MOUTH TWICE  DAILY WITH MEALS   Quantity: 180 tablet  Refills: 3     Omeprazole 40 MG Cpdr      TAKE 1 CAPSULE BY MOUTH DAILY   Quantity: 90 capsule  Refills: 3     PARoxetine 20 MG Tabs  Commonly known as: Paxil      Take 1 tablet (20 mg total) by mouth at bedtime.   Refills: 0     POGO Automatic Blood Glucose Candace      1 Device daily. Pt checks BS 1 daily E11.9   Quantity: 1 each  Refills: 0     Sildenafil Citrate 100 MG Tabs  Commonly known as: VIAGRA      Take 1 tablet (100 mg  total) by mouth daily as needed for Erectile Dysfunction.   Quantity: 18 tablet  Refills: 1     Skyrizi Pen 150 MG/ML Soaj  Generic drug: Risankizumab-rzaa      Inject 150 mg into the skin every 3 (three) months.   Quantity: 1 mL  Refills: 1            STOP taking these medications      Cosentyx Sensoready (300 MG) 150 MG/ML Soaj  Generic drug: Secukinumab (300 MG Dose)        gabapentin 100 MG Caps  Commonly known as: Neurontin        Glucose Blood Automatic Test                  Where to Get Your Medications        These medications were sent to [facility]/pharmacy #[id number] - [place], [place] - [address] AT [facility], [phone], [phone] [address], [place] [address]      Phone: [phone]   oxyCODONE-acetaminophen 5-325 MG Tabs         Follow-up appointment:   No follow-up provider specified.    Vital signs:  Temp:  [97.4 °F (36.3 °C)-98.5 °F (36.9 °C)] 98.5 °F (36.9 °C)  Pulse:  [64-77] 76  Resp:  [16-20] 16  BP: (104-130)/(66-86) 125/80  SpO2:  [92 %-97 %] 95 %    Physical Exam:    General: No acute distress.   Respiratory: Clear to auscultation bilaterally. No wheezes. No rhonchi.  Cardiovascular: S1, S2. Regular rate and rhythm. No murmurs, rubs or gallops.   Abdomen: Soft, nontender, nondistended.  Positive bowel sounds. No rebound or guarding.  Neurologic: No focal neurological deficits.   Musculoskeletal: Moves all extremities.  Extremities: No edema.  -----------------------------------------------------------------------------------------------  PATIENT DISCHARGE INSTRUCTIONS: See electronic chart    Kingsley Solomon MD 5/27/2024    Time spent:  > 30 minutes    Electronically signed by Kingsley Solomon MD on 5/27/2024 11:58 AM         REVIEWER COMMENTS

## 2024-05-29 ENCOUNTER — PATIENT OUTREACH (OUTPATIENT)
Dept: CASE MANAGEMENT | Age: 54
End: 2024-05-29

## 2024-05-31 ENCOUNTER — PATIENT OUTREACH (OUTPATIENT)
Dept: CASE MANAGEMENT | Age: 54
End: 2024-05-31

## 2024-05-31 NOTE — PROGRESS NOTES
DM apt request (discharged 05/27)    Dr Aram Ann  Internal Medicine  [address] [place] [address] [id number] [phone]  Unable to contact pt; someone answered the phone, tried to introduce myself and they hung up

## 2024-06-05 NOTE — PROGRESS NOTES
DM apt request (discharged 05/27)     Dr Aram Ann  Internal Medicine  [address] [place] [id number]  [phone]  M to call [phone] to assist w/scheduling apt  Multiple attempts; no apt made  Closing encounter

## 2024-06-18 RX ORDER — OMEPRAZOLE 40 MG/1
CAPSULE, DELAYED RELEASE ORAL
Qty: 90 CAPSULE | Refills: 3 | Status: SHIPPED | OUTPATIENT
Start: 2024-06-18

## 2024-06-18 RX ORDER — GLIPIZIDE 2.5 MG/1
2.5 TABLET, EXTENDED RELEASE ORAL
Qty: 90 TABLET | Refills: 3 | Status: SHIPPED | OUTPATIENT
Start: 2024-06-18

## 2024-06-19 NOTE — TELEPHONE ENCOUNTER
Refill passed per [facility] protocol.    Requested Prescriptions   Pending Prescriptions Disp Refills    OMEPRAZOLE 40 MG Oral Capsule Delayed Release [Pharmacy Med Name: Omeprazole 40 MG Oral Capsule Delayed Release] 90 capsule 3     Sig: TAKE 1 CAPSULE BY MOUTH DAILY       Gastrointestional Medication Protocol Passed - 6/16/2024  3:22 PM        Passed - In person appointment or virtual visit in the past 12 mos or appointment in next 3 mos     Recent Outpatient Visits              4 months ago Elevated glucose level    [facility], Aram Jennings MD    Telemedicine    5 months ago Psoriasis    [facility], [address], [place] Maximino Scott MD    Office Visit    9 months ago Acute viral syndrome    [facility] [place], Virtual Visit Sheela Patel APRN    E-Visit    10 months ago Annual physical exam    [facility] Aram Herrera MD    Office Visit    11 months ago Psoriasis    [facility], Maximino Rose MD    Office Visit                    Please review; protocol failed.      METFORMIN HCL 1000 MG Oral Tab [Pharmacy Med Name: metFORMIN HCl 1000 MG Oral Tablet] 180 tablet 3     Sig: TAKE 1 TABLET BY MOUTH TWICE  DAILY WITH MEALS       Diabetes Medication Protocol Failed - 6/16/2024  3:22 PM        Failed - Last A1C < 7.5 and within past 6 months     Lab Results   Component Value Date    A1C 8.0 (H) 05/26/2024             Failed - Microalbumin procedure in past 12 months or taking ACE/ARB        Passed - In person appointment or virtual visit in the past 6 mos or appointment in next 3 mos     Recent Outpatient Visits              4 months ago Elevated glucose level    [facility], Aram Jennings MD    Telemedicine    5 months ago Psoriasis    [facility], [address], [place]  Maximino Scott MD    Office Visit    9 months ago Acute viral syndrome    [facility], Virtual Visit Sheela Patel APRN    E-Visit    10 months ago Annual physical exam    [facility], [place] Eldridge Agron B, MD    Office Visit    11 months ago Psoriasis    [facility], [facility] Maximino Scott MD    Office Visit                      Passed - EGFRCR or GFRNAA > 50     GFR Evaluation  EGFRCR: 83 , resulted on 5/27/2024          Passed - GFR in the past 12 months

## 2024-06-19 NOTE — TELEPHONE ENCOUNTER
Please review; protocol failed.    Requested Prescriptions   Pending Prescriptions Disp Refills    GLIPIZIDE ER 2.5 MG Oral Tablet 24 Hr [Pharmacy Med Name: glipiZIDE ER 2.5 MG Oral Tablet Extended Release 24 Hour] 90 tablet 3     Sig: TAKE 1 TABLET BY MOUTH DAILY  WITH BREAKFAST       Diabetes Medication Protocol Failed - 6/16/2024  3:22 PM        Failed - Last A1C < 7.5 and within past 6 months     Lab Results   Component Value Date    A1C 8.0 (H) 05/26/2024             Failed - Microalbumin procedure in past 12 months or taking ACE/ARB        Passed - In person appointment or virtual visit in the past 6 mos or appointment in next 3 mos     Recent Outpatient Visits              4 months ago Elevated glucose level    [facility], Aram Jennings MD    Telemedicine    5 months ago Psoriasis    [facility], [place] Michalik, Daniel, MD    Office Visit    9 months ago Acute viral syndrome    [facility], Virtual Visit Sheela Patel APRN    E-Visit    10 months ago Annual physical exam    [facility], [place] Eldridge Agron B, MD    Office Visit    11 months ago Psoriasis    [facility], [facility], Maximino Rose MD    Office Visit                      Passed - EGFRCR or GFRNAA > 50     GFR Evaluation  EGFRCR: 83 , resulted on 5/27/2024          Passed - GFR in the past 12 months

## 2024-06-21 RX ORDER — OMEPRAZOLE 40 MG/1
CAPSULE, DELAYED RELEASE ORAL
Qty: 90 CAPSULE | Refills: 3 | OUTPATIENT
Start: 2024-06-21

## 2024-06-21 RX ORDER — GLIPIZIDE 2.5 MG/1
2.5 TABLET, EXTENDED RELEASE ORAL
Qty: 90 TABLET | Refills: 3 | OUTPATIENT
Start: 2024-06-21

## 2024-09-17 ENCOUNTER — OFFICE VISIT (OUTPATIENT)
Dept: INTERNAL MEDICINE CLINIC | Facility: CLINIC | Age: 54
End: 2024-09-17

## 2024-09-17 VITALS
DIASTOLIC BLOOD PRESSURE: 82 MMHG | SYSTOLIC BLOOD PRESSURE: 136 MMHG | HEIGHT: 71 IN | BODY MASS INDEX: 31.78 KG/M2 | RESPIRATION RATE: 17 BRPM | TEMPERATURE: 98 F | WEIGHT: 227 LBS | HEART RATE: 76 BPM | OXYGEN SATURATION: 98 %

## 2024-09-17 DIAGNOSIS — Z00.00 ANNUAL PHYSICAL EXAM: Primary | ICD-10-CM

## 2024-09-17 DIAGNOSIS — Z71.6 ENCOUNTER FOR SMOKING CESSATION COUNSELING: ICD-10-CM

## 2024-09-17 DIAGNOSIS — R10.13 DYSPEPSIA: ICD-10-CM

## 2024-09-17 DIAGNOSIS — F33.2 SEVERE RECURRENT MAJOR DEPRESSION WITHOUT PSYCHOTIC FEATURES (HCC): ICD-10-CM

## 2024-09-17 DIAGNOSIS — F19.11 HISTORY OF DRUG ABUSE IN REMISSION (HCC): ICD-10-CM

## 2024-09-17 DIAGNOSIS — L40.9 PSORIASIS: ICD-10-CM

## 2024-09-17 DIAGNOSIS — E11.00 TYPE 2 DIABETES MELLITUS WITH HYPEROSMOLARITY WITHOUT COMA, WITHOUT LONG-TERM CURRENT USE OF INSULIN (HCC): ICD-10-CM

## 2024-09-17 DIAGNOSIS — I10 PRIMARY HYPERTENSION: ICD-10-CM

## 2024-09-17 DIAGNOSIS — F10.11 ALCOHOL ABUSE, IN REMISSION: ICD-10-CM

## 2024-09-17 PROCEDURE — 99396 PREV VISIT EST AGE 40-64: CPT | Performed by: INTERNAL MEDICINE

## 2024-09-17 RX ORDER — OMEPRAZOLE 40 MG/1
40 CAPSULE, DELAYED RELEASE ORAL DAILY
Qty: 90 CAPSULE | Refills: 3 | Status: SHIPPED | OUTPATIENT
Start: 2024-09-17

## 2024-09-17 RX ORDER — FOLIC ACID 1 MG/1
1 TABLET ORAL DAILY
Qty: 90 TABLET | Refills: 3 | Status: SHIPPED | OUTPATIENT
Start: 2024-09-17

## 2024-09-17 RX ORDER — GLIPIZIDE 2.5 MG/1
2.5 TABLET, EXTENDED RELEASE ORAL
Qty: 90 TABLET | Refills: 3 | Status: SHIPPED | OUTPATIENT
Start: 2024-09-17 | End: 2024-09-22

## 2024-09-17 NOTE — PROGRESS NOTES
Subjective:     Patient ID: Patel Lechuga is a 54 year old male.    HPI  Patient comes in for annual physical in July went to hospital with symptoms of depression anxiety suicidal ideation was discharged home under the care of psychiatry followed with psychiatry outpatient then went inpatient at the rehab facility for drug alcohol abuse he just came out few weeks ago doing okay denies any complaints right now taking medication he is due to go back to work but needs a urine drug screen test done as per patient before he will be allowed to go back  History/Other:   Review of Systems   Constitutional: Negative.  Negative for fatigue and fever.   HENT: Negative.  Negative for congestion.    Eyes: Negative.    Respiratory: Negative.  Negative for cough, shortness of breath and wheezing.    Cardiovascular: Negative.  Negative for chest pain, palpitations and leg swelling.   Gastrointestinal: Negative.    Endocrine: Negative for cold intolerance and heat intolerance.   Genitourinary: Negative.  Negative for dysuria, flank pain and hematuria.   Musculoskeletal: Negative.  Negative for arthralgias, back pain and myalgias.   Skin: Negative.    Neurological: Negative.  Negative for dizziness, tremors, syncope, weakness and headaches.   Psychiatric/Behavioral: Negative.  Negative for agitation, behavioral problems and suicidal ideas. The patient is not nervous/anxious.      Current Outpatient Medications   Medication Sig Dispense Refill    metFORMIN HCl 1000 MG Oral Tab Take 1 tablet (1,000 mg total) by mouth 2 (two) times daily with meals. 180 tablet 3    glipiZIDE ER 2.5 MG Oral Tablet 24 Hr Take 1 tablet (2.5 mg total) by mouth daily with breakfast. 90 tablet 3    folic acid 1 MG Oral Tab Take 1 tablet (1 mg total) by mouth daily. 90 tablet 3    Omeprazole 40 MG Oral Capsule Delayed Release Take 1 capsule (40 mg total) by mouth daily. 90 capsule 3    ARIPiprazole 5 MG Oral Tab Take 1 tablet (5 mg total) by mouth daily.       lamoTRIgine 100 MG Oral Tab Take 1 tablet (100 mg total) by mouth 2 (two) times daily.      Sildenafil Citrate 100 MG Oral Tab Take 1 tablet (100 mg total) by mouth daily as needed for Erectile Dysfunction. 18 tablet 1    Risankizumab-rzaa (SKYRIZI PEN) 150 MG/ML Subcutaneous Solution Auto-injector Inject 150 mg into the skin every 3 (three) months. 1 mL 1    clobetasol 0.05 % External Ointment Apply twice daily  Monday-Friday. Take weekends off. Use on hands and elbows. 45 g 2    PARoxetine 20 MG Oral Tab Take 1 tablet (20 mg total) by mouth at bedtime.       Allergies:  Allergies   Allergen Reactions    Cat Hair Extract ITCHING    Dander ITCHING      Cat dander: Watery eyes, sneezing       Past Medical History:    Depression    Diabetes (HCC)    Eczema    Hyperlipidemia    Pancreatitis (HCC)    Psoriasis      Past Surgical History:   Procedure Laterality Date    Colonoscopy screening - referral N/A 07/25/2022    Procedure: COLONOSCOPY-SCREENING/ ESOPHAGOGASTRODUODENOSCOPY (EGD)/ENDOSCOPIC ULTRASOUND with popssible Fine needle aspiration;  Surgeon: Eliezer Roth MD;  Location: [facility] ENDOSCOPY    Hernia surgery        Family History   Problem Relation Age of Onset    Diabetes Mother     Cancer Sister         breast cancer    No Known Problems Brother     No Known Problems Brother     No Known Problems Brother     Cancer Sister       Social History:   Social History     Socioeconomic History    Marital status: Single   Tobacco Use    Smoking status: Former     Current packs/day: 0.00     Types: Cigarettes     Passive exposure: Current    Smokeless tobacco: Current    Tobacco comments:     3-4 a day    Vaping Use    Vaping status: Every Day    Substances: Nicotine    Devices: Disposable   Substance and Sexual Activity    Alcohol use: Not Currently     Comment: drank 12 beers Sunday night (7-). He also reported having 5 beers last night and also used cocaine. He was previously sober for 22 months.    Drug  use: Not Currently     Types: \"Crack\" cocaine     Comment: sober from substance for 22 months. relapsed sunday used crack cocaine on 7-18-24  ( $50.00 of cocaine)   Other Topics Concern    Grew up on a farm No    History of tanning No    Outdoor occupation No    Reaction to local anesthetic No    Caffeine Concern Yes    Exercise No    Pt has a pacemaker No    Pt has a defibrillator No   Social History Narrative    The patient does not use an assistive device..      The patient does live in a home with stairs.     Social Determinants of Health     Financial Resource Strain: Low Risk  (2/17/2023)    Financial Resource Strain     Difficulty of Paying Living Expenses: Not hard at all     Med Affordability: No   Food Insecurity: No Food Insecurity (5/26/2024)    Food Insecurity     Food Insecurity: Never true   Transportation Needs: No Transportation Needs (5/26/2024)    Transportation Needs     Lack of Transportation: No   Housing Stability: Low Risk  (5/26/2024)    Housing Stability     Housing Instability: No        Objective:   Physical Exam  Vitals and nursing note reviewed.   Constitutional:       Appearance: He is well-developed.   HENT:      Head: Normocephalic and atraumatic.      Right Ear: External ear normal.      Left Ear: External ear normal.      Nose: Nose normal.   Eyes:      Conjunctiva/sclera: Conjunctivae normal.      Pupils: Pupils are equal, round, and reactive to light.   Cardiovascular:      Rate and Rhythm: Normal rate and regular rhythm.      Heart sounds: Normal heart sounds.   Pulmonary:      Effort: Pulmonary effort is normal.      Breath sounds: Normal breath sounds.   Abdominal:      General: Bowel sounds are normal.      Palpations: Abdomen is soft.   Genitourinary:     Penis: Normal.       Prostate: Normal.      Rectum: Normal.   Musculoskeletal:         General: Normal range of motion.      Cervical back: Normal range of motion and neck supple.   Skin:     General: Skin is warm and dry.    Neurological:      Mental Status: He is alert and oriented to person, place, and time.      Deep Tendon Reflexes: Reflexes are normal and symmetric.         Assessment & Plan:   1. Annual physical exam -exam is okay will order labs   2. Severe recurrent major depression without psychotic features (HCC) doing better taking medication follows with psych   3. History of drug abuse in remission (HCC) doing okay will order drug test   4. Psoriasis stable as needed cream   5. Type 2 diabetes mellitus with hyperosmolarity without coma, without long-term current use of insulin (HCC) we will retest will refer to eye and foot doctor   6. Dyspepsia continue current treatment   7. Encounter for smoking cessation counseling patient will let us know if when he is ready   8. Alcohol abuse, in remission doing well continue to refrain   9. Primary hypertension continue current treatment       Orders Placed This Encounter   Procedures    DRUG SCREEN URINE [6635] [Q]    DRUG SCREEN URINE [6635] [Q]    CBC With Differential With Platelet    Comp Metabolic Panel (14)    Lipid Panel    TSH W Reflex To Free T4    Urinalysis, Routine    Hemoglobin A1C    Microalb/Creat Ratio, Random Urine    PSA, TOTAL W REFLEX TO PSA, FREE [43050][Q]       Meds This Visit:  Requested Prescriptions     Signed Prescriptions Disp Refills    metFORMIN HCl 1000 MG Oral Tab 180 tablet 3     Sig: Take 1 tablet (1,000 mg total) by mouth 2 (two) times daily with meals.    glipiZIDE ER 2.5 MG Oral Tablet 24 Hr 90 tablet 3     Sig: Take 1 tablet (2.5 mg total) by mouth daily with breakfast.    folic acid 1 MG Oral Tab 90 tablet 3     Sig: Take 1 tablet (1 mg total) by mouth daily.    Omeprazole 40 MG Oral Capsule Delayed Release 90 capsule 3     Sig: Take 1 capsule (40 mg total) by mouth daily.       Imaging & Referrals:  OPHTHALMOLOGY - INTERNAL  PODIATRY - INTERNAL

## 2024-09-22 LAB
ABSOLUTE BASOPHILS: 59 CELLS/UL (ref 0–200)
ABSOLUTE EOSINOPHILS: 119 CELLS/UL (ref 15–500)
ABSOLUTE LYMPHOCYTES: 1610 CELLS/UL (ref 850–3900)
ABSOLUTE MONOCYTES: 422 CELLS/UL (ref 200–950)
ABSOLUTE NEUTROPHILS: 4389 CELLS/UL (ref 1500–7800)
ALBUMIN/GLOBULIN RATIO: 1.2 (CALC) (ref 1–2.5)
ALBUMIN: 4.2 G/DL (ref 3.6–5.1)
ALKALINE PHOSPHATASE: 102 U/L (ref 35–144)
ALT: 47 U/L (ref 9–46)
APPEARANCE: CLEAR
AST: 53 U/L (ref 10–35)
BASOPHILS: 0.9 %
BILIRUBIN, TOTAL: 0.8 MG/DL (ref 0.2–1.2)
BILIRUBIN: NEGATIVE
BUN: 8 MG/DL (ref 7–25)
CALCIUM: 9.7 MG/DL (ref 8.6–10.3)
CARBON DIOXIDE: 25 MMOL/L (ref 20–32)
CHLORIDE: 98 MMOL/L (ref 98–110)
CHOL/HDLC RATIO: 4.3 (CALC)
CHOLESTEROL, TOTAL: 152 MG/DL
CREATININE, RANDOM URINE: 150 MG/DL (ref 20–320)
CREATININE: 0.98 MG/DL (ref 0.7–1.3)
EGFR: 93 ML/MIN/1.73M2
EOSINOPHILS: 1.8 %
GLOBULIN: 3.4 G/DL (CALC) (ref 1.9–3.7)
GLUCOSE: 228 MG/DL (ref 65–99)
GLUCOSE: NEGATIVE
HDL CHOLESTEROL: 35 MG/DL
HEMATOCRIT: 48.4 % (ref 38.5–50)
HEMOGLOBIN A1C: 9.5 % OF TOTAL HGB
HEMOGLOBIN: 15.9 G/DL (ref 13.2–17.1)
LDL-CHOLESTEROL: 74 MG/DL (CALC)
LEUKOCYTE ESTERASE: NEGATIVE
LYMPHOCYTES: 24.4 %
MCH: 31.1 PG (ref 27–33)
MCHC: 32.9 G/DL (ref 32–36)
MCV: 94.7 FL (ref 80–100)
MICROALBUMIN/CREATININE RATIO, RANDOM URINE: 6 MG/G CREAT
MICROALBUMIN: 0.9 MG/DL
MONOCYTES: 6.4 %
MPV: 9.7 FL (ref 7.5–12.5)
NEUTROPHILS: 66.5 %
NITRITE: NEGATIVE
NON-HDL CHOLESTEROL: 117 MG/DL (CALC)
OCCULT BLOOD: NEGATIVE
PH: 5.5 (ref 5–8)
PLATELET COUNT: 270 THOUSAND/UL (ref 140–400)
POTASSIUM: 4.6 MMOL/L (ref 3.5–5.3)
PROTEIN, TOTAL: 7.6 G/DL (ref 6.1–8.1)
PROTEIN: NEGATIVE
RDW: 12.5 % (ref 11–15)
RED BLOOD CELL COUNT: 5.11 MILLION/UL (ref 4.2–5.8)
SODIUM: 134 MMOL/L (ref 135–146)
SPECIFIC GRAVITY: 1.02 (ref 1–1.03)
TRIGLYCERIDES: 354 MG/DL
TSH W/REFLEX TO FT4: 3.15 MIU/L (ref 0.4–4.5)
WHITE BLOOD CELL COUNT: 6.6 THOUSAND/UL (ref 3.8–10.8)

## 2024-09-22 RX ORDER — GLIPIZIDE 5 MG/1
5 TABLET, FILM COATED, EXTENDED RELEASE ORAL
Qty: 90 TABLET | Refills: 1 | Status: SHIPPED | OUTPATIENT
Start: 2024-09-22

## 2024-09-23 ENCOUNTER — LAB ENCOUNTER (OUTPATIENT)
Dept: LAB | Facility: HOSPITAL | Age: 54
End: 2024-09-23
Attending: INTERNAL MEDICINE
Payer: COMMERCIAL

## 2024-09-23 DIAGNOSIS — F19.11 HISTORY OF DRUG ABUSE IN REMISSION (HCC): ICD-10-CM

## 2024-09-23 LAB
AMPHET UR QL SCN: NEGATIVE
BARBITURATES UR QL SCN: NEGATIVE
BENZODIAZ UR QL SCN: NEGATIVE
CANNABINOIDS UR QL SCN: NEGATIVE
COCAINE UR QL: NEGATIVE
CREAT UR-SCNC: 85.6 MG/DL
FENTANYL UR QL SCN: NEGATIVE
MDMA UR QL SCN: NEGATIVE
METHADONE UR QL SCN: NEGATIVE
OPIATES UR QL SCN: NEGATIVE
OXYCODONE UR QL SCN: NEGATIVE
PCP UR QL SCN: NEGATIVE

## 2024-09-23 PROCEDURE — 80307 DRUG TEST PRSMV CHEM ANLYZR: CPT

## 2024-09-24 LAB — TOTAL PSA: 0.3 NG/ML

## 2024-09-25 ENCOUNTER — LAB ENCOUNTER (OUTPATIENT)
Dept: LAB | Facility: HOSPITAL | Age: 54
End: 2024-09-25
Attending: INTERNAL MEDICINE
Payer: COMMERCIAL

## 2024-09-25 DIAGNOSIS — F19.11 HISTORY OF DRUG ABUSE IN REMISSION (HCC): ICD-10-CM

## 2024-09-25 LAB
AMPHET UR QL SCN: NEGATIVE
BARBITURATES UR QL SCN: NEGATIVE
BENZODIAZ UR QL SCN: NEGATIVE
CANNABINOIDS UR QL SCN: NEGATIVE
COCAINE UR QL: NEGATIVE
CREAT UR-SCNC: 104.1 MG/DL
FENTANYL UR QL SCN: NEGATIVE
MDMA UR QL SCN: NEGATIVE
METHADONE UR QL SCN: NEGATIVE
OPIATES UR QL SCN: NEGATIVE
OXYCODONE UR QL SCN: NEGATIVE
PCP UR QL SCN: NEGATIVE

## 2024-09-25 PROCEDURE — 80307 DRUG TEST PRSMV CHEM ANLYZR: CPT

## 2024-09-27 ENCOUNTER — TELEPHONE (OUTPATIENT)
Dept: INTERNAL MEDICINE CLINIC | Facility: CLINIC | Age: 54
End: 2024-09-27

## 2024-09-27 NOTE — TELEPHONE ENCOUNTER
Dr [facility]  [phone]    Patient missed their DM eye exam; no response from texting and calling patient.

## 2024-09-29 DIAGNOSIS — Z51.81 MEDICATION MONITORING ENCOUNTER: ICD-10-CM

## 2024-09-29 DIAGNOSIS — L40.8 OTHER PSORIASIS: Primary | ICD-10-CM

## 2024-10-01 ENCOUNTER — LAB ENCOUNTER (OUTPATIENT)
Dept: LAB | Age: 54
End: 2024-10-01
Attending: STUDENT IN AN ORGANIZED HEALTH CARE EDUCATION/TRAINING PROGRAM
Payer: COMMERCIAL

## 2024-10-01 ENCOUNTER — OFFICE VISIT (OUTPATIENT)
Dept: DERMATOLOGY CLINIC | Facility: CLINIC | Age: 54
End: 2024-10-01
Payer: COMMERCIAL

## 2024-10-01 DIAGNOSIS — Z51.81 MEDICATION MONITORING ENCOUNTER: ICD-10-CM

## 2024-10-01 DIAGNOSIS — L40.9 PSORIASIS: Primary | ICD-10-CM

## 2024-10-01 PROCEDURE — 86480 TB TEST CELL IMMUN MEASURE: CPT | Performed by: STUDENT IN AN ORGANIZED HEALTH CARE EDUCATION/TRAINING PROGRAM

## 2024-10-01 PROCEDURE — 36415 COLL VENOUS BLD VENIPUNCTURE: CPT | Performed by: STUDENT IN AN ORGANIZED HEALTH CARE EDUCATION/TRAINING PROGRAM

## 2024-10-01 PROCEDURE — 99214 OFFICE O/P EST MOD 30 MIN: CPT | Performed by: STUDENT IN AN ORGANIZED HEALTH CARE EDUCATION/TRAINING PROGRAM

## 2024-10-01 RX ORDER — RISANKIZUMAB-RZAA 150 MG/ML
150 INJECTION SUBCUTANEOUS
Qty: 1 ML | Refills: 1 | OUTPATIENT
Start: 2024-10-01

## 2024-10-01 NOTE — TELEPHONE ENCOUNTER
Refill request has failed the Ambulatory Medication Refill Standing Order and is routed to the primary physician to review the following:    Requested Prescriptions     Refused Prescriptions Disp Refills    SKYRIZI  MG/ML Subcutaneous Solution Auto-injector [Pharmacy Med Name: SKYRIZI PEN 150MG/ML] 1 mL 1     Sig: INJECT 150MG SUBCUTANEOUSLY  EVERY 3 MONTHS     Refused By: TYLER ALEXANDER     Reason for Refusal: Appt required, please call patient       He needs quant gold too - order pended

## 2024-10-01 NOTE — PROGRESS NOTES
October 1, 2024    Established patient        CHIEF COMPLAINT: Psoriasis F/U    HISTORY OF PRESENT ILLNESS: Patel Lechuga is a 54 year old male here for evaluation of rash.    Location: Elbows, Hands, Knees, Scalp  Duration: Months   Signs and symptoms: Notes Improvement   Current treatment: Skyrizi  Past treatments: Consentyx    Needs Quant Gold for Skyrizi refill     Personal Dermatologic History  History of chronic skin disease: Yes    Family History  History of chronic skin disease: No  History autoimmune diseases:  No    Past Medical History  Past Medical History:    Depression    Diabetes (HCC)    Eczema    Hyperlipidemia    Pancreatitis (HCC)    Psoriasis       REVIEW OF SYSTEMS:  Constitutional: Denies fever, chills, unintentional weight loss.   Skin as per HPI    Medications  Current Outpatient Medications   Medication Sig Dispense Refill    glipiZIDE ER 5 MG Oral Tablet 24 Hr Take 1 tablet (5 mg total) by mouth daily with breakfast. 90 tablet 1    metFORMIN HCl 1000 MG Oral Tab Take 1 tablet (1,000 mg total) by mouth 2 (two) times daily with meals. 180 tablet 3    folic acid 1 MG Oral Tab Take 1 tablet (1 mg total) by mouth daily. 90 tablet 3    Omeprazole 40 MG Oral Capsule Delayed Release Take 1 capsule (40 mg total) by mouth daily. 90 capsule 3    ARIPiprazole 5 MG Oral Tab Take 1 tablet (5 mg total) by mouth daily.      lamoTRIgine 100 MG Oral Tab Take 1 tablet (100 mg total) by mouth 2 (two) times daily.      Sildenafil Citrate 100 MG Oral Tab Take 1 tablet (100 mg total) by mouth daily as needed for Erectile Dysfunction. 18 tablet 1    Risankizumab-rzaa (SKYRIZI PEN) 150 MG/ML Subcutaneous Solution Auto-injector Inject 150 mg into the skin every 3 (three) months. 1 mL 1    clobetasol 0.05 % External Ointment Apply twice daily  Monday-Friday. Take weekends off. Use on hands and elbows. 45 g 2    PARoxetine 20 MG Oral Tab Take 1 tablet (20 mg total) by mouth at bedtime.         PHYSICAL  EXAM:  General: awake, alert, no acute distress  Skin: Skin exam was performed today including the following: trunk and extremities. Pertinent findings include:   - with small scaly plaques    ASSESSMENT & PLAN:  Pathophysiology of diagnoses discussed with patient.  Therapeutic options reviewed. Risks, benefits, and alternatives discussed with patient. Instructions reviewed at length.    #Psoriasis - improved on Skyrizi  #Psoriatic arthritis  - Continue Skyrizi every 3 months  - clobetasol 0.05% twice daily to affected areas Monday-Friday. Take weekends off. Avoid use on face, breasts, groin, or axillae.    High risk medication monitoring   -TB screening - repeat annually  -No live vaccines  -Flu shot yearly      Return to clinic: 1 year or sooner if something concerning arises    Maximino Scott MD

## 2024-10-03 LAB
M TB IFN-G CD4+ T-CELLS BLD-ACNC: 0.03 IU/ML
M TB TUBERC IFN-G BLD QL: NEGATIVE
M TB TUBERC IGNF/MITOGEN IGNF CONTROL: >10 IU/ML
QFT TB1 AG MINUS NIL: 0.01 IU/ML
QFT TB2 AG MINUS NIL: -0.01 IU/ML

## 2024-10-03 RX ORDER — RISANKIZUMAB-RZAA 150 MG/ML
150 INJECTION SUBCUTANEOUS
Qty: 1 ML | Refills: 1 | Status: SHIPPED | OUTPATIENT
Start: 2024-10-03

## 2024-12-23 NOTE — LETTER
6/21/2022      To Whom It May Concern:    Vamsi Eason is currently under my medical care and may not return to work at this time. He may return to work on 6/24/22. Activity is restricted as follows: none. If you require additional information please contact our office. Sincerely,    Nhan Orozco MD  [address] [address] [phone]
Spontaneous, unlabored and symmetrical

## 2025-01-03 ENCOUNTER — OFFICE VISIT (OUTPATIENT)
Dept: INTERNAL MEDICINE CLINIC | Facility: CLINIC | Age: 55
End: 2025-01-03

## 2025-01-03 ENCOUNTER — TELEPHONE (OUTPATIENT)
Dept: DERMATOLOGY CLINIC | Facility: CLINIC | Age: 55
End: 2025-01-03

## 2025-01-03 VITALS
SYSTOLIC BLOOD PRESSURE: 126 MMHG | RESPIRATION RATE: 18 BRPM | DIASTOLIC BLOOD PRESSURE: 78 MMHG | OXYGEN SATURATION: 98 % | HEIGHT: 71 IN | HEART RATE: 87 BPM | BODY MASS INDEX: 27.86 KG/M2 | TEMPERATURE: 98 F | WEIGHT: 199 LBS

## 2025-01-03 DIAGNOSIS — F10.10 ALCOHOL ABUSE: ICD-10-CM

## 2025-01-03 DIAGNOSIS — F19.20 DRUG ABUSE AND DEPENDENCE (HCC): ICD-10-CM

## 2025-01-03 DIAGNOSIS — E11.00 TYPE 2 DIABETES MELLITUS WITH HYPEROSMOLARITY WITHOUT COMA, WITHOUT LONG-TERM CURRENT USE OF INSULIN (HCC): Primary | ICD-10-CM

## 2025-01-03 DIAGNOSIS — I10 PRIMARY HYPERTENSION: ICD-10-CM

## 2025-01-03 DIAGNOSIS — L40.8 OTHER PSORIASIS: ICD-10-CM

## 2025-01-03 DIAGNOSIS — R79.89 ELEVATED LFTS: ICD-10-CM

## 2025-01-03 DIAGNOSIS — F41.8 DEPRESSION WITH ANXIETY: ICD-10-CM

## 2025-01-03 LAB
CREAT UR-SCNC: 43.5 MG/DL
MICROALBUMIN UR-MCNC: <0.3 MG/DL

## 2025-01-03 PROCEDURE — 36415 COLL VENOUS BLD VENIPUNCTURE: CPT | Performed by: INTERNAL MEDICINE

## 2025-01-03 PROCEDURE — 99214 OFFICE O/P EST MOD 30 MIN: CPT | Performed by: INTERNAL MEDICINE

## 2025-01-03 RX ORDER — GLIPIZIDE 10 MG/1
10 TABLET, FILM COATED, EXTENDED RELEASE ORAL
Qty: 90 TABLET | Refills: 1 | Status: SHIPPED | OUTPATIENT
Start: 2025-01-03

## 2025-01-04 DIAGNOSIS — L40.8 OTHER PSORIASIS: ICD-10-CM

## 2025-01-04 LAB
EST. AVERAGE GLUCOSE BLD GHB EST-MCNC: 306 MG/DL (ref 68–126)
HBA1C MFR BLD: 12.3 % (ref ?–5.7)

## 2025-01-04 RX ORDER — CLOBETASOL PROPIONATE 0.5 MG/G
OINTMENT TOPICAL
Qty: 45 G | Refills: 2 | Status: CANCELLED | OUTPATIENT
Start: 2025-01-04

## 2025-01-04 RX ORDER — RISANKIZUMAB-RZAA 150 MG/ML
150 INJECTION SUBCUTANEOUS
Qty: 1 ML | Refills: 1 | Status: CANCELLED | OUTPATIENT
Start: 2025-01-04

## 2025-01-04 NOTE — PROGRESS NOTES
Subjective:     Patient ID: Patel Lechuga is a 54 year old male.    HPI  Patient comes in today for follow-up with concern about his blood sugars being elevated he says he tries to watch his diet taking medication but patient admits that lately he has been drinking and doing drugs again.  He had stopped taking his medication for his depression and anxiety has not seen psych for some time denies any suicidal homicidal thoughts  History/Other:   Review of Systems   Constitutional: Negative.  Negative for fatigue and fever.   HENT: Negative.  Negative for congestion.    Eyes: Negative.    Respiratory: Negative.  Negative for cough, shortness of breath and wheezing.    Cardiovascular: Negative.  Negative for chest pain, palpitations and leg swelling.   Gastrointestinal: Negative.    Endocrine: Negative for cold intolerance and heat intolerance.   Genitourinary: Negative.  Negative for dysuria, flank pain and hematuria.   Musculoskeletal: Negative.  Negative for arthralgias, back pain and myalgias.   Skin: Negative.    Neurological: Negative.  Negative for dizziness, tremors, syncope, weakness and headaches.   Psychiatric/Behavioral: Negative.  Negative for agitation, behavioral problems and suicidal ideas. The patient is not nervous/anxious.      Current Outpatient Medications   Medication Sig Dispense Refill    glipiZIDE ER 10 MG Oral Tablet 24 Hr Take 1 tablet (10 mg total) by mouth daily with breakfast. 90 tablet 1    Risankizumab-rzaa (SKYRIZI PEN) 150 MG/ML Subcutaneous Solution Auto-injector Inject 150 mg into the skin every 3 (three) months. 1 mL 1    metFORMIN HCl 1000 MG Oral Tab Take 1 tablet (1,000 mg total) by mouth 2 (two) times daily with meals. 180 tablet 3    folic acid 1 MG Oral Tab Take 1 tablet (1 mg total) by mouth daily. 90 tablet 3    Omeprazole 40 MG Oral Capsule Delayed Release Take 1 capsule (40 mg total) by mouth daily. 90 capsule 3    Sildenafil Citrate 100 MG Oral Tab Take 1 tablet (100 mg  total) by mouth daily as needed for Erectile Dysfunction. 18 tablet 1    clobetasol 0.05 % External Ointment Apply twice daily  Monday-Friday. Take weekends off. Use on hands and elbows. 45 g 2    ARIPiprazole 5 MG Oral Tab Take 1 tablet (5 mg total) by mouth daily.      lamoTRIgine 100 MG Oral Tab Take 1 tablet (100 mg total) by mouth 2 (two) times daily.      PARoxetine 20 MG Oral Tab Take 1 tablet (20 mg total) by mouth at bedtime.       Allergies:Allergies[1]    Past Medical History:    Depression    Diabetes (HCC)    Eczema    Hyperlipidemia    Pancreatitis (HCC)    Psoriasis      Past Surgical History:   Procedure Laterality Date    Colonoscopy screening - referral N/A 07/25/2022    Procedure: COLONOSCOPY-SCREENING/ ESOPHAGOGASTRODUODENOSCOPY (EGD)/ENDOSCOPIC ULTRASOUND with popssible Fine needle aspiration;  Surgeon: Eliezer Roth MD;  Location: [facility] ENDOSCOPY    Hernia surgery        Family History   Problem Relation Age of Onset    Diabetes Mother     Cancer Sister         breast cancer    No Known Problems Brother     No Known Problems Brother     No Known Problems Brother     Cancer Sister       Social History:   Social History     Socioeconomic History    Marital status: Single   Tobacco Use    Smoking status: Former     Current packs/day: 0.00     Types: Cigarettes     Passive exposure: Current    Smokeless tobacco: Never    Tobacco comments:     3-4 a day    Vaping Use    Vaping status: Every Day    Substances: Nicotine    Devices: Disposable   Substance and Sexual Activity    Alcohol use: Not Currently     Comment: drank 12 beers Sunday night (7-). He also reported having 5 beers last night and also used cocaine. He was previously sober for 22 months.    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 relapsed sunday used crack cocaine on 7-18-24  ( $50.00 of cocaine)   Other Topics Concern    Grew up on a farm No    History of tanning No     Outdoor occupation No    Reaction to local anesthetic No    Caffeine Concern Yes    Exercise No    Pt has a pacemaker No    Pt has a defibrillator No   Social History Narrative    The patient does not use an assistive device..      The patient does live in a home with stairs.     Social Drivers of Health     Financial Resource Strain: Low Risk  (2/17/2023)    Financial Resource Strain     Difficulty of Paying Living Expenses: Not hard at all     Med Affordability: No   Food Insecurity: No Food Insecurity (5/26/2024)    Food Insecurity     Food Insecurity: Never true   Transportation Needs: No Transportation Needs (5/26/2024)    Transportation Needs     Lack of Transportation: No   Housing Stability: Low Risk  (5/26/2024)    Housing Stability     Housing Instability: No        Objective:   Physical Exam  Vitals and nursing note reviewed.   Constitutional:       Appearance: He is well-developed.   HENT:      Head: Normocephalic and atraumatic.      Right Ear: External ear normal.      Left Ear: External ear normal.      Nose: Nose normal.   Eyes:      Conjunctiva/sclera: Conjunctivae normal.      Pupils: Pupils are equal, round, and reactive to light.   Cardiovascular:      Rate and Rhythm: Normal rate and regular rhythm.      Heart sounds: Normal heart sounds.   Pulmonary:      Effort: Pulmonary effort is normal.      Breath sounds: Normal breath sounds.   Abdominal:      General: Bowel sounds are normal.      Palpations: Abdomen is soft.   Genitourinary:     Penis: Normal.       Prostate: Normal.      Rectum: Normal.   Musculoskeletal:         General: Normal range of motion.      Cervical back: Normal range of motion and neck supple.   Skin:     General: Skin is warm and dry.   Neurological:      Mental Status: He is alert and oriented to person, place, and time.      Deep Tendon Reflexes: Reflexes are normal and symmetric.         Assessment & Plan:   1. Type 2 diabetes mellitus with hyperosmolarity without  coma, without long-term current use of insulin (HCC) will increase the glipizide to 10 mg, watch diet follow with diabetic educator and endocrine, will check A1c      2. Primary hypertension - continue with current     3. Drug abuse and dependence (HCC) - pt recently was using again now obstaining since last week follows with AA    4. Depression with anxiety - will refer to [facility]   5. Elevated LFTs - will order us of liver    6. Alcohol abuse - abstaining since past week follows with aa       Orders Placed This Encounter   Procedures    Hemoglobin A1C    Microalb/Creat Ratio, Random Urine       Meds This Visit:  Requested Prescriptions     Signed Prescriptions Disp Refills    glipiZIDE ER 10 MG Oral Tablet 24 Hr 90 tablet 1     Sig: Take 1 tablet (10 mg total) by mouth daily with breakfast.       Imaging & Referrals:  DIABETIC EDUCATION - INTERNAL   NAVIGATOR  US LIVER WITH ELASTOGRAPHY(CPT=76705,59759)            [1]   Allergies  Allergen Reactions    Cat Hair Extract ITCHING    Dander ITCHING      Cat dander: Watery eyes, sneezing

## 2025-01-05 ENCOUNTER — TELEPHONE (OUTPATIENT)
Age: 55
End: 2025-01-05

## 2025-01-05 NOTE — TELEPHONE ENCOUNTER
[facility]  (Multiple providers)  [address]  [facility] [address]  Phone: [phone]    [facility]  (Multiple providers)  [address] [address]  Phone: [phone]    [facility]  (Multiple providers)  [address] [facility] [address]  [place] [address]  Phone: [phone]    [facility]  (Multiple providers)  [address] [facility] [address]  [place] [address]  Phone: [phone]

## 2025-01-06 RX ORDER — RISANKIZUMAB-RZAA 150 MG/ML
150 INJECTION SUBCUTANEOUS
Qty: 1 ML | Refills: 3 | Status: SHIPPED | OUTPATIENT
Start: 2025-01-06

## 2025-01-06 RX ORDER — CLOBETASOL PROPIONATE 0.5 MG/G
OINTMENT TOPICAL
Qty: 45 G | Refills: 2 | Status: SHIPPED | OUTPATIENT
Start: 2025-01-06

## 2025-01-06 NOTE — TELEPHONE ENCOUNTER
LOV 10/1/24 - Ok to send refills?  Skyrizi to [facility] and Clobetasol to local pharmacy ([facility] in [place]).

## 2025-01-07 ENCOUNTER — TELEPHONE (OUTPATIENT)
Facility: CLINIC | Age: 55
End: 2025-01-07

## 2025-01-07 ENCOUNTER — TELEPHONE (OUTPATIENT)
Dept: ORTHOPEDICS CLINIC | Facility: CLINIC | Age: 55
End: 2025-01-07

## 2025-01-07 NOTE — TELEPHONE ENCOUNTER
New pt appt for Diabetic care no imaging avail   Future Appointments  1/14/2025  12:15 PM   Maximino Scott MD [facility]CSCH[facility]M [facility] [place]  1/30/2025  7:30 AM    [facility] US RM5                 [facility] US              [facility]  1/30/2025  1:00 PM    Knight, Jessica Marie, DPM ECLMBPOD [facility] [place]

## 2025-01-07 NOTE — TELEPHONE ENCOUNTER
No imaging for diabetic care indicated at this time. Patient will be evaluated at time of appointment.

## 2025-01-08 ENCOUNTER — PATIENT MESSAGE (OUTPATIENT)
Dept: INTERNAL MEDICINE CLINIC | Facility: CLINIC | Age: 55
End: 2025-01-08

## 2025-01-08 RX ORDER — FOLIC ACID 1 MG/1
1 TABLET ORAL DAILY
Qty: 90 TABLET | Refills: 1 | Status: SHIPPED | OUTPATIENT
Start: 2025-01-08 | End: 2025-01-09

## 2025-01-08 RX ORDER — OMEPRAZOLE 40 MG/1
40 CAPSULE, DELAYED RELEASE ORAL DAILY
Qty: 90 CAPSULE | Refills: 3 | Status: SHIPPED | OUTPATIENT
Start: 2025-01-08 | End: 2025-01-09

## 2025-01-08 NOTE — TELEPHONE ENCOUNTER
Refill passed per [facility] protocol.  Requested Prescriptions   Pending Prescriptions Disp Refills    metFORMIN HCl 1000 MG Oral Tab 180 tablet 3     Sig: Take 1 tablet (1,000 mg total) by mouth 2 (two) times daily with meals.       Diabetes Medication Protocol Failed - 1/8/2025  4:03 PM        Failed - Last A1C < 7.5 and within past 6 months     Lab Results   Component Value Date    A1C 12.3 (H) 01/03/2025             Passed - In person appointment or virtual visit in the past 6 mos or appointment in next 3 mos     Recent Outpatient Visits              5 days ago Type 2 diabetes mellitus with hyperosmolarity without coma, without long-term current use of insulin (HCC)    [facility]    [facility]    3 months ago Psoriasis    [facility] ([place], [place]) Maximino Scott MD    Office Visit    3 months ago Annual physical exam    [facility] Aram Jennings MD    Office Visit    11 months ago Elevated glucose level    [facility]    [facility]    11 months ago Psoriasis    [facility] [address], [place] Maximino Scott MD    Office Visit          Future Appointments         Provider Department Appt Notes    In 5 days Mimi Clark RN [place] [facility] [place] [facility] Initial    In 6 days Maximino Scott MD [facility] ([place], [place]) follow up    In 3 weeks [address] Ultrasound [facility]     In 3 weeks Belinda Yarbrough DPM [facility], [address], [place] Diabetic care                    Passed - Microalbumin procedure in past 12 months or taking ACE/ARB        Passed - EGFRCR or GFRNAA > 50     GFR Evaluation  EGFRCR: 93 , resulted on 9/21/2024          Passed - GFR in the past 12 months         Passed - Medication is active on med list          folic acid 1 MG Oral Tab 90 tablet 3     Sig: Take 1 tablet (1 mg total) by mouth daily.       There is no refill protocol information for this order       Omeprazole 40 MG Oral Capsule Delayed Release 90 capsule 3     Sig: Take 1 capsule (40 mg total) by mouth daily.       Gastrointestional Medication Protocol Passed - 1/8/2025  4:03 PM        Passed - In person appointment or virtual visit in the past 12 mos or appointment in next 3 mos     Recent Outpatient Visits              5 days ago Type 2 diabetes mellitus with hyperosmolarity without coma, without long-term current use of insulin (HCC)    [facility], Aram Jennings MD    Office Visit    3 months ago Psoriasis    [facility] Maximino Scott MD    Office Visit    3 months ago Annual physical exam    [facility] Aram Jennings MD    Office Visit    11 months ago Elevated glucose level    [facility] Aram Jennings MD    Telemedicine    11 months ago Psoriasis    [facility] [address], [place] Maximino Scott MD    Office Visit          Future Appointments         Provider Department Appt Notes    In 5 days Mimi Clark RN [place] [facility] [place] [facility] Initial    In 6 days Maximino Scott MD [facility] follow up    In 3 weeks [facility]     In 3 weeks Belinda Yarbrough DPM [facility], [address], [place] Diabetic care                    Passed - Medication is active on med list           Future Appointments         Provider Department Appt Notes    In 5 days Mimi Clark RN [place] [facility] [place] [facility] Initial    In 6 days Maixmino Scott MD [facility] [place]  [facility] follow up    In 3 weeks [facility] RM[facility] Ul[facility]ound [facility]     In 3 weeks Belinda Yarbrough DPM [facility], [address], [place] Diabetic care          Recent Outpatient Visits              5 days ago Type 2 diabetes mellitus with hyperosmolarity without coma, without long-term current use of insulin (HCC)    [facility] Aram Jennings MD    Office Visit    3 months ago Psoriasis    [facility]    3 months ago Annual physical exam    [facility]    [facility]    11 months ago Elevated glucose level    [facility]    [facility]    11 months ago Psoriasis    [facility] [address], [place] Maximino Scott MD    Office Visit

## 2025-01-08 NOTE — TELEPHONE ENCOUNTER
Please review; protocol failed/No Protocol    Patient requesting different pharmacy- was [facility] remaining on script from 09/17/2024 per protocol     Requested Prescriptions   Pending Prescriptions Disp Refills    metFORMIN HCl 1000 MG Oral Tab 180 tablet 3     Sig: Take 1 tablet (1,000 mg total) by mouth 2 (two) times daily with meals.       Diabetes Medication Protocol Failed - 1/8/2025  4:05 PM        Failed - Last A1C < 7.5 and within past 6 months     Lab Results   Component Value Date    A1C 12.3 (H) 01/03/2025             Passed - In person appointment or virtual visit in the past 6 mos or appointment in next 3 mos     Recent Outpatient Visits              5 days ago Type 2 diabetes mellitus with hyperosmolarity without coma, without long-term current use of insulin (HCC)    [facility]    [facility]    3 months ago Psoriasis    [facility] Maximino Scott MD    Office Visit    3 months ago Annual physical exam    [facility]    [facility]    11 months ago Elevated glucose level    [facility]    [facility]    11 months ago Psoriasis    [facility] [address], [place] Maximino Scott MD    Office Visit          Future Appointments         Provider Department Appt Notes    In 5 days Mimi Clark RN [place] [facility] [place] DS[facility] Initial    In 6 days Maximino Scott MD [facility] follow up    In 3 weeks [address] Ultrasound [facility]     In 3 weeks Belinda Yarbrough DPM [facility], [address], [place] Diabetic care                    Passed - Microalbumin procedure in past 12 months or taking ACE/ARB         Passed - EGFRCR or GFRNAA > 50     GFR Evaluation  EGFRCR: 93 , resulted on 9/21/2024          Passed - GFR in the past 12 months        Passed - Medication is active on med list          folic acid 1 MG Oral Tab 90 tablet 3     Sig: Take 1 tablet (1 mg total) by mouth daily.       There is no refill protocol information for this order      Signed Prescriptions Disp Refills    Omeprazole 40 MG Oral Capsule Delayed Release 90 capsule 3     Sig: Take 1 capsule (40 mg total) by mouth daily.       Gastrointestional Medication Protocol Passed - 1/8/2025  4:05 PM        Passed - In person appointment or virtual visit in the past 12 mos or appointment in next 3 mos     Recent Outpatient Visits              5 days ago Type 2 diabetes mellitus with hyperosmolarity without coma, without long-term current use of insulin (HCC)    [facility]    [facility]    3 months ago Psoriasis    [facility] Maximino Scott MD    Office Visit    3 months ago Annual physical exam    [facility]    [facility] Visit    11 months ago Elevated glucose level    [facility] Aram Jennings MD    Telemedicine    11 months ago Psoriasis    [facility] [address], [place] Maximino Scott MD    Office Visit          Future Appointments         Provider Department Appt Notes    In 5 days Mimi Clark RN [place] [facility] [place] [facility] Initial    In 6 days Maximino Scott MD [facility] follow up    In 3 weeks [address] Ultrasound [facility]     In 3 weeks Belinda Yarbrough DPM [facility] [address], [place] Diabetic care                    Passed - Medication is active on med list           Future Appointments          Provider Department Appt Notes    In 5 days Mimi Clark RN [place] [facility] [place] [facility] Initial    In 6 days Maximino Scott MD [facility] follow up    In 3 weeks [address] [facility]     In 3 weeks Belinda Yarbrough DPM [facility] [address], [place] Diabetic care          Recent Outpatient Visits              5 days ago Type 2 diabetes mellitus with hyperosmolarity without coma, without long-term current use of insulin (HCC)    [facility], Aram Jennings MD    Office Visit    3 months ago Psoriasis    [facility] Maximino Scott MD    Office Visit    3 months ago Annual physical exam    [facility] Aram Jennings MD    Office Visit    11 months ago Elevated glucose level    [facility] Aram Jennings MD    Telemedicine    11 months ago Psoriasis    [facility] [address], [place] Maximino Scott MD    Office Visit

## 2025-01-08 NOTE — TELEPHONE ENCOUNTER
Folic Acid 1m year supply sent to [facility] on 2024    Metformin 1000m year supply sent to [facility] on 2024    [facility]

## 2025-01-09 RX ORDER — OMEPRAZOLE 40 MG/1
40 CAPSULE, DELAYED RELEASE ORAL DAILY
Qty: 90 CAPSULE | Refills: 3 | Status: SHIPPED | OUTPATIENT
Start: 2025-01-09

## 2025-01-09 RX ORDER — FOLIC ACID 1 MG/1
1 TABLET ORAL DAILY
Qty: 90 TABLET | Refills: 1 | Status: SHIPPED | OUTPATIENT
Start: 2025-01-09

## 2025-01-13 ENCOUNTER — HOSPITAL ENCOUNTER (OUTPATIENT)
Dept: ENDOCRINOLOGY | Age: 55
End: 2025-01-13
Attending: INTERNAL MEDICINE
Payer: MEDICAID

## 2025-01-13 VITALS — WEIGHT: 202 LBS | BODY MASS INDEX: 28 KG/M2

## 2025-01-13 DIAGNOSIS — E11.00 TYPE 2 DIABETES MELLITUS WITH HYPEROSMOLARITY WITHOUT COMA, WITHOUT LONG-TERM CURRENT USE OF INSULIN (HCC): Primary | ICD-10-CM

## 2025-01-13 NOTE — PROGRESS NOTES
Patel Lechuga : 9/15/1970  attended individual initial assessment for Diabetes Education:    Date: 2025         Start time: 8:30 am  End time: 9:40 am     HEMOGLOBIN A1c (% of total Hgb)   Date Value   2024 9.5 (H)     HgbA1C (%)   Date Value   2025 12.3 (H)       Wt Readings from Last 1 Encounters:   25 202 lb       Assessment: Met with patient for initial diabetes education. Patient was diagnosed with type 2 diabetes many years ago .Confirms a strong family medical history related to diabetes. Monitors blood glucose levels fasting daily. Current diabetes regimen consists of metformin and glipizide. Patient reports compliance with the current diabetes medications.Patient voiced concerns with development of diabetes complications. Patient is receptive to education and  agreeable to continue with the diabetes education sessions.     Diet Hx:   Breakfast: protein shake with frozen berries,almond milk and protein powder  Lunch:chicken with veggies  Dinner: Protein smoothie  Smacks:yogurt with berries      Education provided on the below topics:     Diabetes Overview:  Pathophysiology, A1C results and treatment options for diabetes self-management reviewed.   Described basic process and treatment options for diabetes self-management.  Introduced long term impact of uncontrolled blood glucose on health.    Healthy Eating:  Obtained usual diet history.  Instructed on Basic Diet Guidelines which includes eating 3 meals/day. Eat moderate amounts at consistent times from day to day. Limit/avoid sweets. Instructed on Balanced Plate Method of meal planning. Instructed to limit grains or starchy vegetables to ¼ of plate, protein to ¼ of plate, non-starchy vegetables to ½ plate and modest portions of fruit, yogurt, milk as desired.    Being Active:   Encouraged Physical Activity and briefly reviewed ADA recommended guidelines for activity with type 2 diabetes.    Taking Medications:   Reviewed current  diabetes medications.    Monitoring:  Instructed/reinforced blood glucose monitoring, testing schedules and target goals:   Fasting / Pre-meal  mg/dL 2 Hour Post-prandial <180 mg/dL  Demonstrated ability to perform blood glucose testing.     Problem Solving:   Prevention, detection and treatment of acute complications: taught symptoms of hypoglycemia, hyperglycemia, how to treat low blood sugar (Rule of 15) and actions for lowering high blood glucose levels.     Behavior Change:  Initiated individualized goal setting process and will continue to refine throughout class series.    Recommendations:      Monitor blood glucose as directed.  Follow Balance Plate method of meal planning.   Attend all Group Class in series         Written materials provided for all areas covered.  Patient verbalized understanding and all questions answered.    Mimi Clark RN

## 2025-01-14 ENCOUNTER — OFFICE VISIT (OUTPATIENT)
Dept: DERMATOLOGY CLINIC | Facility: CLINIC | Age: 55
End: 2025-01-14
Payer: MEDICAID

## 2025-01-14 DIAGNOSIS — Z51.81 MEDICATION MONITORING ENCOUNTER: ICD-10-CM

## 2025-01-14 DIAGNOSIS — L40.9 PSORIASIS: Primary | ICD-10-CM

## 2025-01-14 PROCEDURE — 99214 OFFICE O/P EST MOD 30 MIN: CPT | Performed by: STUDENT IN AN ORGANIZED HEALTH CARE EDUCATION/TRAINING PROGRAM

## 2025-01-14 RX ORDER — CLOBETASOL PROPIONATE 0.5 MG/G
OINTMENT TOPICAL
Qty: 60 G | Refills: 5 | Status: SHIPPED | OUTPATIENT
Start: 2025-01-14

## 2025-01-14 NOTE — PROGRESS NOTES
January 14, 2025    Established patient        CHIEF COMPLAINT: Psoriasis F/U    HISTORY OF PRESENT ILLNESS: Patel Lechuga is a 54 year old male here for evaluation of rash.    Location: Elbows, Hands, Knees, Scalp  Duration: Months   Signs and symptoms: Notes Improvement; no side effects/concerns   Current treatment: Skyrizi, Clobetasol   Past treatments: Consentyx      Personal Dermatologic History  History of chronic skin disease: Yes    Family History  History of chronic skin disease: No  History autoimmune diseases:  No    Past Medical History  Past Medical History:    Depression    Diabetes (HCC)    Eczema    Hyperlipidemia    Pancreatitis (HCC)    Psoriasis       REVIEW OF SYSTEMS:  Constitutional: Denies fever, chills, unintentional weight loss.   Skin as per HPI    Medications  Current Outpatient Medications   Medication Sig Dispense Refill    Risankizumab-rzaa (SKYRIZI PEN) 150 MG/ML Subcutaneous Solution Auto-injector Inject 150 mg into the skin every 3 (three) months. 1 mL 3    metFORMIN HCl 1000 MG Oral Tab Take 1 tablet (1,000 mg total) by mouth 2 (two) times daily with meals. 180 tablet 1    folic acid 1 MG Oral Tab Take 1 tablet (1 mg total) by mouth daily. 90 tablet 1    Omeprazole 40 MG Oral Capsule Delayed Release Take 1 capsule (40 mg total) by mouth daily. 90 capsule 3    clobetasol 0.05 % External Ointment Apply twice daily  Monday-Friday. Take weekends off. Use on hands and elbows. 45 g 2    glipiZIDE ER 10 MG Oral Tablet 24 Hr Take 1 tablet (10 mg total) by mouth daily with breakfast. 90 tablet 1    ARIPiprazole 5 MG Oral Tab Take 1 tablet (5 mg total) by mouth daily.      lamoTRIgine 100 MG Oral Tab Take 1 tablet (100 mg total) by mouth 2 (two) times daily.      Sildenafil Citrate 100 MG Oral Tab Take 1 tablet (100 mg total) by mouth daily as needed for Erectile Dysfunction. 18 tablet 1    PARoxetine 20 MG Oral Tab Take 1 tablet (20 mg total) by mouth at bedtime.         PHYSICAL  EXAM:  General: awake, alert, no acute distress  Skin: Skin exam was performed today including the following: trunk and extremities. Pertinent findings include:   - with small scaly plaques    ASSESSMENT & PLAN:  Pathophysiology of diagnoses discussed with patient.  Therapeutic options reviewed. Risks, benefits, and alternatives discussed with patient. Instructions reviewed at length.    #Psoriasis - improved on Skyrizi  #Psoriatic arthritis  - Continue Skyrizi every 3 months  - clobetasol 0.05% twice daily to affected areas Monday-Friday. Take weekends off. Avoid use on face, breasts, groin, or axillae.    High risk medication monitoring   -TB screening - repeat annually  -No live vaccines  -Flu shot yearly      Return to clinic: 1 year or sooner if something concerning arises    Maximino Scott MD

## 2025-01-30 ENCOUNTER — OFFICE VISIT (OUTPATIENT)
Dept: PODIATRY CLINIC | Facility: CLINIC | Age: 55
End: 2025-01-30

## 2025-01-30 ENCOUNTER — HOSPITAL ENCOUNTER (OUTPATIENT)
Dept: ULTRASOUND IMAGING | Facility: HOSPITAL | Age: 55
Discharge: HOME | End: 2025-01-30
Attending: INTERNAL MEDICINE
Payer: MEDICAID

## 2025-01-30 DIAGNOSIS — E11.42 DIABETIC PERIPHERAL NEUROPATHY (HCC): Primary | ICD-10-CM

## 2025-01-30 DIAGNOSIS — R79.89 ELEVATED LFTS: ICD-10-CM

## 2025-01-30 PROCEDURE — 76981 USE PARENCHYMA: CPT | Performed by: INTERNAL MEDICINE

## 2025-01-30 PROCEDURE — 99204 OFFICE O/P NEW MOD 45 MIN: CPT | Performed by: PODIATRIST

## 2025-01-30 PROCEDURE — 76705 ECHO EXAM OF ABDOMEN: CPT | Performed by: INTERNAL MEDICINE

## 2025-01-30 NOTE — PROGRESS NOTES
Reason for Visit      Patel Lechuga is a 54 year old male presents today complaining of bilateral diabetic foot evaluation.     History of Present Illness     Patient presents to clinic today for routine diabetic foot evaluation.  Patient last saw his primary care physician on 1/3/2025 and has been seeing dermatology for management of his psoriasis.  Patient is a non-insulin-dependent diabetic type II whose last A1c was 12.3 on 1/3/2025.  Patient has no issues at this time and is only referred by his endocrinologist for evaluation.    The following portions of the patient's history were reviewed and updated as appropriate: allergies, current medications, past family history, past medical history, past social history, past surgical history and problem list.    Allergies[1]      Current Outpatient Medications:     clobetasol 0.05 % External Ointment, Apply twice daily  Monday-Friday. Take weekends off. Use on hands and elbows., Disp: 60 g, Rfl: 5    Risankizumab-rzaa (SKYRIZI PEN) 150 MG/ML Subcutaneous Solution Auto-injector, Inject 150 mg into the skin every 3 (three) months., Disp: 1 mL, Rfl: 3    metFORMIN HCl 1000 MG Oral Tab, Take 1 tablet (1,000 mg total) by mouth 2 (two) times daily with meals., Disp: 180 tablet, Rfl: 1    folic acid 1 MG Oral Tab, Take 1 tablet (1 mg total) by mouth daily., Disp: 90 tablet, Rfl: 1    Omeprazole 40 MG Oral Capsule Delayed Release, Take 1 capsule (40 mg total) by mouth daily., Disp: 90 capsule, Rfl: 3    glipiZIDE ER 10 MG Oral Tablet 24 Hr, Take 1 tablet (10 mg total) by mouth daily with breakfast., Disp: 90 tablet, Rfl: 1    Sildenafil Citrate 100 MG Oral Tab, Take 1 tablet (100 mg total) by mouth daily as needed for Erectile Dysfunction., Disp: 18 tablet, Rfl: 1    There are no discontinued medications.    Patient Active Problem List   Diagnosis    History of diabetes mellitus    Erectile dysfunction    Rash in adult    Type 2 diabetes mellitus without complication, without  long-term current use of insulin (HCC)    EKG, abnormal    Vision problems    Hyperlipidemia    Major depression    Severe recurrent major depression without psychotic features (HCC)    Alcohol use disorder, severe, dependence (HCC)    Stimulant use disorder    Alcohol abuse, in remission    History of drug abuse in remission (HCC)    Psoriasis    Smoker    History of depression    Tiredness    Acute pancreatitis, unspecified complication status, unspecified pancreatitis type (HCC)    Gastroesophageal reflux disease    Biliary sludge    History of pancreatitis    Dyspepsia    Encounter for colorectal cancer screening    Primary hypertension    Depression with anxiety    Type 2 diabetes mellitus with hyperosmolarity without coma, without long-term current use of insulin (HCC)       Past Medical History:    Depression    Diabetes (HCC)    Eczema    Hyperlipidemia    Pancreatitis (HCC)    Psoriasis       Past Surgical History:   Procedure Laterality Date    Colonoscopy screening - referral N/A 07/25/2022    Procedure: COLONOSCOPY-SCREENING/ ESOPHAGOGASTRODUODENOSCOPY (EGD)/ENDOSCOPIC ULTRASOUND with popssible Fine needle aspiration;  Surgeon: Eliezer Roth MD;  [facility] ENDOSCOPY    Hernia surgery         Family History   Problem Relation Age of Onset    Diabetes Mother     Cancer Sister         breast cancer    No Known Problems Brother     No Known Problems Brother     No Known Problems Brother     Cancer Sister        Social History     Occupational History    Not on file   Tobacco Use    Smoking status: Former     Current packs/day: 0.00     Types: Cigarettes     Passive exposure: Current    Smokeless tobacco: Never    Tobacco comments:     3-4 a day    Vaping Use    Vaping status: Every Day    Substances: Nicotine    Devices: Disposable   Substance and Sexual Activity    Alcohol use: Not Currently     Comment: drank 12 beers Sunday night (7-). He also reported having 5 beers last night and also  used cocaine. He was previously sober for 22 months.    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 relapsed sunday used crack cocaine on 7-18-24  ( $50.00 of cocaine)    Sexual activity: Not on file       ROS      Constitutional: negative for chills, fevers and sweats  Gastrointestinal: negative for abdominal pain, diarrhea, nausea and vomiting  Genitourinary:negative for dysuria and hematuria  Musculoskeletal:negative for arthralgias and muscle weakness  Neurological: negative for paresthesia and weakness  All others reviewed and negative.      Physical Exam     LE PHYSICAL EXAM    Constitution: Well-developed and well-nourished. Gait appears normal. No apparent distress. Alert and oriented to person, place, and time.  Integument: There are no varicosities. Skin appears moist, warm, and supple with positive hair growth. There are no color changes. No open lesions. No macerations, No Hyperkeratotic lesions.  Vascular examination: Dorsalis pedis and posterior tibial pulses are strong bilaterally with capillary filling time less than 3 seconds to all digits. There is no peripheral edema..  Neurological Sensorium: Grossly intact to sharp/dull. Vibratory: Intact.  Musculoskeletal:   5/5 pedal muscle strength b/l     Advanced trophic changes as: hair growth decrease =nail changes  (thickening) pigmentary changes (discoloration) skin texture (thin, shiny)     Assessment and Plan     Encounter Diagnoses   Name Primary?    Diabetic peripheral neuropathy (HCC) Yes   Diabetic education and instructions have been provided. We reviewed and discussed the following:    -risk categories related to pts with diabetes and foot or lower extremity complications per ADA.    -adherence to medication regimen and close monitoring or blood sugar control.   -daily monitoring/inspection of feet and shoes.   -proper management of diet and weight   -regular follow up with PCP and  specialty providers as recommended   -Lower extremity complications related to DM were reviewed and stressed prevention.         Patient was instructed to call the office or on-call podiatric physician immediately with any issues or concerns before the next scheduled visit. Patient to follow-up in clinic in 1 year      Belinda Zhu DPM, LIBBY.EVETRON FACAGGIE  Diplomat, American Board of Foot and Ankle Surgery  Certified in Foot and Rearfoot/Ankle Reconstruction  Fellow of the American College of Foot and Ankle Surgeons  Fellowship Trained Foot and Ankle Surgeon   [facility]     1/30/2025    9:02 AM         [1]   Allergies  Allergen Reactions    Cat Hair Extract ITCHING    Dander ITCHING      Cat dander: Watery eyes, sneezing

## 2025-02-02 DIAGNOSIS — K74.00 FIBROSIS OF LIVER: ICD-10-CM

## 2025-02-02 DIAGNOSIS — K76.0 FATTY LIVER: Primary | ICD-10-CM

## 2025-02-04 NOTE — H&P
[facility] - Gastroenterology                                                                                                               Reason for consult: eval    Requesting physician or provider: Aram Ann MD    No chief complaint on file.      HPI:   Patel Lechuga is a 54 year old year-old male with history of  depression, dm, eczema, hld, pancreatitis, psoriasis:     he is here today for f/U  He is here today with his friend    Last seen by me 2022    H/o pancreatitis thought to be 2/2 etoh, tobacco use.   Triglycerides 841-->256  hgb A1C 8.0  IgG subclasses unremarkable     RUQ ultrasound shows hepatic steatosis and moderate biliary sludge with prominent CBD 6.3 mm however follow-up MRCP without definite biliary ductal dilation and no gallstones or wall thickening.      Recommendation at discharge  Continue ppi 40mg BID for dyspepsia symptoms  Plan for gi follow-up in clinic to discuss outpatient EGD (chronic dyspepsia)   +/- EUS as well as average risk CRC screening     Plan after last visit  -smoking cessation  -avoid alcohol  -er if condition decline  -omeprazole 40 mg/daily  -reflux diet modification     S/p Schedule colonoscopy/egd/eus w/ Dr. Roth 7/2022  Final Diagnosis:      A. Gastric biopsy:  Gastric antral and oxyntic mucosa with chronic inactive gastritis.  Negative for intestinal metaplasia or dysplasia.  Diff-Quik stain (with appropriate control reactivity), is negative for H. pylori microorganisms.     B. Transverse colon polyps x2:  Tubular adenoma fragments x6.  Hyperplastic polyp fragments x3.     Comment: Immunohistochemical stain for cytokeratin AE1/3 performed on block A1 shows no evidence of an infiltrative epithelial process, supporting the above diagnosis.     3 y crc screening interval advised    Drug abuse and dependence (HCC) - pt recently was using again now obstaining since last week follows with AA     Ct a/p 5/26/24         Impression   CONCLUSION:  1. Imaging findings concerning for mild acute interstitial edematous pancreatitis involving the uncinate process and pancreatic head. There is no loculated acute peripancreatic fluid collection.     2. Possible hepatic steatosis.     3. Lesser incidental findings as above.       Elastography 1/30/25  Impression   CONCLUSION:  1. Hepatic steatosis versus diffuse hepatocellular disease redemonstrated.  2. F3 Metavir score:  Moderate-severe liver fibrosis staging.  3. Normal gallbladder ultrasound.  Normal common duct.  4. No free fluid hepatorenal fossa. No hydronephrosis right kidney.           he moves his bowels daily and without recent change. he denies straining and/or incomplete evacuation.  he denies brbpr.  Stools dark at times.  Has had normal color stool yesterday.  No dizziness, lightheadedness, fatigue.     he denies acid reflux and/or heartburn while on omeprazole 40 mg/daily. he denies dysphagia, odynophagia and/or globus. he denies abdominal pain. he has nausea in am at times. No vomiting,   appetite variable. No unintentional weight loss.      He denies suicidal and/or homicidal ideation    NSAIDS: no  Tobacco: current - trying to quit   Alcohol:  abstinent x last 30 days  Illicit drugs: no  Acetaminophen: no   Herbals, green tea: no    No FH GI malignancy  No FH pancreatic/biliary disease     Last colonoscopy: no  Last EGD: no    Wt Readings from Last 6 Encounters:   02/05/25 207 lb (93.9 kg)   01/13/25 202 lb (91.6 kg)   01/03/25 199 lb (90.3 kg)   09/17/24 227 lb (103 kg)   07/17/24 205 lb (93 kg)   05/26/24 210 lb (95.3 kg)        History, Medications, Allergies, ROS:      Past Medical History:    Depression    Diabetes (HCC)    Eczema    Hyperlipidemia    Pancreatitis (HCC)    Psoriasis      Past Surgical History:   Procedure Laterality Date    Colonoscopy screening - referral N/A 07/25/2022    Procedure: COLONOSCOPY-SCREENING/ ESOPHAGOGASTRODUODENOSCOPY  (EGD)/ENDOSCOPIC ULTRASOUND with popssible Fine needle aspiration;  Surgeon: Eliezer Roth MD;  Location: [facility] ENDOSCOPY    Hernia surgery        Family Hx:   Family History   Problem Relation Age of Onset    Diabetes Mother     Cancer Sister         breast cancer    No Known Problems Brother     No Known Problems Brother     No Known Problems Brother       Social History:   Social History     Socioeconomic History    Marital status: Single   Tobacco Use    Smoking status: Former     Current packs/day: 0.00     Types: Cigarettes     Passive exposure: Current    Smokeless tobacco: Never    Tobacco comments:     3-4 a day    Vaping Use    Vaping status: Every Day    Substances: Nicotine    Devices: Disposable   Substance and Sexual Activity    Alcohol use: Not Currently     Comment: drank 12 beers Sunday night (7-). He also reported having 5 beers last night and also used cocaine. He was previously sober for 22 months.    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 relapsed sunday used crack cocaine on 7-18-24  ( $50.00 of cocaine)   Other Topics Concern    [place]rew up on a farm No    History of tanning No    Outdoor occupation No    Reaction to local anesthetic No    Caffeine Concern Yes    Exercise No    Pt has a pacemaker No    Pt has a defibrillator No   Social History Narrative    The patient does not use an assistive device..      The patient does live in a home with stairs.     Social Drivers of Health     Food Insecurity: No Food Insecurity (5/26/2024)    Food Insecurity     Food Insecurity: Never true   Transportation Needs: No Transportation Needs (5/26/2024)    Transportation Needs     Lack of Transportation: No   Housing Stability: Low Risk  (5/26/2024)    Housing Stability     Housing Instability: No        Medications (Active prior to today's visit):  Current Outpatient Medications   Medication Sig Dispense Refill    PEG 3350-KCl-Na  Bicarb-NaCl (TRILYTE) 420 g Oral Recon Soln Take prep as directed by gastro office. May substitute with Trilyte/generic equivalent if needed. 4000 mL 0    clobetasol 0.05 % External Ointment Apply twice daily  Monday-Friday. Take weekends off. Use on hands and elbows. 60 g 5    Risankizumab-rzaa (SKYRIZI PEN) 150 MG/ML Subcutaneous Solution Auto-injector Inject 150 mg into the skin every 3 (three) months. 1 mL 3    metFORMIN HCl 1000 MG Oral Tab Take 1 tablet (1,000 mg total) by mouth 2 (two) times daily with meals. 180 tablet 1    folic acid 1 MG Oral Tab Take 1 tablet (1 mg total) by mouth daily. 90 tablet 1    Omeprazole 40 MG Oral Capsule Delayed Release Take 1 capsule (40 mg total) by mouth daily. 90 capsule 3    glipiZIDE ER 10 MG Oral Tablet 24 Hr Take 1 tablet (10 mg total) by mouth daily with breakfast. 90 tablet 1    Sildenafil Citrate 100 MG Oral Tab Take 1 tablet (100 mg total) by mouth daily as needed for Erectile Dysfunction. 18 tablet 1       Allergies:  Allergies[1]    ROS:   CONSTITUTIONAL: negative for fevers, chills, sweats and weight loss  EYES Negative for red eyes, yellow eyes, changes in vision  HEENT: Negative for dysphagia and hoarseness  RESPIRATORY: Negative for cough and shortness of breath  CARDIOVASCULAR: Negative for chest pain, palpitations  GASTROINTESTINAL: See HPI  GENITOURINARY: Negative for dysuria and frequency  MUSCULOSKELETAL: Negative for arthralgias and myalgias  NEUROLOGICAL: Negative for dizziness and headaches  BEHAVIOR/PSYCH: Negative for anxiety and poor appetite    PHYSICAL EXAM:   Blood pressure 118/72, pulse 82, height 5' 11\" (1.803 m), weight 207 lb (93.9 kg).    GEN: WD/WN, NAD  HEENT: Supple symmetrical, trachea midline  CV: RRR, the extremities are warm and well perfused   LUNGS: No increased work of breathing  ABDOMEN: No scars, normal bowel sounds, soft, non-tender, non-distended no rebound or guarding, no masses, no hepatomegaly  MSK: No redness, no warmth, no  swelling of joints  SKIN: No jaundice, no erythema, no rashes  HEMATOLOGIC: No bleeding, no bruising  NEURO: Alert and interactive, normal gait    Labs/Imaging/Procedures:     Patient's pertinent labs and imaging were reviewed and discussed with patient today.        .  ASSESSMENT/PLAN:   Patel Lechuga is a 54 year old year-old male with history of  depression, dm, eczema, hld, pancreatitis, psoriasis:     #crc screening  Due 7/2025    #pancreatitis  Recurrent pancreatitis likely 2/2 tobacco, etoh use. EUS 7/2022 w/o concerning finding.  Advise abstinence from etoh, tobacco.    #hepatic steatosis  Advanced fibrosis (F3) on elastography, Suspect is related to alcohol abuse, however will order chronic liver disease w/U.  Plan for referral to hep to consider biopsy.     #gerd  #nausea  #dark stools  Gerd controlled on omeprazole.  Has had intermittent dark stools. No sob, dizziness, fatigue.  CBC 9/2024 normal. Has had normal colon stool yesterday.  Last egd 2022 and think reasonable to repeat at time of cln given advanced liver disease/ev screening.     -avoid tobacco, alcohol, hepatotoxic agents  -c-scope 7/2025 unless otherwise indicated  -low-fat diet  -healthy bmi  -monitor cholesterol, blood sugar with pcp  -hepatology referral  -reflux diet modification  -continue omeprazole in am   -add pepcid in evening  -er if condition decline      -see Hepatology (liver MD)  Dr. Juan Barksdale   T. [phone]  Or  Dr. Hui Saunders   T. [phone]     1. Schedule colonoscopy/egd with MAC w/ General pool MD [Diagnosis:crc screening, gerd, nausea, advanced liver disease ]    2.  bowel prep from pharmacy (split trilyte )    3. Hold metformin and glipizide day before and am of procedure  For cardiology patients and patients on blood thinners:  Please contact your cardiology clinic for clearance to proceed with the endoscopic procedure. If you are on blood thinners, please also confirm with your cardiologic clinic that  you are able to hold the blood thinner per our recommendations.\"    BLOOD THINNER ORDERS:  -Hold for 48 hours (Xarelto, Eliquis, Pradaxa, Savaysa)  -Hold for 3 days (Pletal)  -Hold for 5 days (Coumadin, Plavix, Brilinta, Aggrenox)  -Hold for 7 days (Effient)     For endocrinology insulin patients:    Please contact your endocrinology clinic for insulin adjustment orders prior to your endoscopic procedure.    4. Read all bowel prep instructions carefully    5. AVOID seeds, nuts, popcorn, raw fruits and vegetables (cooked is okay) for 2-3 days before procedure    6.   If you start any NEW medication after your visit today, please notify us. Certain medications will need to be held before the procedure, or the procedure cannot be performed.     >>>Please note: if you were prescribed Suprep for the bowel prep and it is too expensive or not covered by insurance, it is okay to substitute Trilyte (or any similar generic prep). This can be done by notifying the pharmacy or calling our office.      Orders This Visit:  Orders Placed This Encounter   Procedures    Actin (Smooth Muscle) Antibody    Alpha-1-antirypsin, serum    AFP, Tumor Marker, Serum    Ceruloplasmin    Ferritin    HCV Antibody    Hep A AB, Total    Mitochondrial (M2) Antibody    Iron And Tibc    Immunoglobulin A/G/M, Quant    Hepatitis B Surface Antigen    Hepatitis B Surface Antibody    Hepatic Function Panel (7)    CBC W Differential W Platelet       Meds This Visit:  Requested Prescriptions     Signed Prescriptions Disp Refills    PEG 3350-KCl-Na Bicarb-NaCl (TRILYTE) 420 g Oral Recon Soln 4000 mL 0     Sig: Take prep as directed by gastro office. May substitute with Trilyte/generic equivalent if needed.       Imaging & Referrals:  HEPATOLOGY - INTERNAL    ENDOSCOPIC RISK BENEFIT DISCUSSION: I described the procedure in great detail with the patient. I discussed the risks and benefits, including but not limited to: bleeding, perforation, infection,  anesthesia complications, and even death. Patient will be NPO after midnight and will have a person physically present at time of pick-up to drive patient home. Patient verbalized understanding and agrees to proceed with procedure as planned.    Radha Holley, APRN   2/4/2025        This note was partially prepared using Dragon Medical voice recognition dictation software. As a result, errors may occur. When identified, these errors have been corrected. While every attempt is made to correct errors during dictation, discrepancies may still exist.          [1]   Allergies  Allergen Reactions    Cat Hair Extract ITCHING    Dander ITCHING      Cat dander: Watery eyes, sneezing

## 2025-02-05 ENCOUNTER — TELEPHONE (OUTPATIENT)
Dept: GASTROENTEROLOGY | Facility: CLINIC | Age: 55
End: 2025-02-05

## 2025-02-05 ENCOUNTER — OFFICE VISIT (OUTPATIENT)
Dept: GASTROENTEROLOGY | Facility: CLINIC | Age: 55
End: 2025-02-05

## 2025-02-05 ENCOUNTER — LAB ENCOUNTER (OUTPATIENT)
Dept: LAB | Age: 55
End: 2025-02-05
Attending: NURSE PRACTITIONER
Payer: MEDICAID

## 2025-02-05 VITALS
DIASTOLIC BLOOD PRESSURE: 72 MMHG | HEIGHT: 71 IN | HEART RATE: 82 BPM | SYSTOLIC BLOOD PRESSURE: 118 MMHG | BODY MASS INDEX: 28.98 KG/M2 | WEIGHT: 207 LBS

## 2025-02-05 DIAGNOSIS — R11.0 NAUSEA: ICD-10-CM

## 2025-02-05 DIAGNOSIS — R19.5 DARK STOOLS: ICD-10-CM

## 2025-02-05 DIAGNOSIS — K85.20 ALCOHOL-INDUCED ACUTE PANCREATITIS WITHOUT INFECTION OR NECROSIS (HCC): ICD-10-CM

## 2025-02-05 DIAGNOSIS — K76.0 HEPATIC STEATOSIS: ICD-10-CM

## 2025-02-05 DIAGNOSIS — K21.9 GASTROESOPHAGEAL REFLUX DISEASE, UNSPECIFIED WHETHER ESOPHAGITIS PRESENT: ICD-10-CM

## 2025-02-05 DIAGNOSIS — K76.9 LIVER DISEASE: ICD-10-CM

## 2025-02-05 DIAGNOSIS — Z12.11 COLON CANCER SCREENING: Primary | ICD-10-CM

## 2025-02-05 DIAGNOSIS — Z12.11 COLON CANCER SCREENING: ICD-10-CM

## 2025-02-05 DIAGNOSIS — K76.0 HEPATIC STEATOSIS: Primary | ICD-10-CM

## 2025-02-05 LAB
AFP-TM SERPL-MCNC: 4.1 NG/ML
ALBUMIN SERPL-MCNC: 4.5 G/DL (ref 3.2–4.8)
ALP LIVER SERPL-CCNC: 114 U/L
ALT SERPL-CCNC: 60 U/L
AST SERPL-CCNC: 45 U/L (ref ?–34)
BASOPHILS # BLD AUTO: 0.08 X10(3) UL (ref 0–0.2)
BASOPHILS NFR BLD AUTO: 1 %
BILIRUB DIRECT SERPL-MCNC: 0.2 MG/DL (ref ?–0.3)
BILIRUB SERPL-MCNC: 0.6 MG/DL (ref 0.3–1.2)
DEPRECATED HBV CORE AB SER IA-ACNC: 67 NG/ML
DEPRECATED RDW RBC AUTO: 35.8 FL (ref 35.1–46.3)
EOSINOPHIL # BLD AUTO: 0.19 X10(3) UL (ref 0–0.7)
EOSINOPHIL NFR BLD AUTO: 2.4 %
ERYTHROCYTE [DISTWIDTH] IN BLOOD BY AUTOMATED COUNT: 11.1 % (ref 11–15)
HAV AB SER QL IA: REACTIVE
HAV IGM SER QL: NONREACTIVE
HBV SURFACE AB SER QL: REACTIVE
HBV SURFACE AB SERPL IA-ACNC: 22.49 MIU/ML
HBV SURFACE AG SER-ACNC: <0.1 [IU]/L
HBV SURFACE AG SERPL QL IA: NONREACTIVE
HCT VFR BLD AUTO: 46.8 %
HCV AB SERPL QL IA: NONREACTIVE
HGB BLD-MCNC: 16.7 G/DL
IGA SERPL-MCNC: 258.8 MG/DL (ref 40–350)
IGM SERPL-MCNC: 105.3 MG/DL (ref 50–300)
IMM GRANULOCYTES # BLD AUTO: 0.02 X10(3) UL (ref 0–1)
IMM GRANULOCYTES NFR BLD: 0.3 %
IMMUNOGLOBULIN PNL SER-MCNC: 1594 MG/DL (ref 650–1600)
IRON SATN MFR SERPL: 33 %
IRON SERPL-MCNC: 116 UG/DL
LYMPHOCYTES # BLD AUTO: 2.14 X10(3) UL (ref 1–4)
LYMPHOCYTES NFR BLD AUTO: 27.5 %
MCH RBC QN AUTO: 31.2 PG (ref 26–34)
MCHC RBC AUTO-ENTMCNC: 35.7 G/DL (ref 31–37)
MCV RBC AUTO: 87.3 FL
MONOCYTES # BLD AUTO: 0.51 X10(3) UL (ref 0.1–1)
MONOCYTES NFR BLD AUTO: 6.6 %
NEUTROPHILS # BLD AUTO: 4.83 X10 (3) UL (ref 1.5–7.7)
NEUTROPHILS # BLD AUTO: 4.83 X10(3) UL (ref 1.5–7.7)
NEUTROPHILS NFR BLD AUTO: 62.2 %
PLATELET # BLD AUTO: 253 10(3)UL (ref 150–450)
PROT SERPL-MCNC: 7.8 G/DL (ref 5.7–8.2)
RBC # BLD AUTO: 5.36 X10(6)UL
TOTAL IRON BINDING CAPACITY: 356 UG/DL (ref 250–425)
TRANSFERRIN SERPL-MCNC: 281 MG/DL (ref 215–365)
WBC # BLD AUTO: 7.8 X10(3) UL (ref 4–11)

## 2025-02-05 PROCEDURE — 83516 IMMUNOASSAY NONANTIBODY: CPT

## 2025-02-05 PROCEDURE — 82105 ALPHA-FETOPROTEIN SERUM: CPT

## 2025-02-05 PROCEDURE — 86708 HEPATITIS A ANTIBODY: CPT

## 2025-02-05 PROCEDURE — 87340 HEPATITIS B SURFACE AG IA: CPT

## 2025-02-05 PROCEDURE — 83540 ASSAY OF IRON: CPT

## 2025-02-05 PROCEDURE — 84466 ASSAY OF TRANSFERRIN: CPT

## 2025-02-05 PROCEDURE — 86803 HEPATITIS C AB TEST: CPT

## 2025-02-05 PROCEDURE — 82390 ASSAY OF CERULOPLASMIN: CPT

## 2025-02-05 PROCEDURE — 82784 ASSAY IGA/IGD/IGG/IGM EACH: CPT

## 2025-02-05 PROCEDURE — 86706 HEP B SURFACE ANTIBODY: CPT

## 2025-02-05 PROCEDURE — 80076 HEPATIC FUNCTION PANEL: CPT

## 2025-02-05 PROCEDURE — 85025 COMPLETE CBC W/AUTO DIFF WBC: CPT

## 2025-02-05 PROCEDURE — 86709 HEPATITIS A IGM ANTIBODY: CPT

## 2025-02-05 PROCEDURE — 99215 OFFICE O/P EST HI 40 MIN: CPT | Performed by: NURSE PRACTITIONER

## 2025-02-05 PROCEDURE — 36415 COLL VENOUS BLD VENIPUNCTURE: CPT

## 2025-02-05 PROCEDURE — 82728 ASSAY OF FERRITIN: CPT

## 2025-02-05 PROCEDURE — 82103 ALPHA-1-ANTITRYPSIN TOTAL: CPT

## 2025-02-05 RX ORDER — POLYETHYLENE GLYCOL 3350, SODIUM CHLORIDE, SODIUM BICARBONATE, POTASSIUM CHLORIDE 420; 11.2; 5.72; 1.48 G/4L; G/4L; G/4L; G/4L
POWDER, FOR SOLUTION ORAL
Qty: 4000 ML | Refills: 0 | Status: ON HOLD | OUTPATIENT
Start: 2025-02-05

## 2025-02-05 NOTE — PATIENT INSTRUCTIONS
-avoid tobacco, alcohol, hepatotoxic agents  -c-scope 7/2025 unless otherwise indicated  -low-fat diet  -healthy bmi  -monitor cholesterol, blood sugar with pcp  -hepatology referral  -reflux diet modification  -continue omeprazole in am   -add pepcid in evening  -er if condition decline      -see Hepatology (liver MD)  Dr. Juan ABDALLA [phone]  Or  Dr. Hui Saunders   TRafael [phone]     1. Schedule colonoscopy/egd with MAC w/ General pool MD [Diagnosis:crc screening, gerd, nausea, advanced liver disease ]    2.  bowel prep from pharmacy (split trilyte )    3. Hold metformin and glipizide day before and am of procedure  For cardiology patients and patients on blood thinners:  Please contact your cardiology clinic for clearance to proceed with the endoscopic procedure. If you are on blood thinners, please also confirm with your cardiologic clinic that you are able to hold the blood thinner per our recommendations.\"    BLOOD THINNER ORDERS:  -Hold for 48 hours (Xarelto, Eliquis, Pradaxa, Savaysa)  -Hold for 3 days (Pletal)  -Hold for 5 days (Coumadin, Plavix, Brilinta, Aggrenox)  -Hold for 7 days (Effient)     For endocrinology insulin patients:    Please contact your endocrinology clinic for insulin adjustment orders prior to your endoscopic procedure.    4. Read all bowel prep instructions carefully    5. AVOID seeds, nuts, popcorn, raw fruits and vegetables (cooked is okay) for 2-3 days before procedure    6.   If you start any NEW medication after your visit today, please notify us. Certain medications will need to be held before the procedure, or the procedure cannot be performed.     >>>Please note: if you were prescribed Suprep for the bowel prep and it is too expensive or not covered by insurance, it is okay to substitute Trilyte (or any similar generic prep). This can be done by notifying the pharmacy or calling our office.      Tips to Control Acid Reflux    To control acid reflux, you’ll  need to make some basic diet and lifestyle changes. The simple steps outlined below may be all you’ll need to ease discomfort.   Watch what you eat  Don't have fatty foods or spicy foods.  Eat fewer acidic foods, such as citrus and tomato-based foods. These can increase symptoms.  Limit drinking alcohol, caffeine, and fizzy beverages. All increase acid reflux.  Try limiting chocolate, peppermint, and spearmint. These can make acid reflux worse in some people.     Watch when you eat  Don't lie down for 3 hours after eating.  Don't snack before going to bed.     Raise your head  Raising your head and upper body by 4 to 6 inches helps limit reflux when you’re lying down. Put blocks under the head of your bed frame or a wedge under your mattress to raise it.   Other changes  Lose weight, if you need to  Don’t exercise near bedtime  Don't wear tight-fitting clothes  Limit aspirin and ibuprofen  Stop smoking     [employer] last reviewed this educational content on 6/1/2019 © 2000-2020 [employer]. [address] [place] [address], [place] [address]. All rights reserved. This information is not intended as a substitute for professional medical care. Always follow your healthcare professional's instructions.

## 2025-02-05 NOTE — TELEPHONE ENCOUNTER
Schedulers, see providers orders below:     -Patient was seen in office today with Radha and was provided with written and verbal procedure prep instructions, including any medication adjustments.   -Patient was advised to call medical insurance for any questions on benefits and/or any out of pocket costs.   -Patient is aware that the GI schedulers will be calling to schedule procedure(s).      1. Schedule colonoscopy/egd with MAC w/ General pool MD [Diagnosis:crc screening, gerd, nausea, advanced liver disease ]     2.  bowel prep from pharmacy (split trilyte )     3. Hold metformin and glipizide day before and am of procedure    For cardiology patients and patients on blood thinners:  Please contact your cardiology clinic for clearance to proceed with the endoscopic procedure. If you are on blood thinners, please also confirm with your cardiologic clinic that you are able to hold the blood thinner per our recommendations.\"     BLOOD THINNER ORDERS:  -Hold for 48 hours (Xarelto, Eliquis, Pradaxa, Savaysa)  -Hold for 3 days (Pletal)  -Hold for 5 days (Coumadin, Plavix, Brilinta, Aggrenox)  -Hold for 7 days (Effient)      For endocrinology insulin patients:     Please contact your endocrinology clinic for insulin adjustment orders prior to your endoscopic procedure.     4. Read all bowel prep instructions carefully     5. AVOID seeds, nuts, popcorn, raw fruits and vegetables (cooked is okay) for 2-3 days before procedure     6.   If you start any NEW medication after your visit today, please notify us. Certain medications will need to be held before the procedure, or the procedure cannot be performed.

## 2025-02-06 ENCOUNTER — APPOINTMENT (OUTPATIENT)
Dept: ENDOCRINOLOGY | Facility: HOSPITAL | Age: 55
End: 2025-02-06
Attending: INTERNAL MEDICINE
Payer: MEDICAID

## 2025-02-06 LAB
A-1-ANTITRYPSIN: 157 MG/DL
ACTIN SMOOTH MUSCLE AB: 5 UNITS
CERULOPLASMIN SERPL-MCNC: 24 MG/DL (ref 20–60)
M2 MITOCHONDRIAL AB: <20 UNITS

## 2025-02-06 NOTE — TELEPHONE ENCOUNTER
Scheduled for:  Colonoscopy 06872; EGD 16827  Provider Name: Dr. Erwin  Date:  5/5/2025  Location:   [facility]  Sedation:  MAC  Time:  2:00 PM - Patient is aware [facility] will call the day before with arrival time.  Prep:  Trilyte  Meds/Allergies Reconciled?:  APN reviewed   Diagnosis with codes:  Colon cancer screening Z12.11; GERD K21.9; Nausea R11.0; Advanced liver disease K76.9  Was patient informed to call insurance with codes (Y/N):  Yes, I confirmed MEDICAID REPLACEMENT insurance with the patient.   Referral sent?:  Referral was sent at the time of electronic surgical scheduling.   EM or [facility] notified?:  I sent an electronic request to Endo Scheduling and received a confirmation today.   Medication Orders: Hold Metformin/Glipizide the day before and day of procedure. Hold Sildenafil 3 days prior to procedure. Hold multivitamins/supplements one week prior to procedure.  Misc Orders:  N/A     Further instructions given by staff:   I discussed the prep instructions with the patient which he verbally understood and is aware that I will send the instructions today.

## 2025-02-07 ENCOUNTER — TELEPHONE (OUTPATIENT)
Facility: CLINIC | Age: 55
End: 2025-02-07

## 2025-02-07 NOTE — TELEPHONE ENCOUNTER
Radha-    Please provide result note for labs completed on 2/5/2025 and I can review this with the patient    Thank you

## 2025-02-09 ENCOUNTER — HOSPITAL ENCOUNTER (INPATIENT)
Facility: HOSPITAL | Age: 55
LOS: 1 days | Discharge: HOME | End: 2025-02-11
Attending: STUDENT IN AN ORGANIZED HEALTH CARE EDUCATION/TRAINING PROGRAM | Admitting: STUDENT IN AN ORGANIZED HEALTH CARE EDUCATION/TRAINING PROGRAM
Payer: MEDICAID

## 2025-02-09 ENCOUNTER — APPOINTMENT (OUTPATIENT)
Dept: CT IMAGING | Facility: HOSPITAL | Age: 55
End: 2025-02-09
Attending: STUDENT IN AN ORGANIZED HEALTH CARE EDUCATION/TRAINING PROGRAM
Payer: MEDICAID

## 2025-02-09 DIAGNOSIS — K85.90 ACUTE PANCREATITIS WITHOUT INFECTION OR NECROSIS, UNSPECIFIED PANCREATITIS TYPE (HCC): Primary | ICD-10-CM

## 2025-02-09 LAB
ALBUMIN SERPL-MCNC: 4.6 G/DL (ref 3.2–4.8)
ALBUMIN/GLOB SERPL: 1.6 {RATIO} (ref 1–2)
ALP LIVER SERPL-CCNC: 116 U/L
ALT SERPL-CCNC: 41 U/L
ANION GAP SERPL CALC-SCNC: 7 MMOL/L (ref 0–18)
AST SERPL-CCNC: 30 U/L (ref ?–34)
BASOPHILS # BLD AUTO: 0.05 X10(3) UL (ref 0–0.2)
BASOPHILS NFR BLD AUTO: 0.7 %
BILIRUB SERPL-MCNC: 0.6 MG/DL (ref 0.3–1.2)
BILIRUB UR QL: NEGATIVE
BUN BLD-MCNC: 11 MG/DL (ref 9–23)
BUN/CREAT SERPL: 8.9 (ref 10–20)
CALCIUM BLD-MCNC: 9.5 MG/DL (ref 8.7–10.4)
CHLORIDE SERPL-SCNC: 96 MMOL/L (ref 98–112)
CLARITY UR: CLEAR
CO2 SERPL-SCNC: 26 MMOL/L (ref 21–32)
COLOR UR: YELLOW
CREAT BLD-MCNC: 1.24 MG/DL
DEPRECATED RDW RBC AUTO: 34.6 FL (ref 35.1–46.3)
EGFRCR SERPLBLD CKD-EPI 2021: 70 ML/MIN/1.73M2 (ref 60–?)
EOSINOPHIL # BLD AUTO: 0.19 X10(3) UL (ref 0–0.7)
EOSINOPHIL NFR BLD AUTO: 2.6 %
ERYTHROCYTE [DISTWIDTH] IN BLOOD BY AUTOMATED COUNT: 11.2 % (ref 11–15)
GLOBULIN PLAS-MCNC: 2.9 G/DL (ref 2–3.5)
GLUCOSE BLD-MCNC: 503 MG/DL (ref 70–99)
GLUCOSE BLDC GLUCOMTR-MCNC: 317 MG/DL (ref 70–99)
GLUCOSE BLDC GLUCOMTR-MCNC: 407 MG/DL (ref 70–99)
GLUCOSE UR-MCNC: >1000 MG/DL
HCT VFR BLD AUTO: 44 %
HGB BLD-MCNC: 15.5 G/DL
HGB UR QL STRIP.AUTO: NEGATIVE
IMM GRANULOCYTES # BLD AUTO: 0.02 X10(3) UL (ref 0–1)
IMM GRANULOCYTES NFR BLD: 0.3 %
KETONES UR-MCNC: NEGATIVE MG/DL
LEUKOCYTE ESTERASE UR QL STRIP.AUTO: NEGATIVE
LIPASE SERPL-CCNC: 272 U/L (ref 12–53)
LYMPHOCYTES # BLD AUTO: 2.31 X10(3) UL (ref 1–4)
LYMPHOCYTES NFR BLD AUTO: 31.9 %
MCH RBC QN AUTO: 30.2 PG (ref 26–34)
MCHC RBC AUTO-ENTMCNC: 35.2 G/DL (ref 31–37)
MCV RBC AUTO: 85.6 FL
MONOCYTES # BLD AUTO: 0.71 X10(3) UL (ref 0.1–1)
MONOCYTES NFR BLD AUTO: 9.8 %
NEUTROPHILS # BLD AUTO: 3.96 X10 (3) UL (ref 1.5–7.7)
NEUTROPHILS # BLD AUTO: 3.96 X10(3) UL (ref 1.5–7.7)
NEUTROPHILS NFR BLD AUTO: 54.7 %
NITRITE UR QL STRIP.AUTO: NEGATIVE
OSMOLALITY SERPL CALC.SUM OF ELEC: 290 MOSM/KG (ref 275–295)
PH UR: 5 [PH] (ref 5–8)
PLATELET # BLD AUTO: 247 10(3)UL (ref 150–450)
POTASSIUM SERPL-SCNC: 4.8 MMOL/L (ref 3.5–5.1)
PROT SERPL-MCNC: 7.5 G/DL (ref 5.7–8.2)
PROT UR-MCNC: NEGATIVE MG/DL
RBC # BLD AUTO: 5.14 X10(6)UL
SODIUM SERPL-SCNC: 129 MMOL/L (ref 136–145)
SP GR UR STRIP: >1.03 (ref 1–1.03)
UROBILINOGEN UR STRIP-ACNC: NORMAL
WBC # BLD AUTO: 7.2 X10(3) UL (ref 4–11)

## 2025-02-09 PROCEDURE — 74177 CT ABD & PELVIS W/CONTRAST: CPT | Performed by: STUDENT IN AN ORGANIZED HEALTH CARE EDUCATION/TRAINING PROGRAM

## 2025-02-09 RX ORDER — ONDANSETRON 2 MG/ML
4 INJECTION INTRAMUSCULAR; INTRAVENOUS ONCE
Status: COMPLETED | OUTPATIENT
Start: 2025-02-09 | End: 2025-02-09

## 2025-02-09 RX ORDER — INSULIN ASPART 100 [IU]/ML
15 INJECTION, SOLUTION INTRAVENOUS; SUBCUTANEOUS ONCE
Status: COMPLETED | OUTPATIENT
Start: 2025-02-09 | End: 2025-02-09

## 2025-02-09 RX ORDER — MORPHINE SULFATE 4 MG/ML
4 INJECTION, SOLUTION INTRAMUSCULAR; INTRAVENOUS ONCE
Status: COMPLETED | OUTPATIENT
Start: 2025-02-09 | End: 2025-02-09

## 2025-02-10 LAB
ANION GAP SERPL CALC-SCNC: 5 MMOL/L (ref 0–18)
BASOPHILS # BLD AUTO: 0.05 X10(3) UL (ref 0–0.2)
BASOPHILS NFR BLD AUTO: 0.7 %
BUN BLD-MCNC: 8 MG/DL (ref 9–23)
BUN/CREAT SERPL: 8 (ref 10–20)
CALCIUM BLD-MCNC: 9.1 MG/DL (ref 8.7–10.4)
CHLORIDE SERPL-SCNC: 102 MMOL/L (ref 98–112)
CO2 SERPL-SCNC: 31 MMOL/L (ref 21–32)
CREAT BLD-MCNC: 1 MG/DL
DEPRECATED RDW RBC AUTO: 34.5 FL (ref 35.1–46.3)
EGFRCR SERPLBLD CKD-EPI 2021: 91 ML/MIN/1.73M2 (ref 60–?)
EOSINOPHIL # BLD AUTO: 0.18 X10(3) UL (ref 0–0.7)
EOSINOPHIL NFR BLD AUTO: 2.4 %
ERYTHROCYTE [DISTWIDTH] IN BLOOD BY AUTOMATED COUNT: 11.1 % (ref 11–15)
GLUCOSE BLD-MCNC: 101 MG/DL (ref 70–99)
GLUCOSE BLDC GLUCOMTR-MCNC: 101 MG/DL (ref 70–99)
GLUCOSE BLDC GLUCOMTR-MCNC: 177 MG/DL (ref 70–99)
GLUCOSE BLDC GLUCOMTR-MCNC: 178 MG/DL (ref 70–99)
GLUCOSE BLDC GLUCOMTR-MCNC: 228 MG/DL (ref 70–99)
GLUCOSE BLDC GLUCOMTR-MCNC: 290 MG/DL (ref 70–99)
HCT VFR BLD AUTO: 40.8 %
HGB BLD-MCNC: 14.4 G/DL
IMM GRANULOCYTES # BLD AUTO: 0.02 X10(3) UL (ref 0–1)
IMM GRANULOCYTES NFR BLD: 0.3 %
LYMPHOCYTES # BLD AUTO: 2 X10(3) UL (ref 1–4)
LYMPHOCYTES NFR BLD AUTO: 26.9 %
MCH RBC QN AUTO: 30.2 PG (ref 26–34)
MCHC RBC AUTO-ENTMCNC: 35.3 G/DL (ref 31–37)
MCV RBC AUTO: 85.5 FL
MONOCYTES # BLD AUTO: 0.79 X10(3) UL (ref 0.1–1)
MONOCYTES NFR BLD AUTO: 10.6 %
NEUTROPHILS # BLD AUTO: 4.39 X10 (3) UL (ref 1.5–7.7)
NEUTROPHILS # BLD AUTO: 4.39 X10(3) UL (ref 1.5–7.7)
NEUTROPHILS NFR BLD AUTO: 59.1 %
OSMOLALITY SERPL CALC.SUM OF ELEC: 284 MOSM/KG (ref 275–295)
PLATELET # BLD AUTO: 230 10(3)UL (ref 150–450)
POTASSIUM SERPL-SCNC: 3.9 MMOL/L (ref 3.5–5.1)
RBC # BLD AUTO: 4.77 X10(6)UL
SODIUM SERPL-SCNC: 138 MMOL/L (ref 136–145)
TRIGL SERPL-MCNC: 194 MG/DL (ref 30–149)
WBC # BLD AUTO: 7.4 X10(3) UL (ref 4–11)

## 2025-02-10 PROCEDURE — 99223 1ST HOSP IP/OBS HIGH 75: CPT | Performed by: STUDENT IN AN ORGANIZED HEALTH CARE EDUCATION/TRAINING PROGRAM

## 2025-02-10 PROCEDURE — 99223 1ST HOSP IP/OBS HIGH 75: CPT | Performed by: INTERNAL MEDICINE

## 2025-02-10 PROCEDURE — 99233 SBSQ HOSP IP/OBS HIGH 50: CPT | Performed by: HOSPITALIST

## 2025-02-10 RX ORDER — DEXTROSE MONOHYDRATE 25 G/50ML
50 INJECTION, SOLUTION INTRAVENOUS
Status: DISCONTINUED | OUTPATIENT
Start: 2025-02-10 | End: 2025-02-11

## 2025-02-10 RX ORDER — ACETAMINOPHEN 500 MG
500 TABLET ORAL EVERY 4 HOURS PRN
Status: DISCONTINUED | OUTPATIENT
Start: 2025-02-10 | End: 2025-02-11

## 2025-02-10 RX ORDER — MORPHINE SULFATE 2 MG/ML
1 INJECTION, SOLUTION INTRAMUSCULAR; INTRAVENOUS EVERY 2 HOUR PRN
Status: DISCONTINUED | OUTPATIENT
Start: 2025-02-10 | End: 2025-02-11

## 2025-02-10 RX ORDER — PROCHLORPERAZINE EDISYLATE 5 MG/ML
5 INJECTION INTRAMUSCULAR; INTRAVENOUS EVERY 8 HOURS PRN
Status: DISCONTINUED | OUTPATIENT
Start: 2025-02-10 | End: 2025-02-11

## 2025-02-10 RX ORDER — POLYETHYLENE GLYCOL 3350 17 G/17G
17 POWDER, FOR SOLUTION ORAL DAILY PRN
Status: DISCONTINUED | OUTPATIENT
Start: 2025-02-10 | End: 2025-02-11

## 2025-02-10 RX ORDER — MORPHINE SULFATE 4 MG/ML
4 INJECTION, SOLUTION INTRAMUSCULAR; INTRAVENOUS EVERY 2 HOUR PRN
Status: DISCONTINUED | OUTPATIENT
Start: 2025-02-10 | End: 2025-02-11

## 2025-02-10 RX ORDER — ONDANSETRON 2 MG/ML
4 INJECTION INTRAMUSCULAR; INTRAVENOUS EVERY 6 HOURS PRN
Status: DISCONTINUED | OUTPATIENT
Start: 2025-02-10 | End: 2025-02-11

## 2025-02-10 RX ORDER — SODIUM PHOSPHATE, DIBASIC AND SODIUM PHOSPHATE, MONOBASIC 7; 19 G/230ML; G/230ML
1 ENEMA RECTAL ONCE AS NEEDED
Status: DISCONTINUED | OUTPATIENT
Start: 2025-02-10 | End: 2025-02-11

## 2025-02-10 RX ORDER — NICOTINE POLACRILEX 4 MG
30 LOZENGE BUCCAL
Status: DISCONTINUED | OUTPATIENT
Start: 2025-02-10 | End: 2025-02-11

## 2025-02-10 RX ORDER — FOLIC ACID 1 MG/1
1 TABLET ORAL DAILY
Status: DISCONTINUED | OUTPATIENT
Start: 2025-02-10 | End: 2025-02-11

## 2025-02-10 RX ORDER — ENOXAPARIN SODIUM 100 MG/ML
40 INJECTION SUBCUTANEOUS DAILY
Status: DISCONTINUED | OUTPATIENT
Start: 2025-02-10 | End: 2025-02-11

## 2025-02-10 RX ORDER — SENNOSIDES 8.6 MG
17.2 TABLET ORAL NIGHTLY PRN
Status: DISCONTINUED | OUTPATIENT
Start: 2025-02-10 | End: 2025-02-11

## 2025-02-10 RX ORDER — NICOTINE POLACRILEX 4 MG
15 LOZENGE BUCCAL
Status: DISCONTINUED | OUTPATIENT
Start: 2025-02-10 | End: 2025-02-11

## 2025-02-10 RX ORDER — SODIUM CHLORIDE, SODIUM LACTATE, POTASSIUM CHLORIDE, CALCIUM CHLORIDE 600; 310; 30; 20 MG/100ML; MG/100ML; MG/100ML; MG/100ML
INJECTION, SOLUTION INTRAVENOUS CONTINUOUS
Status: DISCONTINUED | OUTPATIENT
Start: 2025-02-10 | End: 2025-02-11

## 2025-02-10 RX ORDER — BISACODYL 10 MG
10 SUPPOSITORY, RECTAL RECTAL
Status: DISCONTINUED | OUTPATIENT
Start: 2025-02-10 | End: 2025-02-11

## 2025-02-10 RX ORDER — INSULIN DEGLUDEC 100 U/ML
10 INJECTION, SOLUTION SUBCUTANEOUS DAILY
Status: DISCONTINUED | OUTPATIENT
Start: 2025-02-10 | End: 2025-02-11

## 2025-02-10 RX ORDER — MORPHINE SULFATE 2 MG/ML
2 INJECTION, SOLUTION INTRAMUSCULAR; INTRAVENOUS EVERY 2 HOUR PRN
Status: DISCONTINUED | OUTPATIENT
Start: 2025-02-10 | End: 2025-02-11

## 2025-02-10 NOTE — H&P
[facility]  part of [facility]    History & Physical    Patel Lechuag Patient Status:  Emergency    9/15/1970 MRN [id number]   Location [facility] EMERGENCY DEPARTMENT Attending Courtney Hale MD   Hosp Day # 0 PCP Aram Ann MD     Date:  2/10/2025  Date of Admission:  2025    Chief Complaint:  Chief Complaint   Patient presents with    Abdomen/Flank Pain       Assessment and Plan:    History of recurrent pancreatitis 2/2 alcohol/ethanol use  Epigastric pain  Acute pancreatitis  -Elevated lipase 272 with imaging revealing acute pancreatitis with pancreatic head edema but no fluid collections or evidence of necrosis.  Patient denies recent alcohol use or tobacco use.  No clear etiology of his recurrent pancreatitis.  -Check triglyceride level  -Maintain n.p.o.  -Aggressive IV fluid hydration  -Pain control with morphine as needed  -Gastroenterology on consult, appreciate recs    Uncontrolled DM type II  Hyperglycemia  -Blood glucose on presentation 503, no DKA, normal anion gap.  Patient treated with 15 units NovoLog with improvement of his blood glucose to 317.  -Hold home dose of metformin/glipizide  -Initiate scheduled high-dose insulin sliding scale every 6 hours and closely monitor blood glucose.  Hypoglycemia protocol.  -Most recent hemoglobin A1c 12.4 2025    Pseudohyponatremia  -Sodium level 129 but when corrected for blood glucose 503, it is actually 135.    History of hepatic steatosis  -Patient will follow-up as an outpatient with hepatologist for further evaluation of his hepatic steatosis    GERD  Eczema    Prophylaxis  Subcutaneous heparin    CODE STATUS  Full  History of Present Illness:  Patel Lechuga is a(n) 54 year old male, who presents for evaluation of epigastric pain. PMHx significant for recurrent pancreatitis, NIDDM type II, hyperlipidemia, hepatic steatosis, GERD, eczema.  Patient developed epigastric pain radiating to the back since Wednesday,  progressively worsened.  Initially the pain was constant associated with nausea but no vomiting.  He rates the pain 12 out of 10.  He reports similar pain in the past when he had pancreatitis.  Denies any alcohol use or tobacco use.  In the past his pancreatitis was thought to be secondary to alcohol use/tobacco use.  No melena or hematochezia.  Of note, patient saw his GI doctor on Wednesday 2/5/25 and during his office visit there was no concern for epigastric pain/abdominal pain.  He was actually referred to hepatologist for further evaluation of his hepatic steatosis.  He has not made an appointment yet.  Patient denies chest pain or shortness of breath.  No fever or chills.  He denies any other symptoms.  On presentation to the ED, initial vital signs remarkable for elevated blood pressure 149/91.  Lab work significant for blood glucose 503 with sodium 129, no DKA, normal anion gap, lipase elevated 272.  CT abdomen pelvis reveals acute pancreatitis with pancreatic head edema but no fluid collections or evidence of necrosis.  Patient was treated with insulin 15 units which brought his blood glucose to 317.  He was also treated with morphine 4 mg x 3, Zofran, 1 L IV fluid.  He was admitted under hospitalist service with consultation to gastroenterology.    History:  Past Medical History:    Depression    Diabetes (HCC)    Eczema    Hyperlipidemia    Liver disease    Pancreatitis (HCC)    Psoriasis     Past Surgical History:   Procedure Laterality Date    Colonoscopy screening - referral N/A 07/25/2022    Procedure: COLONOSCOPY-SCREENING/ ESOPHAGOGASTRODUODENOSCOPY (EGD)/ENDOSCOPIC ULTRASOUND with popssible Fine needle aspiration;  Surgeon: Eliezer Roth MD;  Location: [facility] ENDOSCOPY    Hernia surgery       Family History   Problem Relation Age of Onset    Diabetes Mother     Cancer Sister         breast cancer    No Known Problems Brother     No Known Problems Brother     No Known Problems Brother        reports that he has quit smoking. His smoking use included cigarettes. He has been exposed to tobacco smoke. He has never used smokeless tobacco. He reports that he does not currently use alcohol. He reports that he does not currently use drugs after having used the following drugs: \"Crack\" cocaine.    Allergies:  Allergies[1]    Home Medications:  Prior to Admission Medications   Prescriptions Last Dose Informant Patient Reported? Taking?   Omeprazole 40 MG Oral Capsule Delayed Release   No No   Sig: Take 1 capsule (40 mg total) by mouth daily.   PEG 3350-KCl-Na Bicarb-NaCl (TRILYTE) 420 g Oral Recon Soln   No No   Sig: Take prep as directed by gastro office. May substitute with Trilyte/generic equivalent if needed.   Risankizumab-rzaa (SKYRIZI PEN) 150 MG/ML Subcutaneous Solution Auto-injector   No No   Sig: Inject 150 mg into the skin every 3 (three) months.   Sildenafil Citrate 100 MG Oral Tab   No No   Sig: Take 1 tablet (100 mg total) by mouth daily as needed for Erectile Dysfunction.   clobetasol 0.05 % External Ointment   No No   Sig: Apply twice daily  Monday-Friday. Take weekends off. Use on hands and elbows.   folic acid 1 MG Oral Tab   No No   Sig: Take 1 tablet (1 mg total) by mouth daily.   glipiZIDE ER 10 MG Oral Tablet 24 Hr   No No   Sig: Take 1 tablet (10 mg total) by mouth daily with breakfast.   metFORMIN HCl 1000 MG Oral Tab   No No   Sig: Take 1 tablet (1,000 mg total) by mouth 2 (two) times daily with meals.      Facility-Administered Medications: None       Review of Systems:  Constitutional: Negative  Eye:  Negative.  Ear/Nose/Mouth/Throat:  Negative.  Respiratory:  Negative  Cardiovascular: Negative  Gastrointestinal:  + Epigastric pain radiating to the back  Genitourinary:  Negative  Endocrine:  Negative.  Immunologic:  Negative.  Musculoskeletal:  Negative.  Integumentary:  Negative.  Neurologic:  Negative.  Psychiatric:  Negative.  ROS reviewed as documented in chart    Physical  Exam:  Temp:  [98.1 °F (36.7 °C)] 98.1 °F (36.7 °C)  Pulse:  [72-83] 73  Resp:  [16-18] 16  BP: (126-149)/(85-91) 133/88  SpO2:  [97 %-99 %] 98 %    General:  Alert and oriented.  Diffuse skin problem:  None.  Eye:  Pupils are equal, round and reactive to light, extraocular movements are intact, Normal conjunctiva.  HENT:  Normocephalic, oral mucosa is moist.  Head:  Normocephalic, atraumatic.  Neck:  Supple, non-tender, no carotid bruit, no jugular venous distention, no lymphadenopathy, no thyromegaly.  Respiratory:  Lungs are clear to auscultation, respirations are non-labored, breath sounds are equal, symmetrical chest wall expansion.  Cardiovascular:  Normal rate, regular rhythm, no murmur, no edema.  Gastrointestinal:  Soft, tender in the epigastric region, non-distended, normal bowel sounds, no organomegaly.  Lymphatics:  No lymphadenopathy neck, axilla, groin.  Musculoskeletal: Normal range of motion.  normal strength.  Feet:  Normal pulses.  Neurologic:  Alert, oriented, no focal deficits, cranial nerves II-XII are grossly intact.  Cognition and Speech:  Oriented, speech clear and coherent.  Psychiatric:  Cooperative, appropriate mood & affect.      Laboratory Data:   Lab Results   Component Value Date    WBC 7.2 02/09/2025    HGB 15.5 02/09/2025    HCT 44.0 02/09/2025    .0 02/09/2025    CREATSERUM 1.24 02/09/2025    BUN 11 02/09/2025     02/09/2025    K 4.8 02/09/2025    CL 96 02/09/2025    CO2 26.0 02/09/2025     02/09/2025    CA 9.5 02/09/2025    ALB 4.6 02/09/2025    ALKPHO 116 02/09/2025    BILT 0.6 02/09/2025    TP 7.5 02/09/2025    AST 30 02/09/2025    ALT 41 02/09/2025     02/09/2025       Imaging:  No results found.     Primary care physician  Aram Ann MD    60 minutes spent on this admission - examining patient, obtaining history, reviewing previous medical records, going over test results/imaging and discussing plan of care. All questions answered.      Disposition  Clinical course will dictate outcome      Anat Deluca MD  2/10/2025  12:02 AM            [1]   Allergies  Allergen Reactions    Cat Hair Extract ITCHING    Dander ITCHING      Cat dander: Watery eyes, sneezing

## 2025-02-10 NOTE — CONSULTS
Is this a shared or split note between Advanced Practice Provider and Physician? Yes      [facility]   Gastroenterology Consultation Note    Patel Lechuga  Patient Status:    Inpatient  Date of Admission:         2/9/2025, Hospital day #0  Attending:   Manjit Flores MD  PCP:     Aram Ann MD    Reason for Consultation:  Pancreatitis    History of Present Illness:  Patel Lechuga is a 54 year old male w/ PMHx of 28.31, Pancreatitis, Depression, DM, Eczema, HLD, Psoriasis who presents to the ED with worsening epigastric ABD pain.    Pt states that he had been doing well, and recently saw an NP in GI clinic.  Since last Wednesday he has had smoldering epigastric ABD pain that has progressively worsened.  He started to have chills and nausea on Sunday after indulging in some high fat trigger foods, such as cheese.  His last ETOH beverage with 12/31, he had 12 beers.  He has not been smoking.  No trauma to the ABD.  He denies dyspepsia, constipation/diarrhea, black/bloody stools, unintentional weight loss, chest pain and SOB.  He does not take NSAIDs regularly.    Pertinent Family Hx:  - No known history of esophageal, gastric or colon cancers  - No known IBD  - No known liver pathologies    Endoscopy Hx:  - EGD/EUS 7/2022 with Dr. Roth for dyspepsia and hx of pancreatitis:   Impression:  1. Mild non-specific gastric erythema  2. Non-specific mild changes in the pancreas    -Colonoscopy 7/2022 with Dr. Roth for CRC screening:   Impression:  1. Two colon polyps removed  2. Small non-bleeding hemorrhoids  3. Otherwise, unremarkable colonoscopy    Final Diagnosis:      A. Gastric biopsy:  Gastric antral and oxyntic mucosa with chronic inactive gastritis.  Negative for intestinal metaplasia or dysplasia.  Diff-Quik stain (with appropriate control reactivity), is negative for H. pylori microorganisms.     B. Transverse colon polyps x2:  Tubular adenoma fragments x6.  Hyperplastic polyp fragments  x3.     Social Hx:  - No current tobacco use  - No current ETOH, last drink 12/31/2024  - Denies cannabis/illicit drug use  - Denies NSAIDs  - Lives alone  - Occupation: [employer]      History:  Past Medical History:    Depression    Diabetes (HCC)    Eczema    Hyperlipidemia    Liver disease    Pancreatitis (HCC)    Psoriasis     Past Surgical History:   Procedure Laterality Date    Colonoscopy screening - referral N/A 07/25/2022    Procedure: COLONOSCOPY-SCREENING/ ESOPHAGOGASTRODUODENOSCOPY (EGD)/ENDOSCOPIC ULTRASOUND with popssible Fine needle aspiration;  Surgeon: Eliezer Roth MD;  Location: [facility] ENDOSCOPY    Hernia surgery       Family History   Problem Relation Age of Onset    Diabetes Mother     Cancer Sister         breast cancer    No Known Problems Brother     No Known Problems Brother     No Known Problems Brother       reports that he has quit smoking. His smoking use included cigarettes. He started smoking 6 days ago. He has been exposed to tobacco smoke. He has never used smokeless tobacco. He reports that he does not currently use alcohol. He reports that he does not currently use drugs after having used the following drugs: \"Crack\" cocaine.    Allergies:  Allergies[1]    Medications:    Current Facility-Administered Medications:     lactated ringers infusion, , Intravenous, Continuous    enoxaparin (Lovenox) 40 MG/0.4ML SUBQ injection 40 mg, 40 mg, Subcutaneous, Daily    acetaminophen (Tylenol Extra Strength) tab 500 mg, 500 mg, Oral, Q4H PRN    morphINE PF 2 MG/ML injection 1 mg, 1 mg, Intravenous, Q2H PRN **OR** morphINE PF 2 MG/ML injection 2 mg, 2 mg, Intravenous, Q2H PRN **OR** morphINE PF 4 MG/ML injection 4 mg, 4 mg, Intravenous, Q2H PRN    polyethylene glycol (PEG 3350) (Miralax) 17 g oral packet 17 g, 17 g, Oral, Daily PRN    sennosides (Senokot) tab 17.2 mg, 17.2 mg, Oral, Nightly PRN    bisacodyl (Dulcolax) 10 MG rectal suppository 10 mg, 10 mg, Rectal, Daily PRN    fleet enema  (Fleet) rectal enema 133 mL, 1 enema, Rectal, Once PRN    ondansetron (Zofran) 4 MG/2ML injection 4 mg, 4 mg, Intravenous, Q6H PRN    prochlorperazine (Compazine) 10 MG/2ML injection 5 mg, 5 mg, Intravenous, Q8H PRN    pantoprazole (Protonix) 40 mg in sodium chloride 0.9% PF 10 mL IV push, 40 mg, Intravenous, Daily    folic acid (Folvite) tab 1 mg, 1 mg, Oral, Daily    glucose (Dex4) 15 GM/59ML oral liquid 15 g, 15 g, Oral, Q15 Min PRN **OR** glucose (Glutose) 40% oral gel 15 g, 15 g, Oral, Q15 Min PRN **OR** glucose-vitamin C (Dex-4) chewable tab 4 tablet, 4 tablet, Oral, Q15 Min PRN **OR** dextrose 50% injection 50 mL, 50 mL, Intravenous, Q15 Min PRN **OR** glucose (Dex4) 15 GM/59ML oral liquid 30 g, 30 g, Oral, Q15 Min PRN **OR** glucose (Glutose) 40% oral gel 30 g, 30 g, Oral, Q15 Min PRN **OR** glucose-vitamin C (Dex-4) chewable tab 8 tablet, 8 tablet, Oral, Q15 Min PRN    insulin regular human (Novolin R, Humulin R) 100 UNIT/ML injection 1-11 Units, 1-11 Units, Subcutaneous, Q6H    Review of Systems:   CONSTITUTIONAL:  negative for fevers, + chills, unintentional weight loss   EYES:  negative for diplopia or change in vision   RESPIRATORY:  negative for severe shortness of breath  CARDIOVASCULAR:  negative for crushing sub-sternal chest pain  GASTROINTESTINAL:  see HPI  GENITOURINARY:  negative for dysuria or gross hematuria  INTEGUMENT/BREAST:  SKIN:  negative for jaundice or new rash   ALLERGIC/IMMUNOLOGIC:  negative for hay fever   ENDOCRINE:  negative for cold intolerance and heat intolerance  MUSCULOSKELETAL:  negative for joint effusion/severe erythema  NEURO: negative for new loss of consciousness or dizziness   BEHAVIOR/PSYCH:  negative for psychotic behavior    Physical Exam:    Blood pressure 109/79, pulse 72, temperature 97.9 °F (36.6 °C), temperature source Oral, resp. rate 18, height 5' 10\" (1.778 m), weight 197 lb 4.8 oz (89.5 kg), SpO2 97%. Body mass index is 28.31 kg/m².    Gen: awake, alert  patient, NAD  HEENT: EOMI, the sclera appears anicteric, oropharynx clear, mucus membranes appear moist  CV: RRR  Lung: no conversational dyspnea   Abdomen: soft, mild TTP in mid epigastrium without rebound or guarding, ND abdomen with NABS appreciated   Back: No CVA tenderness   Skin: dry, warm, no jaundice  Ext: no LE edema is evident  Neuro: Alert, oriented x4 and interactive  Psych: calm, cooperative    Laboratory Data:  Lab Results   Component Value Date    WBC 7.4 02/10/2025    HGB 14.4 02/10/2025    HCT 40.8 02/10/2025    .0 02/10/2025    CREATSERUM 1.00 02/10/2025    BUN 8 02/10/2025     02/10/2025    K 3.9 02/10/2025     02/10/2025    CO2 31.0 02/10/2025     02/10/2025    CA 9.1 02/10/2025    ALB 4.6 02/09/2025    ALKPHO 116 02/09/2025    BILT 0.6 02/09/2025    TP 7.5 02/09/2025    AST 30 02/09/2025    ALT 41 02/09/2025     02/09/2025       Imaging:  CT ABDOMEN+PELVIS(CONTRAST ONLY)(CPT=74177)    Result Date: 2/10/2025  CONCLUSION:  1. Acute interstitial edematous pancreatitis, which predominantly involves the pancreatic head and uncinate process.  There is mild inflammation at the pancreaticoduodenal groove.  No well-defined/drainable peripancreatic collection.  No CT findings of pancreatic necrosis.  No significant pancreatic or biliary ductal dilation. 2. Probable mild fatty liver. 3. Small calcification adjacent to the mid left ureter is unchanged over serial prior exams and therefore favored to represent an incidental phlebolith (or less likely a chronic nonobstructing mid ureteral calculus). 4. Circumferential urinary bladder wall thickening, which may relate to incomplete distention or cystitis.  If there are referable symptoms, urinalysis correlation is requested. 5. Lesser incidental findings as above.   A preliminary report was issued by the [facility] teleradiology service. There are no major discrepancies.  elm-remote  Dictated by (CST): Lui Funes MD  on 2/10/2025 at 9:08 AM     Finalized by (CST): Lui Funes MD on 2/10/2025 at 9:14 AM           Assessment & Plan   Patel Lechuga is a 54 year old male w/ PMHx of 28.31, Pancreatitis, Depression, DM, Eczema, HLD, Psoriasis who presents to the ED with worsening epigastric ABD pain.    #Pancreatitis  -Labs on admission with glucose 503, , lipase 272, LFTs WNL, no leukocytosis or anemia  -CT A/P with acute interstitial edematous pancreatitis, mild inflammation at the pancreaticoduodenal groove, no well-defined/drainable peripancreatic collection or necrosis, no biliary ductal dilation  -Etiology remains unclear, previous occurrences thought related to ETOH/smoking but Pt states last drink was 12/31/24.  He no longer smokes.  IGG4 WNL in 2020 and 2021.  TG elevated but not enough to typically cause acute pancreatitis.  No trauma.  LFTs WNL and no cholelithiasis on recent liver US elastography.  -Pt is feeling somewhat improved today with IVF.  He would like to trial CLD and progress as able throughout the day.  Counseled on ETOH/smoking cessation/abstinence, good diabetes control, and low fat/high protein diet.  Pt states understanding.    Recommend:  -LR at 150 ml/hr  -CLD, if tolerates may progress to low fat/high protein  -Antiemetics and pain regimen per primary  -ETOH/smoking abstinence    Thank you for the opportunity to participate in the care of this patient.    Case discussed with Mabel Pham MD and Francesca SEQUEIRA.    Tasha Alonso DNP, FNP-[facility]astroenter[facility]  2/10/2025        [1]   Allergies  Allergen Reactions    Cat Hair Extract ITCHING    Dander ITCHING      Cat dander: Watery eyes, sneezing

## 2025-02-10 NOTE — ED PROVIDER NOTES
[place] Emergency Department Note  Patient: Patel Lechuga Age: 54 year old Sex: male      MRN: [id number]  : 9/15/1970    Patient Seen in: [facility] Emergency Department    History     Chief Complaint   Patient presents with    Abdomen/Flank Pain     Stated Complaint: Pancreatitis    History obtained from: Patient    54-year-old male with a past medical history of diabetes, hyperlipidemia, recurrent pancreatitis believed to be secondary to alcohol and tobacco use, psoriasis, depression, eczema presenting for evaluation of 5 days of epigastric abdominal pain.  He states he has been having constant pain in his epigastrium that radiates to his back associated nausea but no vomiting.  No fevers.  No diarrhea.  States that symptoms are identical to his prior episodes of pancreatitis.    Review of Systems:  Review of Systems  Positive for stated complaint: Pancreatitis. Constitutional and vital signs reviewed. All other systems reviewed and negative except as noted above.    Patient History:  Past Medical History:    Depression    Diabetes (HCC)    Eczema    Hyperlipidemia    Liver disease    Pancreatitis (HCC)    Psoriasis       Past Surgical History:   Procedure Laterality Date    Colonoscopy screening - referral N/A 2022    Procedure: COLONOSCOPY-SCREENING/ ESOPHAGOGASTRODUODENOSCOPY (EGD)/ENDOSCOPIC ULTRASOUND with popssible Fine needle aspiration;  Surgeon: Eliezer Roth MD;  Location: [facility] ENDOSCOPY    Hernia surgery          Family History   Problem Relation Age of Onset    Diabetes Mother     Cancer Sister         breast cancer    No Known Problems Brother     No Known Problems Brother     No Known Problems Brother        Specific Social Determinants of Health:   Social History     Socioeconomic History    Marital status: Single   Tobacco Use    Smoking status: Former     Current packs/day: 0.00     Types: Cigarettes     Passive exposure: Current    Smokeless tobacco: Never    Tobacco  comments:     3-4 a day    Vaping Use    Vaping status: Every Day    Substances: Nicotine    Devices: Disposable   Substance and Sexual Activity    Alcohol use: Not Currently     Comment: drank 12 beers Sunday night (7-). He also reported having 5 beers last night and also used cocaine. He was previously sober for 22 months.    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 relapsed sunday used crack cocaine on 7-18-24  ( $50.00 of cocaine)   Other Topics Concern    Grew up on a farm No    History of tanning No    Outdoor occupation No    Reaction to local anesthetic No    Caffeine Concern Yes    Exercise No    Pt has a pacemaker No    Pt has a defibrillator No   Social History Narrative    The patient does not use an assistive device..      The patient does live in a home with stairs.     Social Drivers of Health     Food Insecurity: No Food Insecurity (5/26/2024)    Food Insecurity     Food Insecurity: Never true   Transportation Needs: No Transportation Needs (5/26/2024)    Transportation Needs     Lack of Transportation: No   Housing Stability: Low Risk  (5/26/2024)    Housing Stability     Housing Instability: No           PSFH elements reviewed from today and agreed except as otherwise stated in HPI.    Physical Exam     ED Triage Vitals [02/09/25 2027]   BP (!) 149/91   Pulse 83   Resp 18   Temp 98.1 °F (36.7 °C)   Temp src Oral   SpO2 98 %   O2 Device None (Room air)       Current:/88   Pulse 73   Temp 98.1 °F (36.7 °C) (Oral)   Resp 16   Ht 177.8 cm (5' 10\")   Wt 93 kg   SpO2 98%   BMI 29.41 kg/m²         Physical Exam  Constitutional:       Appearance: He is well-developed.   HENT:      Head: Normocephalic and atraumatic.      Right Ear: External ear normal.      Left Ear: External ear normal.      Nose: Nose normal.   Eyes:      Conjunctiva/sclera: Conjunctivae normal.      Pupils: Pupils are equal, round, and reactive to light.    Cardiovascular:      Rate and Rhythm: Normal rate and regular rhythm.      Heart sounds: Normal heart sounds.   Pulmonary:      Effort: Pulmonary effort is normal.      Breath sounds: Normal breath sounds.   Abdominal:      General: Bowel sounds are normal.      Palpations: Abdomen is soft.      Tenderness: There is abdominal tenderness in the epigastric area.   Musculoskeletal:         General: Normal range of motion.      Cervical back: Normal range of motion and neck supple.   Skin:     General: Skin is warm and dry.      Findings: No rash.   Neurological:      General: No focal deficit present.      Mental Status: He is alert and oriented to person, place, and time.      Deep Tendon Reflexes: Reflexes are normal and symmetric.   Psychiatric:         Mood and Affect: Mood normal.         Behavior: Behavior normal.         ED Course   Labs:   Labs Reviewed   COMP METABOLIC PANEL (14) - Abnormal; Notable for the following components:       Result Value    Glucose 503 (*)     Sodium 129 (*)     Chloride 96 (*)     BUN/CREA Ratio 8.9 (*)     All other components within normal limits   CBC WITH DIFFERENTIAL WITH PLATELET - Abnormal; Notable for the following components:    RDW-SD 34.6 (*)     All other components within normal limits   LIPASE - Abnormal; Notable for the following components:    Lipase 272 (*)     All other components within normal limits   URINALYSIS WITH CULTURE REFLEX - Abnormal; Notable for the following components:    Spec Gravity >1.030 (*)     Glucose Urine >1000 (*)     All other components within normal limits   POCT GLUCOSE - Abnormal; Notable for the following components:    POC Glucose  407 (*)     All other components within normal limits   POCT GLUCOSE - Abnormal; Notable for the following components:    POC Glucose  317 (*)     All other components within normal limits     Radiology findings:  I personally reviewed the images.   No results found.    CT ABDOMEN PELVIS WITH CONTRAST      COMPARISON: 10/3/2020     IMPRESSION:    Acute pancreatitis with pancreatic head edema and mild adjacent fat stranding.  No loculated fluid collections or evidence of necrosis.    Hepatic steatosis.  Normal gallbladder.  No biliary dilation.    Chronic incidental finding of a 4 mm calcification within or closely approximating the mid to distal left ureter (2/94).  No hydronephrosis.  No change from 2020.    Moderate colonic stool.    Additional nonacute: Normal appendix.  No diverticulitis.  No bowel obstruction.  No urinary obstruction.  Normal aorta.  Lung bases clear.      Cardiac Monitor: Interpreted by me.   Pulse Readings from Last 1 Encounters:   02/09/25 73   , sinus,     External non-ED records reviewed independently by me: GI note from 2/5/2025 reviewed confirming patient has a past medical history of recurrent pancreatitis secondary to tobacco and alcohol use with endoscopic ultrasound from 7/2022 without concerning finding.  Also with history of hepatic steatosis suspected related to alcohol use.    MDM   54-year-old male with a past medical history of diabetes, hyperlipidemia, recurrent pancreatitis, psoriasis, depression, eczema presenting for evaluation of 5 days of epigastric abdominal pain rating to the back associate with nausea.  On exam, he is tender in the epigastrium but otherwise hemodynamically stable.    Differential diagnoses considered includes, but is not limited to: Pancreatitis, GERD, gastritis, biliary obstructive process.    Will obtain the following tests: CBC, CMP, lipase, urinalysis, CT abdomen pelvis with IV contrast  Please see ED course for my independent review of these tests/imaging results.    Initial Medications/Therapeutics administered: Morphine, Zofran, NS bolus, subcutaneous insulin    Chronic conditions affecting care: Diabetes, hyperlipidemia, recurrent pancreatitis, psoriasis, depression, eczema    Workup and medications considered but not ordered: None    Social  Determinants of Health that impacted care: None     ED course: Laboratory workup with lipase elevated to 272.  I independently reviewed the CT abdomen pelvis images that show evidence of acute pancreatitis.  Agree with radiology read above.  Overall concerning for acute recurrence of his pancreatitis.  Also with hyperglycemia with glucose of 503.  No evidence of DKA.  He is receiving NS bolus.  Will also give subcutaneous insulin.  I endorsed patient's case to the [place] hospitalist, Dr. Deluca, who accepted the patient for admission.  Also endorsed to Dr. Silvestre GI, who is made aware of new consult.      Disposition and Plan     Clinical Impression:  1. Acute pancreatitis without infection or necrosis, unspecified pancreatitis type (HCC)        Disposition:  Admit    Follow-up:  No follow-up provider specified.    Medications Prescribed:  Current Discharge Medication List          Hospital Problems       Present on Admission  Date Reviewed: 2/5/2025            ICD-10-CM Noted POA    * (Principal) Acute pancreatitis without infection or necrosis, unspecified pancreatitis type (HCC) K85.90 2/9/2025 Unknown        This note may have been created using voice dictation technology and may include inadvertent errors.      Courtney Hale MD  Emergency Medicine

## 2025-02-10 NOTE — ED INITIAL ASSESSMENT (HPI)
Pt arrived to ED for abd pain onset 4 days ago. Pt states pain worsening today. Hx of pancreatitis and liver disease. Pt has not taken medication for the pain. Pt states he's unable to sit still due to the pain.  
167.64

## 2025-02-10 NOTE — ED QUICK NOTES
Assumed care of patient. Rounding Completed    Plan of Care reviewed. Waiting for CT to result.  Elimination needs assessed.  Provided RN introduction.    Bed is locked and in lowest position. Call light within reach.

## 2025-02-10 NOTE — PLAN OF CARE
Problem: Patient Centered Care  Goal: Patient preferences are identified and integrated in the patient's plan of care  Description: Interventions:  - What would you like us to know as we care for you? He has had a flare up like this in the past.   - Provide timely, complete, and accurate information to patient/family  - Incorporate patient and family knowledge, values, beliefs, and cultural backgrounds into the planning and delivery of care  - Encourage patient/family to participate in care and decision-making at the level they choose  - Honor patient and family perspectives and choices  Outcome: Progressing     Problem: Patient/Family Goals  Goal: Patient/Family Long Term Goal  Description: Patient's Long Term Goal: Be healthy    Interventions:  - Smoking, drug, and alcohol cessation education  - Dietary education  - Patient verbalizes importance of taking medications as prescribed  - Follow up with PCP outpatient  - See additional Care Plan goals for specific interventions  Outcome: Progressing  Goal: Patient/Family Short Term Goal  Description: Patient's Short Term Goal: No pain    Interventions:   - Pain medication prn  - NPO  - GI consult  - IVF  - See additional Care Plan goals for specific interventions  Outcome: Progressing     Problem: GASTROINTESTINAL - ADULT  Goal: Minimal or absence of nausea and vomiting  Description: INTERVENTIONS:  - Maintain adequate hydration with IV or PO as ordered and tolerated  - Nasogastric tube to low intermittent suction as ordered  - Evaluate effectiveness of ordered antiemetic medications  - Provide nonpharmacologic comfort measures as appropriate  - Advance diet as tolerated, if ordered  - Obtain nutritional consult as needed  - Evaluate fluid balance  Outcome: Progressing  Goal: Maintains or returns to baseline bowel function  Description: INTERVENTIONS:  - Assess bowel function  - Maintain adequate hydration with IV or PO as ordered and tolerated  - Evaluate  effectiveness of GI medications  - Encourage mobilization and activity  - Obtain nutritional consult as needed  - Establish a toileting routine/schedule  - Consider collaborating with pharmacy to review patient's medication profile  Outcome: Progressing     Problem: METABOLIC/FLUID AND ELECTROLYTES - ADULT  Goal: Glucose maintained within prescribed range  Description: INTERVENTIONS:  - Monitor Blood Glucose as ordered  - Assess for signs and symptoms of hyperglycemia and hypoglycemia  - Administer ordered medications to maintain glucose within target range  - Assess barriers to adequate nutritional intake and initiate nutrition consult as needed  - Instruct patient on self management of diabetes  Outcome: Progressing  Goal: Electrolytes maintained within normal limits  Description: INTERVENTIONS:  - Monitor labs and rhythm and assess patient for signs and symptoms of electrolyte imbalances  - Administer electrolyte replacement as ordered  - Monitor response to electrolyte replacements, including rhythm and repeat lab results as appropriate  - Fluid restriction as ordered  - Instruct patient on fluid and nutrition restrictions as appropriate  Outcome: Progressing  Goal: Hemodynamic stability and optimal renal function maintained  Description: INTERVENTIONS:  - Monitor labs and assess for signs and symptoms of volume excess or deficit  - Monitor intake, output and patient weight  - Monitor urine specific gravity, serum osmolarity and serum sodium as indicated or ordered  - Monitor response to interventions for patient's volume status, including labs, urine output, blood pressure (other measures as available)  - Encourage oral intake as appropriate  - Instruct patient on fluid and nutrition restrictions as appropriate  Outcome: Progressing

## 2025-02-10 NOTE — PROGRESS NOTES
[facility]  part of [facility]  Hospitalist Progress Note     Patel Lechuga Patient Status:  Inpatient    9/15/1970  54 year old CSN [id number]   Location 105/105-A Attending Manjit Flores MD   Hosp Day # 0 PCP Aram Ann MD     Assessment & Plan:   ----------------------------------  Acute pancreatitis.  Unknown cause. Lipase minimally up at the time of admission. CT and sxs c/w pancreatitis. TGs ok. Denies recent etoh.  -CLD  -Aggressive IV fluids  -Pain control  -Recheck lipase as clinically warranted  -Encouraged alcohol cessation  -GI consult pend     Hyperglycemia, hyperosmolar.  Known diabetes, latest A1c 12.3.  Inciting event(s): pancreatitis.  Blood sugars as high as 500.  No acidosis.  Symptoms are improved.  BS improved  -Frequent Accu-Cheks  -Discharge regimen TBD  -Tresiba 10  -Novolog SS    Other problems  Pseudohyponatremia  GERD  Hepatic steatosis    DVT Mechanical Prophylaxis:   SCDs,    DVT Pharmacologic Prophylaxis   Medication    enoxaparin (Lovenox) 40 MG/0.4ML SUBQ injection 40 mg              code status: full  dispo: home upon medical clearance  If applicable, malnutrition status at bottom of note    I personally reviewed the available laboratories, imaging including. I discussed/will discuss the case with consultants. I ordered laboratories and/or radiographic studies. I adjusted medications as detailed above.  Medical decision making high, risk is high.    Subjective:   ----------------------------------  Pain improved somewhat.  No nausea or vomiting.  Denies recent alcohol use.  Feels that he could tolerate clear liquids at this point.      Objective:   Chief Complaint:   Chief Complaint   Patient presents with    Abdomen/Flank Pain     ----------------------------------  Temp:  [97.9 °F (36.6 °C)-98.4 °F (36.9 °C)] 97.9 °F (36.6 °C)  Pulse:  [72-83] 72  Resp:  [16-19] 18  BP: (109-149)/(79-91) 109/79  SpO2:  [95 %-99 %] 97 %  Gen: A+Ox3.  No distress.   HEENT:  NCAT, neck supple, no carotid bruit.  CV: RRR, S1S2, and intact distal pulses. No gallop, rub, murmur.  Pulm: Effort and breath sounds normal. No distress, wheezes, rales, rhonchi.  Abd: Soft, mild tenderness to palpation of bilateral upper quadrants, no rebound or guarding  Neuro: Normal reflexes, CN. Sensory/motor exams grossly normal deficit.   MS: No joint effusions.  No peripheral edema.  Skin: Skin is warm and dry. No rashes, erythema, diaphoresis.   Psych: Normal mood and affect. Calm, cooperative    Labs:  Lab Results   Component Value Date    HGB 14.4 02/10/2025    WBC 7.4 02/10/2025    .0 02/10/2025     02/10/2025    K 3.9 02/10/2025    CREATSERUM 1.00 02/10/2025    AST 30 02/09/2025    ALT 41 02/09/2025    TROP <0.045 10/03/2020            enoxaparin  40 mg Subcutaneous Daily    pantoprazole  40 mg Intravenous Daily    folic acid  1 mg Oral Daily    insulin regular human  1-11 Units Subcutaneous Q6H       acetaminophen    morphINE **OR** morphINE **OR** morphINE    polyethylene glycol (PEG 3350)    sennosides    bisacodyl    fleet enema    ondansetron    prochlorperazine    glucose **OR** glucose **OR** glucose-vitamin C **OR** dextrose **OR** glucose **OR** glucose **OR** glucose-vitamin C

## 2025-02-11 VITALS
OXYGEN SATURATION: 98 % | DIASTOLIC BLOOD PRESSURE: 72 MMHG | HEIGHT: 70 IN | WEIGHT: 196.31 LBS | SYSTOLIC BLOOD PRESSURE: 117 MMHG | RESPIRATION RATE: 18 BRPM | HEART RATE: 66 BPM | BODY MASS INDEX: 28.1 KG/M2 | TEMPERATURE: 97 F

## 2025-02-11 LAB
GLUCOSE BLDC GLUCOMTR-MCNC: 117 MG/DL (ref 70–99)
GLUCOSE BLDC GLUCOMTR-MCNC: 194 MG/DL (ref 70–99)
GLUCOSE BLDC GLUCOMTR-MCNC: 219 MG/DL (ref 70–99)
GLUCOSE BLDC GLUCOMTR-MCNC: 378 MG/DL (ref 70–99)

## 2025-02-11 PROCEDURE — 99233 SBSQ HOSP IP/OBS HIGH 50: CPT | Performed by: INTERNAL MEDICINE

## 2025-02-11 PROCEDURE — 99239 HOSP IP/OBS DSCHRG MGMT >30: CPT | Performed by: HOSPITALIST

## 2025-02-11 RX ORDER — HYDROCODONE BITARTRATE AND ACETAMINOPHEN 7.5; 325 MG/1; MG/1
1-2 TABLET ORAL EVERY 4 HOURS PRN
Qty: 15 TABLET | Refills: 0 | Status: SHIPPED | OUTPATIENT
Start: 2025-02-11 | End: 2025-02-14

## 2025-02-11 RX ORDER — HYDROCODONE BITARTRATE AND ACETAMINOPHEN 5; 325 MG/1; MG/1
1 TABLET ORAL EVERY 4 HOURS PRN
Status: DISCONTINUED | OUTPATIENT
Start: 2025-02-11 | End: 2025-02-11

## 2025-02-11 NOTE — PROGRESS NOTES
[facility]     Gastroenterology Progress Note    Patel Lechuga Patient Status:  Inpatient    9/15/1970 MRN [id number]   Location [facility] 1W Attending Manjit Flores MD   Hosp Day # 1 PCP Aram Ann MD       Subjective:   Feeling better today. Minimal abd pain, just some bloating. No nausea or emesis. No bowel movement. No fever.     Objective:   Blood pressure 131/82, pulse 71, temperature 97.9 °F (36.6 °C), temperature source Oral, resp. rate 18, height 5' 10\" (1.778 m), weight 196 lb 4.8 oz (89 kg), SpO2 96%. Body mass index is 28.17 kg/m².    General: awake, alert and oriented, no acute distress  HEENT: moist mucus membranes  PULM: no conversational dyspnea  CARDIOVASCULAR: regular rate and rhythm, the extremities are warm and well perfused  GI: soft, non-tender, non-distended, + BS, no rebound/guarding   EXTREMITIES: no edema, moving all extremities  SKIN: no visible rash  NEURO: appropriate and interactive    Assessment and Plan:   Recurrent acute pancreatitis dating back at least 5 years. Etiology has been not clear, even had EUS  that showed non-specific changes in pancreas. IGG4 previously checked to be normal. He does have intermittent ETOH use but none in the past 6 weeks reported. H/o biliary sludge seen in US  would consider elective cholecystectomy given otherwise unexplained episodes.   Symptomatic improvement with supportive care. Will advance diet. Reduce IVF. No objections to discharge later today if tolerating diet. He should be given outpatient referral to surgery to discuss elective cholecystectomy.     Mabel Pham MD  [facility]      Results:     Lab Results   Component Value Date    WBC 7.4 02/10/2025    HGB 14.4 02/10/2025    HCT 40.8 02/10/2025    .0 02/10/2025    CREATSERUM 1.00 02/10/2025    BUN 8 (L) 02/10/2025     02/10/2025    K 3.9 02/10/2025     02/10/2025    CO2 31.0 02/10/2025     (H)  02/10/2025    CA 9.1 02/10/2025    ALB 4.6 02/09/2025    ALKPHO 116 02/09/2025    BILT 0.6 02/09/2025    TP 7.5 02/09/2025    AST 30 02/09/2025    ALT 41 02/09/2025    TSH 2.63 01/13/2019     (H) 02/09/2025    MG 1.7 05/27/2024    PHOS 3.5 05/27/2024    TROP <0.045 10/03/2020     10/03/2020    ETOH 101 (H) 07/17/2024       CT ABDOMEN+PELVIS(CONTRAST ONLY)(CPT=74177)    Result Date: 2/10/2025  CONCLUSION:  1. Acute interstitial edematous pancreatitis, which predominantly involves the pancreatic head and uncinate process.  There is mild inflammation at the pancreaticoduodenal groove.  No well-defined/drainable peripancreatic collection.  No CT findings of pancreatic necrosis.  No significant pancreatic or biliary ductal dilation. 2. Probable mild fatty liver. 3. Small calcification adjacent to the mid left ureter is unchanged over serial prior exams and therefore favored to represent an incidental phlebolith (or less likely a chronic nonobstructing mid ureteral calculus). 4. Circumferential urinary bladder wall thickening, which may relate to incomplete distention or cystitis.  If there are referable symptoms, urinalysis correlation is requested. 5. Lesser incidental findings as above.   A preliminary report was issued by the [facility] [facility] teleradiology service. There are no major discrepancies.  elm-remote  Dictated by (CST): Lui Funes MD on 2/10/2025 at 9:08 AM     Finalized by (CST): Lui Funes MD on 2/10/2025 at 9:14 AM

## 2025-02-11 NOTE — PROGRESS NOTES
[facility]  part of [facility]  Hospitalist Progress Note     Patel Lechuga Patient Status:  Inpatient    9/15/1970  54 year old CSN [id number]   Location 105/105-A Attending Manjit Flores MD   Hosp Day # 1 PCP Aram Ann MD     Assessment & Plan:   ----------------------------------  Acute pancreatitis.  Unknown cause. Lipase minimally up at the time of admission. CT and sxs c/w pancreatitis. TGs ok. Denies recent etoh. Feeling better. [place] CLD  -full liquid diet  -Aggressive IV fluids  -Pain control  -Recheck lipase as clinically warranted  -Encouraged alcohol cessation  -GI consult marquita  -Elective lee as outpt?     Hyperglycemia, hyperosmolar.  Known diabetes, latest A1c 12.3.  Inciting event(s): pancreatitis.  Blood sugars as high as 500.  No acidosis.  Symptoms are improved.  BS improved  -Frequent Accu-Cheks  -Discharge regimen TBD  -Tresiba 10  -Novolog SS  -Pt to d/w PCP starting insulin    Other problems  Pseudohyponatremia  GERD  Hepatic steatosis    DVT Mechanical Prophylaxis:   SCDs,    DVT Pharmacologic Prophylaxis   Medication    enoxaparin (Lovenox) 40 MG/0.4ML SUBQ injection 40 mg              code status: full  dispo: home upon medical clearance  If applicable, malnutrition status at bottom of note    I personally reviewed the available laboratories, imaging including. I discussed/will discuss the case with consultants. I ordered laboratories and/or radiographic studies. I adjusted medications as detailed above.  Medical decision making high, risk is high.    Subjective:   ----------------------------------  Pain improved again.  No nausea or vomiting.  Denies recent alcohol use.  Feels that he could tolerate full liquids at this point.      Objective:   Chief Complaint:   Chief Complaint   Patient presents with    Abdomen/Flank Pain     ----------------------------------  Temp:  [97.9 °F (36.6 °C)-99.5 °F (37.5 °C)] 98.9 °F (37.2 °C)  Pulse:  [71-76] 71  Resp:  [18]  18  BP: (109-131)/(79-84) 129/84  SpO2:  [94 %-97 %] 95 %  Gen: A+Ox3.  No distress.   HEENT: NCAT, neck supple, no carotid bruit.  CV: RRR, S1S2, and intact distal pulses. No gallop, rub, murmur.  Pulm: Effort and breath sounds normal. No distress, wheezes, rales, rhonchi.  Abd: Soft, minimal tenderness to palpation of bilateral upper quadrants, no rebound or guarding  Neuro: Normal reflexes, CN. Sensory/motor exams grossly normal deficit.   MS: No joint effusions.  No peripheral edema.  Skin: Skin is warm and dry. No rashes, erythema, diaphoresis.   Psych: Normal mood and affect. Calm, cooperative    Labs:  Lab Results   Component Value Date    HGB 14.4 02/10/2025    WBC 7.4 02/10/2025    .0 02/10/2025     02/10/2025    K 3.9 02/10/2025    CREATSERUM 1.00 02/10/2025    AST 30 02/09/2025    ALT 41 02/09/2025    TROP <0.045 10/03/2020            enoxaparin  40 mg Subcutaneous Daily    pantoprazole  40 mg Intravenous Daily    folic acid  1 mg Oral Daily    insulin aspart  1-7 Units Subcutaneous TID CC    insulin degludec  10 Units Subcutaneous Daily       acetaminophen    morphINE **OR** morphINE **OR** morphINE    polyethylene glycol (PEG 3350)    sennosides    bisacodyl    fleet enema    ondansetron    prochlorperazine    glucose **OR** glucose **OR** glucose-vitamin C **OR** dextrose **OR** glucose **OR** glucose **OR** glucose-vitamin C

## 2025-02-11 NOTE — DISCHARGE SUMMARY
[facility]  part of [facility]     DISCHARGE SUMMARY     Patel Lechuga Patient Status:  Inpatient    9/15/1970 MRN [id number]   Location [facility] 1W Attending Manjit Flores MD   Hosp Day # 1 PCP Aram Ann MD     DATE OF ADMISSION: 2025  DATE OF DISCHARGE:  25***  DISPOSITION: {wwdcdispo:8970::\"home\"}  CONDITION ON DISCHARGE: good    DISCHARGE DIAGNOSES:    Acute pancreatitis without infection or necrosis, unspecified pancreatitis type (HCC)  ***        HISTORY OF PRESENT ILLNESS (COPIED FROM ADMISSION H&P)  ***    HOSPITAL COURSE:  ***    Patient understands return to the emergency room for increased pain, fever, discharge, shortness of breath, chest pain, new neurologic symptoms, other concerning symptoms.    PHYSICAL EXAM:  Temp:  [97.9 °F (36.6 °C)-99.5 °F (37.5 °C)] 97.9 °F (36.6 °C)  Pulse:  [71-76] 71  Resp:  [18] 18  BP: (123-131)/(79-84) 131/82  SpO2:  [94 %-97 %] 96 %  Gen: A+Ox3.  No distress.   HEENT: NCAT, neck supple, no carotid bruit.  CV: RRR, S1S2, and intact distal pulses. No gallop, rub, murmur.  Pulm: Effort and breath sounds normal. No distress, wheezes, rales, rhonchi.  Abd: Soft, NTND, BS normal, no mass, no HSM, no rebound/guarding.   Neuro: Normal reflexes, CN. Sensory/motor exams grossly normal deficit. Coordination  and gait normal.   MS: No joint effusions.  No peripheral edema.  Skin: Skin is warm and dry. No rashes, erythema, diaphoresis.   Psych: Normal mood and affect. Behavior and judgment normal.     DISCHARGE MEDICATIONS     Discharge Medications        CONTINUE taking these medications        Instructions Prescription details   clobetasol 0.05 % Oint  Commonly known as: Temovate      Apply twice daily  Monday-Friday. Take weekends off. Use on hands and elbows.   Quantity: 60 g  Refills: 5     folic acid 1 MG Tabs  Commonly known as: Folvite      Take 1 tablet (1 mg total) by mouth daily.   Quantity: 90 tablet  Refills: 1     glipiZIDE  ER 10 MG Tb24  Commonly known as: Glucotrol XL      Take 1 tablet (10 mg total) by mouth daily with breakfast.   Quantity: 90 tablet  Refills: 1     metFORMIN HCl 1000 MG Tabs  Commonly known as: GLUCOPHAGE      Take 1 tablet (1,000 mg total) by mouth 2 (two) times daily with meals.   Quantity: 180 tablet  Refills: 1     Omeprazole 40 MG Cpdr      Take 1 capsule (40 mg total) by mouth daily.   Quantity: 90 capsule  Refills: 3     PEG 3350-KCl-Na Bicarb-NaCl 420 g Solr  Commonly known as: TriLyte      Take prep as directed by gastro office. May substitute with Trilyte/generic equivalent if needed.   Quantity: 4000 mL  Refills: 0     Skyrizi Pen 150 MG/ML Soaj  Generic drug: Risankizumab-rzaa      Inject 150 mg into the skin every 3 (three) months.   Quantity: 1 mL  Refills: 3              CONSULTANTS  Consultants         Provider   Role Specialty     Adolph Silvestre MD      Consulting Physician GASTROENTEROLOGY            FOLLOW UP:  No follow-up provider specified.  The above plan and follow-up instructions were reviewed with the patient and they verbalized understanding and agreement.  They understand to return to the emergency room for any concerning signs or symptoms.  Greater than 30 minutes spent on discharge.  -----------------------    Hospital Discharge Diagnoses: {Enter hospital discharge diagnoses here (please select the wildcards and then replace with free text; this allows us to save this data discretely for reporting purposes:5961::\"***\"}    Lace+ Score: 41  59-90 High Risk  29-58 Medium Risk  0-28   Low Risk.    TCM Follow-Up Recommendation:  {Care Managers will evaluate the need for follow-up for all patients ages 50+, and high/moderate risk patients ages 25-49. Low risk patients (LACE < 29) will only be evaluated if the \"Still recommend for TCM follow-up\" option is selected from this list.:4803}     metFORMIN HCl 1000 MG Tabs  Commonly known as: GLUCOPHAGE      Take 1 tablet (1,000 mg total) by mouth 2 (two) times daily with meals.   Quantity: 180 tablet  Refills: 1     Omeprazole 40 MG Cpdr      Take 1 capsule (40 mg total) by mouth daily.   Quantity: 90 capsule  Refills: 3     PEG 3350-KCl-Na Bicarb-NaCl 420 g Solr  Commonly known as: TriLyte      Take prep as directed by gastro office. May substitute with Trilyte/generic equivalent if needed.   Quantity: 4000 mL  Refills: 0     Skyrizi Pen 150 MG/ML Soaj  Generic drug: Risankizumab-rzaa      Inject 150 mg into the skin every 3 (three) months.   Quantity: 1 mL  Refills: 3              CONSULTANTS  Consultants         Provider   Role Specialty     Adolph Silvestre MD      Consulting Physician GASTROENTEROLOGY            FOLLOW UP:  No follow-up provider specified.  The above plan and follow-up instructions were reviewed with the patient and they verbalized understanding and agreement.  They understand to return to the emergency room for any concerning signs or symptoms.  Greater than 30 minutes spent on discharge.  -----------------------    Hospital Discharge Diagnoses: pancreatitis    Lace+ Score: 41  59-90 High Risk  29-58 Medium Risk  0-28   Low Risk.    TCM Follow-Up Recommendation:  LACE 29-58: Moderate Risk of readmission after discharge from the hospital.

## 2025-02-11 NOTE — PLAN OF CARE
Problem: Patient Centered Care  Goal: Patient preferences are identified and integrated in the patient's plan of care  Description: Interventions:  - What would you like us to know as we care for you? He has had a flare up like this in the past.   - Provide timely, complete, and accurate information to patient/family  - Incorporate patient and family knowledge, values, beliefs, and cultural backgrounds into the planning and delivery of care  - Encourage patient/family to participate in care and decision-making at the level they choose  - Honor patient and family perspectives and choices  Outcome: Progressing     Problem: Patient/Family Goals  Goal: Patient/Family Long Term Goal  Description: Patient's Long Term Goal: Be healthy    Interventions:  - Smoking, drug, and alcohol cessation education  - Dietary education  - Patient verbalizes importance of taking medications as prescribed  - Follow up with PCP outpatient  - See additional Care Plan goals for specific interventions  Outcome: Progressing  Goal: Patient/Family Short Term Goal  Description: Patient's Short Term Goal: No pain    Interventions:   - Pain medication prn  - NPO  - GI consult  - IVF  - See additional Care Plan goals for specific interventions  Outcome: Progressing     Problem: GASTROINTESTINAL - ADULT  Goal: Minimal or absence of nausea and vomiting  Description: INTERVENTIONS:  - Maintain adequate hydration with IV or PO as ordered and tolerated  - Nasogastric tube to low intermittent suction as ordered  - Evaluate effectiveness of ordered antiemetic medications  - Provide nonpharmacologic comfort measures as appropriate  - Advance diet as tolerated, if ordered  - Obtain nutritional consult as needed  - Evaluate fluid balance  Outcome: Progressing  Goal: Maintains or returns to baseline bowel function  Description: INTERVENTIONS:  - Assess bowel function  - Maintain adequate hydration with IV or PO as ordered and tolerated  - Evaluate  effectiveness of GI medications  - Encourage mobilization and activity  - Obtain nutritional consult as needed  - Establish a toileting routine/schedule  - Consider collaborating with pharmacy to review patient's medication profile  Outcome: Progressing     Problem: METABOLIC/FLUID AND ELECTROLYTES - ADULT  Goal: Glucose maintained within prescribed range  Description: INTERVENTIONS:  - Monitor Blood Glucose as ordered  - Assess for signs and symptoms of hyperglycemia and hypoglycemia  - Administer ordered medications to maintain glucose within target range  - Assess barriers to adequate nutritional intake and initiate nutrition consult as needed  - Instruct patient on self management of diabetes  Outcome: Progressing  Goal: Electrolytes maintained within normal limits  Description: INTERVENTIONS:  - Monitor labs and rhythm and assess patient for signs and symptoms of electrolyte imbalances  - Administer electrolyte replacement as ordered  - Monitor response to electrolyte replacements, including rhythm and repeat lab results as appropriate  - Fluid restriction as ordered  - Instruct patient on fluid and nutrition restrictions as appropriate  Outcome: Progressing  Goal: Hemodynamic stability and optimal renal function maintained  Description: INTERVENTIONS:  - Monitor labs and assess for signs and symptoms of volume excess or deficit  - Monitor intake, output and patient weight  - Monitor urine specific gravity, serum osmolarity and serum sodium as indicated or ordered  - Monitor response to interventions for patient's volume status, including labs, urine output, blood pressure (other measures as available)  - Encourage oral intake as appropriate  - Instruct patient on fluid and nutrition restrictions as appropriate  Outcome: Progressing   Patient tolerating clear liquids. No nausea overnight. Morphine given for pain x 1. VSS. IVF infusing.  Will continue to monitor patient.

## 2025-02-11 NOTE — PLAN OF CARE
Problem: Patient Centered Care  Goal: Patient preferences are identified and integrated in the patient's plan of care  Description: Interventions:  - What would you like us to know as we care for you? He has had a flare up like this in the past.   - Provide timely, complete, and accurate information to patient/family  - Incorporate patient and family knowledge, values, beliefs, and cultural backgrounds into the planning and delivery of care  - Encourage patient/family to participate in care and decision-making at the level they choose  - Honor patient and family perspectives and choices  Outcome: Progressing     Problem: Patient/Family Goals  Goal: Patient/Family Long Term Goal  Description: Patient's Long Term Goal: Be healthy    Interventions:  - Smoking, drug, and alcohol cessation education  - Dietary education  - Patient verbalizes importance of taking medications as prescribed  - Follow up with PCP outpatient  - See additional Care Plan goals for specific interventions  Outcome: Progressing  Goal: Patient/Family Short Term Goal  Description: Patient's Short Term Goal: No pain    Interventions:   - Pain medication prn  - NPO  - GI consult  - IVF  - See additional Care Plan goals for specific interventions  Outcome: Progressing     Problem: GASTROINTESTINAL - ADULT  Goal: Minimal or absence of nausea and vomiting  Description: INTERVENTIONS:  - Maintain adequate hydration with IV or PO as ordered and tolerated  - Nasogastric tube to low intermittent suction as ordered  - Evaluate effectiveness of ordered antiemetic medications  - Provide nonpharmacologic comfort measures as appropriate  - Advance diet as tolerated, if ordered  - Obtain nutritional consult as needed  - Evaluate fluid balance  Outcome: Progressing  Goal: Maintains or returns to baseline bowel function  Description: INTERVENTIONS:  - Assess bowel function  - Maintain adequate hydration with IV or PO as ordered and tolerated  - Evaluate  effectiveness of GI medications  - Encourage mobilization and activity  - Obtain nutritional consult as needed  - Establish a toileting routine/schedule  - Consider collaborating with pharmacy to review patient's medication profile  Outcome: Progressing     Problem: METABOLIC/FLUID AND ELECTROLYTES - ADULT  Goal: Glucose maintained within prescribed range  Description: INTERVENTIONS:  - Monitor Blood Glucose as ordered  - Assess for signs and symptoms of hyperglycemia and hypoglycemia  - Administer ordered medications to maintain glucose within target range  - Assess barriers to adequate nutritional intake and initiate nutrition consult as needed  - Instruct patient on self management of diabetes  Outcome: Progressing  Goal: Electrolytes maintained within normal limits  Description: INTERVENTIONS:  - Monitor labs and rhythm and assess patient for signs and symptoms of electrolyte imbalances  - Administer electrolyte replacement as ordered  - Monitor response to electrolyte replacements, including rhythm and repeat lab results as appropriate  - Fluid restriction as ordered  - Instruct patient on fluid and nutrition restrictions as appropriate  Outcome: Progressing  Goal: Hemodynamic stability and optimal renal function maintained  Description: INTERVENTIONS:  - Monitor labs and assess for signs and symptoms of volume excess or deficit  - Monitor intake, output and patient weight  - Monitor urine specific gravity, serum osmolarity and serum sodium as indicated or ordered  - Monitor response to interventions for patient's volume status, including labs, urine output, blood pressure (other measures as available)  - Encourage oral intake as appropriate  - Instruct patient on fluid and nutrition restrictions as appropriate  Outcome: Progressing   Vital signs stable. Pain controlled with morphine. Tolerating clear diet well,no nausea or vomiting. Continue to monitor. Bed on low and locked position, call light within  reach.

## 2025-02-11 NOTE — PAYOR COMM NOTE
--------------  ADMISSION REVIEW     Payor: [facility]  Subscriber #:  [id number]  Authorization Number: [id number]    Admit date: 2/10/25  Admit time: 12:30 AM       REVIEW DOCUMENTATION:     ED Provider Notes        ED Provider Notes signed by Courtney Hale MD at 2025 11:57 PM      [facility] Emergency Department Note  Patient: Patel Lechuga Age: 54 year old Sex: male      MRN: [id number]  : 9/15/1970    Patient Seen in: [facility] Emergency Department    History     Chief Complaint   Patient presents with    Abdomen/Flank Pain     Stated Complaint: Pancreatitis    History obtained from: Patient    54-year-old male with a past medical history of diabetes, hyperlipidemia, recurrent pancreatitis believed to be secondary to alcohol and tobacco use, psoriasis, depression, eczema presenting for evaluation of 5 days of epigastric abdominal pain.  He states he has been having constant pain in his epigastrium that radiates to his back associated nausea but no vomiting.  No fevers.  No diarrhea.  States that symptoms are identical to his prior episodes of pancreatitis.    Review of Systems:  Review of Systems  Positive for stated complaint: Pancreatitis. Constitutional and vital signs reviewed. All other systems reviewed and negative except as noted above.    Patient History:  Past Medical History:    Depression    Diabetes (HCC)    Eczema    Hyperlipidemia    Liver disease    Pancreatitis (HCC)    Psoriasis     Physical Exam     ED Triage Vitals [25]   BP (!) 149/91   Pulse 83   Resp 18   Temp 98.1 °F (36.7 °C)   Temp src Oral   SpO2 98 %   O2 Device None (Room air)       Current:/88   Pulse 73   Temp 98.1 °F (36.7 °C) (Oral)   Resp 16   Ht 177.8 cm (5' 10\")   Wt 93 kg   SpO2 98%   BMI 29.41 kg/m²         Physical Exam  Constitutional:       Appearance: He is well-developed.   HENT:      Head: Normocephalic and atraumatic.      Right Ear: External ear  normal.      Left Ear: External ear normal.      Nose: Nose normal.   Eyes:      Conjunctiva/sclera: Conjunctivae normal.      Pupils: Pupils are equal, round, and reactive to light.   Cardiovascular:      Rate and Rhythm: Normal rate and regular rhythm.      Heart sounds: Normal heart sounds.   Pulmonary:      Effort: Pulmonary effort is normal.      Breath sounds: Normal breath sounds.   Abdominal:      General: Bowel sounds are normal.      Palpations: Abdomen is soft.      Tenderness: There is abdominal tenderness in the epigastric area.   Musculoskeletal:         General: Normal range of motion.      Cervical back: Normal range of motion and neck supple.   Skin:     General: Skin is warm and dry.      Findings: No rash.   Neurological:      General: No focal deficit present.      Mental Status: He is alert and oriented to person, place, and time.      Deep Tendon Reflexes: Reflexes are normal and symmetric.   Psychiatric:         Mood and Affect: Mood normal.         Behavior: Behavior normal.         ED Course   Labs:   Labs Reviewed   COMP METABOLIC PANEL (14) - Abnormal; Notable for the following components:       Result Value    Glucose 503 (*)     Sodium 129 (*)     Chloride 96 (*)     BUN/CREA Ratio 8.9 (*)     All other components within normal limits   CBC WITH DIFFERENTIAL WITH PLATELET - Abnormal; Notable for the following components:    RDW-SD 34.6 (*)     All other components within normal limits   LIPASE - Abnormal; Notable for the following components:    Lipase 272 (*)     All other components within normal limits   URINALYSIS WITH CULTURE REFLEX - Abnormal; Notable for the following components:    Spec Gravity >1.030 (*)     Glucose Urine >1000 (*)     All other components within normal limits   POCT GLUCOSE - Abnormal; Notable for the following components:    POC Glucose  407 (*)     All other components within normal limits   POCT GLUCOSE - Abnormal; Notable for the following components:     POC Glucose  317 (*)     All other components within normal limits     Radiology findings:  I personally reviewed the images.   No results found.    CT ABDOMEN PELVIS WITH CONTRAST     COMPARISON: 10/3/2020     IMPRESSION:    Acute pancreatitis with pancreatic head edema and mild adjacent fat stranding.  No loculated fluid collections or evidence of necrosis.    Hepatic steatosis.  Normal gallbladder.  No biliary dilation.    Chronic incidental finding of a 4 mm calcification within or closely approximating the mid to distal left ureter (2/94).  No hydronephrosis.  No change from 2020.    Moderate colonic stool.    Additional nonacute: Normal appendix.  No diverticulitis.  No bowel obstruction.  No urinary obstruction.  Normal aorta.  Lung bases clear.      Cardiac Monitor: Interpreted by me.   Pulse Readings from Last 1 Encounters:   02/09/25 73   , sinus,     External non-ED records reviewed independently by me: GI note from 2/5/2025 reviewed confirming patient has a past medical history of recurrent pancreatitis secondary to tobacco and alcohol use with endoscopic ultrasound from 7/2022 without concerning finding.  Also with history of hepatic steatosis suspected related to alcohol use.    MDM   54-year-old male with a past medical history of diabetes, hyperlipidemia, recurrent pancreatitis, psoriasis, depression, eczema presenting for evaluation of 5 days of epigastric abdominal pain rating to the back associate with nausea.  On exam, he is tender in the epigastrium but otherwise hemodynamically stable.    Differential diagnoses considered includes, but is not limited to: Pancreatitis, GERD, gastritis, biliary obstructive process.    Will obtain the following tests: CBC, CMP, lipase, urinalysis, CT abdomen pelvis with IV contrast  Please see ED course for my independent review of these tests/imaging results.    Initial Medications/Therapeutics administered: Morphine, Zofran, NS bolus, subcutaneous  insulin    Chronic conditions affecting care: Diabetes, hyperlipidemia, recurrent pancreatitis, psoriasis, depression, eczema    Workup and medications considered but not ordered: None    Social Determinants of Health that impacted care: None     ED course: Laboratory workup with lipase elevated to 272.  I independently reviewed the CT abdomen pelvis images that show evidence of acute pancreatitis.  Agree with radiology read above.  Overall concerning for acute recurrence of his pancreatitis.  Also with hyperglycemia with glucose of 503.  No evidence of DKA.  He is receiving NS bolus.  Will also give subcutaneous insulin.  I endorsed patient's case to the [place] hospitalist, Dr. Deluca, who accepted the patient for admission.  Also endorsed to ANTIONE Yates, who is made aware of new consult.      Disposition and Plan     Clinical Impression:  1. Acute pancreatitis without infection or necrosis, unspecified pancreatitis type (HCC)        Disposition:  Admit    Hospital Problems       Present on Admission  Date Reviewed: 2/5/2025            ICD-10-CM Noted POA    * (Principal) Acute pancreatitis without infection or necrosis, unspecified pancreatitis type (HCC) K85.90 2/9/2025 Unknown        This note may have been created using voice dictation technology and may include inadvertent errors.      Courtney Hale MD  Emergency Medicine          Signed by Courtney Hale MD on 2/9/2025 11:57 PM         History and Physical    H&P signed by Anat Deluca MD at 2/10/2025  2:16 AM    [facility]  part of [facility]     History & Physical       Date:  2/10/2025  Date of Admission:  2/9/2025     Chief Complaint:      Chief Complaint   Patient presents with    Abdomen/Flank Pain         Assessment and Plan:     History of recurrent pancreatitis 2/2 alcohol/ethanol use  Epigastric pain  Acute pancreatitis  -Elevated lipase 272 with imaging revealing acute pancreatitis with pancreatic head edema but no  fluid collections or evidence of necrosis.  Patient denies recent alcohol use or tobacco use.  No clear etiology of his recurrent pancreatitis.  -Check triglyceride level  -Maintain n.p.o.  -Aggressive IV fluid hydration  -Pain control with morphine as needed  -Gastroenterology on consult, appreciate recs     Uncontrolled DM type II  Hyperglycemia  -Blood glucose on presentation 503, no DKA, normal anion gap.  Patient treated with 15 units NovoLog with improvement of his blood glucose to 317.  -Hold home dose of metformin/glipizide  -Initiate scheduled high-dose insulin sliding scale every 6 hours and closely monitor blood glucose.  Hypoglycemia protocol.  -Most recent hemoglobin A1c 12.4 1/2025     Pseudohyponatremia  -Sodium level 129 but when corrected for blood glucose 503, it is actually 135.     History of hepatic steatosis  -Patient will follow-up as an outpatient with hepatologist for further evaluation of his hepatic steatosis     GERD  Eczema     Prophylaxis  Subcutaneous heparin     CODE STATUS  Full  History of Present Illness:  Patel Lechuga is a(n) 54 year old male, who presents for evaluation of epigastric pain. PMHx significant for recurrent pancreatitis, NIDDM type II, hyperlipidemia, hepatic steatosis, GERD, eczema.  Patient developed epigastric pain radiating to the back since Wednesday, progressively worsened.  Initially the pain was constant associated with nausea but no vomiting.  He rates the pain 12 out of 10.  He reports similar pain in the past when he had pancreatitis.  Denies any alcohol use or tobacco use.  In the past his pancreatitis was thought to be secondary to alcohol use/tobacco use.  No melena or hematochezia.  Of note, patient saw his GI doctor on Wednesday 2/5/25 and during his office visit there was no concern for epigastric pain/abdominal pain.  He was actually referred to hepatologist for further evaluation of his hepatic steatosis.  He has not made an appointment yet.   Patient denies chest pain or shortness of breath.  No fever or chills.  He denies any other symptoms.  On presentation to the ED, initial vital signs remarkable for elevated blood pressure 149/91.  Lab work significant for blood glucose 503 with sodium 129, no DKA, normal anion gap, lipase elevated 272.  CT abdomen pelvis reveals acute pancreatitis with pancreatic head edema but no fluid collections or evidence of necrosis.  Patient was treated with insulin 15 units which brought his blood glucose to 317.  He was also treated with morphine 4 mg x 3, Zofran, 1 L IV fluid.  He was admitted under hospitalist service with consultation to gastroenterology.               CONSULT  Imaging:  CT ABDOMEN+PELVIS(CONTRAST ONLY)(CPT=74177)     Result Date: 2/10/2025  CONCLUSION:         1. Acute interstitial edematous pancreatitis, which predominantly involves the pancreatic head and uncinate process.  There is mild inflammation at the pancreaticoduodenal groove.  No well-defined/drainable peripancreatic collection.  No CT findings of pancreatic necrosis.  No significant pancreatic or biliary ductal dilation. 2. Probable mild fatty liver. 3. Small calcification adjacent to the mid left ureter is unchanged over serial prior exams and therefore favored to represent an incidental phlebolith (or less likely a chronic nonobstructing mid ureteral calculus). 4. Circumferential urinary bladder wall thickening, which may relate to incomplete distention or cystitis.  If there are referable symptoms, urinalysis correlation is requested. 5. Lesser incidental findings as above.   A preliminary report was issued by the [employer] Rad[facility] teleradiology service. There are no major discrepancies.  elm-remote  Dictated by (CST): Lui Funes MD on 2/10/2025 at 9:08 AM     Finalized by (CST): uLi Funes MD on 2/10/2025 at 9:14 AM            Assessment & Plan   Patel Lechuga is a 54 year old male w/ PMHx of 28.31, Pancreatitis, Depression,  DM, Eczema, HLD, Psoriasis who presents to the ED with worsening epigastric ABD pain.     #Pancreatitis  -Labs on admission with glucose 503, , lipase 272, LFTs WNL, no leukocytosis or anemia  -CT A/P with acute interstitial edematous pancreatitis, mild inflammation at the pancreaticoduodenal groove, no well-defined/drainable peripancreatic collection or necrosis, no biliary ductal dilation  -Etiology remains unclear, previous occurrences thought related to ETOH/smoking but Pt states last drink was 24.  He no longer smokes.  IGG4 WNL in  and .  TG elevated but not enough to typically cause acute pancreatitis.  No trauma.  LFTs WNL and no cholelithiasis on recent liver US elastography.  -Pt is feeling somewhat improved today with IVF.  He would like to trial CLD and progress as able throughout the day.  Counseled on ETOH/smoking cessation/abstinence, good diabetes control, and low fat/high protein diet.  Pt states understanding.     Recommend:  -LR at 150 ml/hr  -CLD, if tolerates may progress to low fat/high protein  -Antiemetics and pain regimen per primary  -ETOH/smoking abstinence     Thank you for the opportunity to participate in the care of this patient.     Case discussed with Mabel Pham MD and Francesca SEQUEIRA.     Tasha Alonso [facility] ([place], [place]), FNP-[facility]  2/10/2025     2/10       Date of Service: 2/10/2025 12:58 PM     Signed                  Patel Lechuga Patient Status:  Inpatient    9/15/1970  54 year old CSN [id number]   Location 105/105-A Attending Manjit Flores MD   Hosp Day # 0 PCP Aram Ann MD      Assessment & Plan:   ----------------------------------  Acute pancreatitis.  Unknown cause. Lipase minimally up at the time of admission. CT and sxs c/w pancreatitis. TGs ok. Denies recent etoh.  -CLD  -Aggressive IV fluids  -Pain control  -Recheck lipase as clinically warranted  -Encouraged alcohol cessation  -GI consult pend     Hyperglycemia, hyperosmolar.  Known  diabetes, latest A1c 12.3.  Inciting event(s): pancreatitis.  Blood sugars as high as 500.  No acidosis.  Symptoms are improved.  BS improved  -Frequent Accu-Cheks  -Discharge regimen TBD  -Tresiba 10  -Novolog SS     Other problems  Pseudohyponatremia  GERD  Hepatic steatosis     DVT Mechanical Prophylaxis:   SCDs,        DVT Pharmacologic Prophylaxis   Medication    enoxaparin (Lovenox) 40 MG/0.4ML SUBQ injection 40 mg               code status: full  dispo: home upon medical clearance  If applicable, malnutrition status at bottom of note     I personally reviewed the available laboratories, imaging including. I discussed/will discuss the case with consultants. I ordered laboratories and/or radiographic studies. I adjusted medications as detailed above.  Medical decision making high, risk is high.     Subjective:   ----------------------------------  Pain improved somewhat.  No nausea or vomiting.  Denies recent alcohol use.  Feels that he could tolerate clear liquids at this point.        Objective:   Chief Complaint:       Chief Complaint   Patient presents with    Abdomen/Flank Pain      ----------------------------------  Temp:  [97.9 °F (36.6 °C)-98.4 °F (36.9 °C)] 97.9 °F (36.6 °C)  Pulse:  [72-83] 72  Resp:  [16-19] 18  BP: (109-149)/(79-91) 109/79  SpO2:  [95 %-99 %] 97 %  Gen: A+Ox3.  No distress.   HEENT: NCAT, neck supple, no carotid bruit.  CV: RRR, S1S2, and intact distal pulses. No gallop, rub, murmur.  Pulm: Effort and breath sounds normal. No distress, wheezes, rales, rhonchi.  Abd: Soft, mild tenderness to palpation of bilateral upper quadrants, no rebound or guarding  Neuro:  Normal reflexes, CN. Sensory/motor exams grossly normal deficit.   MS: No joint effusions.  No peripheral edema.  Skin: Skin is warm and dry. No rashes, erythema, diaphoresis.   Psych:   Normal mood and affect. Calm, cooperative     Labs:        Lab Results   Component Value Date     HGB 14.4 02/10/2025     WBC 7.4  02/10/2025     .0 02/10/2025      02/10/2025     K 3.9 02/10/2025     CREATSERUM 1.00 02/10/2025     AST 30 02/09/2025     ALT 41 02/09/2025     TROP <0.045 10/03/2020              enoxaparin  40 mg Subcutaneous Daily    pantoprazole  40 mg Intravenous Daily    folic acid  1 mg Oral Daily    insulin regular human  1-11 Units Subcutaneous Q6H                      2/11          Date of Service: 2/11/2025  6:51 AM     Signed              Assessment & Plan:   ----------------------------------  Acute pancreatitis.  Unknown cause. Lipase minimally up at the time of admission. CT and sxs c/w pancreatitis. TGs ok. Denies recent etoh. Feeling better. [place] CLD  -full liquid diet  -Aggressive IV fluids  -Pain control  -Recheck lipase as clinically warranted  -Encouraged alcohol cessation  -GI consult marquita  -Elective lee as outpt?     Hyperglycemia, hyperosmolar.  Known diabetes, latest A1c 12.3.  Inciting event(s): pancreatitis.  Blood sugars as high as 500.  No acidosis.  Symptoms are improved.  BS improved  -Frequent Accu-Cheks  -Discharge regimen TBD  -Tresiba 10  -Novolog SS  -Pt to d/w PCP starting insulin     Other problems  Pseudohyponatremia  GERD  Hepatic steatosis     DVT Mechanical Prophylaxis:   SCDs,        DVT Pharmacologic Prophylaxis   Medication    enoxaparin (Lovenox) 40 MG/0.4ML SUBQ injection 40 mg               code status: full  dispo: home upon medical clearance  If applicable, malnutrition status at bottom of note     I personally reviewed the available laboratories, imaging including. I discussed/will discuss the case with consultants. I ordered laboratories and/or radiographic studies. I adjusted medications as detailed above.  Medical decision making high, risk is high.     Subjective:   ----------------------------------  Pain improved again.  No nausea or vomiting.  Denies recent alcohol use.  Feels that he could tolerate full liquids at this point.        Objective:   Chief  Complaint:       Chief Complaint   Patient presents with    Abdomen/Flank Pain      ----------------------------------  Temp:  [97.9 °F (36.6 °C)-99.5 °F (37.5 °C)] 98.9 °F (37.2 °C)  Pulse:  [71-76] 71  Resp:  [18] 18  BP: (109-131)/(79-84) 129/84  SpO2:  [94 %-97 %] 95 %  Gen: A+Ox3.  No distress.   HEENT: NCAT, neck supple, no carotid bruit.  CV: RRR, S1S2, and intact distal pulses. No gallop, rub, murmur.  Pulm: Effort and breath sounds normal. No distress, wheezes, rales, rhonchi.  Abd: Soft, minimal tenderness to palpation of bilateral upper quadrants, no rebound or guarding  Neuro:  Normal reflexes, CN. Sensory/motor exams grossly normal deficit.   MS: No joint effusions.  No peripheral edema.  Skin: Skin is warm and dry. No rashes, erythema, diaphoresis.   Psych:   Normal mood and affect. Calm, cooperative     Labs:        Lab Results   Component Value Date     HGB 14.4 02/10/2025     WBC 7.4 02/10/2025     .0 02/10/2025      02/10/2025     K 3.9 02/10/2025     CREATSERUM 1.00 02/10/2025     AST 30 02/09/2025     ALT 41 02/09/2025     TROP <0.045 10/03/2020              enoxaparin  40 mg Subcutaneous Daily    pantoprazole  40 mg Intravenous Daily    folic acid  1 mg Oral Daily    insulin aspart  1-7 Units Subcutaneous TID CC    insulin degludec  10 Units Subcutaneous Daily                        MEDICATIONS ADMINISTERED IN LAST 1 DAY:  enoxaparin (Lovenox) 40 MG/0.4ML SUBQ injection 40 mg       Date Action Dose Route User    2/11/2025 0815 Given 40 mg Subcutaneous (Right Lower Abdomen) Little Reid RN          folic acid (Folvite) tab 1 mg       Date Action Dose Route User    2/11/2025 0804 Given 1 mg Oral Little Reid RN          HYDROcodone-acetaminophen (Norco) 5-325 MG per tab 1 tablet       Date Action Dose Route User    2/11/2025 0948 Given 1 tablet Oral Little Reid RN          insulin aspart (NovoLOG) 100 Units/mL FlexPen 1-7 Units       Date Action Dose Route User     2/11/2025 0740 Given 3 Units Subcutaneous (Left Upper Abdomen) Little Reid RN    2/10/2025 1805 Given 1 Units Subcutaneous (Right Upper Abdomen) Little Reid RN          insulin aspart (NovoLOG) 100 Units/mL FlexPen 18 Units       Date Action Dose Route User    2/11/2025 1215 Given 18 Units Subcutaneous (Left Upper Abdomen) Little Reid RN          insulin degludec (Tresiba) 100 units/mL flextouch 10 Units       Date Action Dose Route User    2/11/2025 0816 Given 10 Units Subcutaneous (Right Upper Abdomen) Little Reid RN    2/10/2025 1519 Given 10 Units Subcutaneous (Right Lower Abdomen) Little Reid RN          lactated ringers infusion       Date Action Dose Route User    2/11/2025 1030 New Bag (none) Intravenous Little Reid RN    2/11/2025 0650 Rate/Dose Change (none) Intravenous Shannon Orlando RN    2/10/2025 1701 New Bag (none) Intravenous Little Reid RN          morphINE PF 4 MG/ML injection 4 mg       Date Action Dose Route User    2/11/2025 0721 Given 4 mg Intravenous Shannon Orlando RN    2/11/2025 0532 Given 4 mg Intravenous Shannon Orlando RN          pantoprazole (Protonix) 40 mg in sodium chloride 0.9% PF 10 mL IV push       Date Action Dose Route User    2/11/2025 0805 Given 40 mg Intravenous Little Reid RN            Vitals (last day)       Date/Time Temp Pulse Resp BP SpO2 Weight O2 Device O2 Flow Rate (L/min) Who    02/11/25 0727 97.9 °F (36.6 °C) 71 18 131/82 96 % -- None (Room air) -- MM    02/11/25 0526 98.9 °F (37.2 °C) 71 18 129/84 95 % -- None (Room air) -- AC    02/11/25 0500 -- -- -- -- -- 196 lb 4.8 oz (89 kg) -- --     02/10/25 1945 99.5 °F (37.5 °C) 76 18 131/79 94 % -- None (Room air) -- AC    02/10/25 1420 99 °F (37.2 °C) 72 18 123/81 97 % -- None (Room air) -- EL    02/10/25 0735 97.9 °F (36.6 °C) 72 18 109/79 97 % -- None (Room air) -- EL    02/10/25 0251 98.1 °F (36.7 °C) 78 18 115/79 97 % -- -- -- DS    02/10/25 0028  98.4 °F (36.9 °C) 74 18 125/79 98 % 197 lb 4.8 oz (89.5 kg) -- -- DS    02/10/25 0015 -- 77 16 124/80 95 % -- None (Room air) -- MB    02/10/25 0000 -- 77 19 131/90 96 % -- None (Room air) -- MB

## 2025-02-12 NOTE — PAYOR COMM NOTE
--------------  DISCHARGE REVIEW    Payor: [facility]  Subscriber #:  [id number]  Authorization Number: [id number]    Admit date: 2/10/25  Admit time:  12:30 AM  Discharge Date: 2/11/2025  8:00 PM     Admitting Physician: Anat Deluca MD  Attending Physician:  Alina Rodgers MD  Primary Care Physician: Aram Ann MD

## 2025-02-13 ENCOUNTER — APPOINTMENT (OUTPATIENT)
Dept: ENDOCRINOLOGY | Facility: HOSPITAL | Age: 55
End: 2025-02-13
Attending: INTERNAL MEDICINE
Payer: MEDICAID

## 2025-02-13 ENCOUNTER — OFFICE VISIT (OUTPATIENT)
Dept: SURGERY | Facility: CLINIC | Age: 55
End: 2025-02-13
Payer: MEDICAID

## 2025-02-13 ENCOUNTER — LAB ENCOUNTER (OUTPATIENT)
Dept: LAB | Facility: HOSPITAL | Age: 55
End: 2025-02-13
Attending: SURGERY
Payer: MEDICAID

## 2025-02-13 VITALS
HEIGHT: 70 IN | HEART RATE: 72 BPM | SYSTOLIC BLOOD PRESSURE: 137 MMHG | BODY MASS INDEX: 28.06 KG/M2 | WEIGHT: 196 LBS | DIASTOLIC BLOOD PRESSURE: 84 MMHG

## 2025-02-13 DIAGNOSIS — K85.00 IDIOPATHIC ACUTE PANCREATITIS, UNSPECIFIED COMPLICATION STATUS (HCC): ICD-10-CM

## 2025-02-13 DIAGNOSIS — K85.00 IDIOPATHIC ACUTE PANCREATITIS, UNSPECIFIED COMPLICATION STATUS (HCC): Primary | ICD-10-CM

## 2025-02-13 LAB
ALBUMIN SERPL-MCNC: 4.5 G/DL (ref 3.2–4.8)
ALBUMIN/GLOB SERPL: 1.4 {RATIO} (ref 1–2)
ALP LIVER SERPL-CCNC: 99 U/L
ALT SERPL-CCNC: 44 U/L
ANION GAP SERPL CALC-SCNC: 9 MMOL/L (ref 0–18)
AST SERPL-CCNC: 51 U/L (ref ?–34)
BILIRUB SERPL-MCNC: 0.6 MG/DL (ref 0.3–1.2)
BUN BLD-MCNC: 14 MG/DL (ref 9–23)
BUN/CREAT SERPL: 13.1 (ref 10–20)
CALCIUM BLD-MCNC: 9.6 MG/DL (ref 8.7–10.4)
CHLORIDE SERPL-SCNC: 99 MMOL/L (ref 98–112)
CO2 SERPL-SCNC: 26 MMOL/L (ref 21–32)
CREAT BLD-MCNC: 1.07 MG/DL
EGFRCR SERPLBLD CKD-EPI 2021: 83 ML/MIN/1.73M2 (ref 60–?)
FASTING STATUS PATIENT QL REPORTED: NO
GLOBULIN PLAS-MCNC: 3.3 G/DL (ref 2–3.5)
GLUCOSE BLD-MCNC: 246 MG/DL (ref 70–99)
LIPASE SERPL-CCNC: 83 U/L (ref 12–53)
OSMOLALITY SERPL CALC.SUM OF ELEC: 287 MOSM/KG (ref 275–295)
POTASSIUM SERPL-SCNC: 4.3 MMOL/L (ref 3.5–5.1)
PROT SERPL-MCNC: 7.8 G/DL (ref 5.7–8.2)
SODIUM SERPL-SCNC: 134 MMOL/L (ref 136–145)

## 2025-02-13 PROCEDURE — 80053 COMPREHEN METABOLIC PANEL: CPT

## 2025-02-13 PROCEDURE — 83690 ASSAY OF LIPASE: CPT

## 2025-02-13 PROCEDURE — 36415 COLL VENOUS BLD VENIPUNCTURE: CPT

## 2025-02-13 PROCEDURE — 99204 OFFICE O/P NEW MOD 45 MIN: CPT | Performed by: SURGERY

## 2025-02-13 NOTE — H&P
Chief complaint:   Chief Complaint   Patient presents with    Abdominal Pain     Referred by Dr. Aram Ann for pancreatitis.        HPI: Patel is a very pleasant diabetic male, recurrent recently was admitted to the hospital for recurrent acute pancreatitis dating back to 2015. Etiology has been not clear, even had EUS 2022 that showed non-specific changes in pancreas. IGG4 previously checked to be normal. He does have intermittent ETOH use but none in the past 6 weeks reported. H/o biliary sludge seen in . Dr. Pham (GI) recommended proceeding with  elective cholecystectomy given otherwise unexplained episodes.   He experienced symptomatic improvement with supportive care. He still has some epigastric pain and back pain  and he feels bloated in the upper abdomen. No J/F/C.     He has a h/o depression with suicidal ideation. He was on medication, however did not  have much result and is now working with a therapist for his depression.     Past medical history:   Past Medical History:    Depression    Diabetes (HCC)    Eczema    Hyperlipidemia    Liver disease    Pancreatitis (HCC)    Psoriasis       Past surgical history:   Past Surgical History:   Procedure Laterality Date    Colonoscopy screening - referral N/A 07/25/2022    Procedure: COLONOSCOPY-SCREENING/ ESOPHAGOGASTRODUODENOSCOPY (EGD)/ENDOSCOPIC ULTRASOUND with popssible Fine needle aspiration;  Surgeon: Eliezer Roth MD;  Location: [facility] ENDOSCOPY    Hernia surgery         Allergies: Allergies[1]    Medications:   Current Outpatient Medications   Medication Sig Dispense Refill    HYDROcodone-acetaminophen 7.5-325 MG Oral Tab Take 1-2 tablets by mouth every 4 (four) hours as needed for Pain. 15 tablet 0    Naloxone HCl 4 MG/0.1ML Nasal Liquid 4 mg by Nasal route as needed. If patient remains unresponsive, repeat dose in other nostril 2-5 minutes after first dose. 1 kit 0    PEG 3350-KCl-Na Bicarb-NaCl (TRILYTE) 420 g Oral Recon Soln Take prep as  directed by gastro office. May substitute with Trilyte/generic equivalent if needed. 4000 mL 0    clobetasol 0.05 % External Ointment Apply twice daily  Monday-Friday. Take weekends off. Use on hands and elbows. 60 g 5    Risankizumab-rzaa (SKYRIZI PEN) 150 MG/ML Subcutaneous Solution Auto-injector Inject 150 mg into the skin every 3 (three) months. 1 mL 3    metFORMIN HCl 1000 MG Oral Tab Take 1 tablet (1,000 mg total) by mouth 2 (two) times daily with meals. 180 tablet 1    folic acid 1 MG Oral Tab Take 1 tablet (1 mg total) by mouth daily. 90 tablet 1    Omeprazole 40 MG Oral Capsule Delayed Release Take 1 capsule (40 mg total) by mouth daily. 90 capsule 3    glipiZIDE ER 10 MG Oral Tablet 24 Hr Take 1 tablet (10 mg total) by mouth daily with breakfast. 90 tablet 1       Social history:   Social History     Socioeconomic History    Marital status: Single   Tobacco Use    Smoking status: Former     Types: Cigarettes     Start date: 2/4/2025     Passive exposure: Current    Smokeless tobacco: Never    Tobacco comments:     3-4 a day    Vaping Use    Vaping status: Every Day    Last attempt to quit: 12/8/2024    Substances: Nicotine    Devices: Disposable   Substance and Sexual Activity    Alcohol use: Not Currently     Comment: drank 12 beers Sunday night (7-). He also reported having 5 beers last night and also used cocaine. He was previously sober for 22 months.    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 relapsed sunday used crack cocaine on 7-18-24  ( $50.00 of cocaine)        Family history:  Family History   Problem Relation Age of Onset    Diabetes Mother     Cancer Sister         breast cancer    No Known Problems Brother     No Known Problems Brother     No Known Problems Brother         Review of Systems:   GENERAL: feels generally well  SKIN: no ulcerated or worrisome skin lesions  EYES:denies blurred vision or double vision  HEENT: denies  new nasal congestion, sinus pain or ST  LUNGS: denies shortness of breath with exertion  CARDIOVASCULAR: denies chest pain on exertion  GI: no hematemesis, no BRBPR, no worsening heartburn  : no dysuria, no blood in urine, no difficulty urinating  MUSCULOSKELETAL: no new musculoskeletal complaints  NEURO: no persistent, recurrent  headaches  PSYCHE:no depression or anxiety  HEMATOLOGIC: no hx of blood dyscrasia  ENDOCRINE: no new endocrine problems  ALL/ASTHMA: no new hx of severe allergy or asthma  BACK: normal, no spinal deformity, no CVA tenderness    Physical examination:     Constitutional: appears well hydrated alert and responsive no acute distress noted  HEENT wnl, anicteric, PERRL, normocephalic, atraumatic  Neck supple, norm ROM, no JVD  L CTA B  H Reg rate  Abd soft, slt T epigastric and RUQ, ND, no masses, no hernias, no HSM.  Extr no c/c/e  Skin intact, no jaundice, no rashes, no lesions  Neuro grossly intact, no focal deficits, no tremors  Back no deformity, no CVA tnd.         Assessment and plan:  Medical clearance with improvement of glu control.   Labs today.  MRCP pre op.  Plan laparoscopic cholecystectomy, possible open, possible IOC.  We have discussed the surgical risks, benefits, alternatives, and expected recovery. All of the patient's questions have been answered to his satisfaction.           Shannon Norton MD  2/13/2025  1:38 PM       [1]   Allergies  Allergen Reactions    Cat Hair Extract ITCHING    Dander ITCHING      Cat dander: Watery eyes, sneezing

## 2025-02-14 ENCOUNTER — OFFICE VISIT (OUTPATIENT)
Dept: INTERNAL MEDICINE CLINIC | Facility: CLINIC | Age: 55
End: 2025-02-14

## 2025-02-14 VITALS
HEART RATE: 80 BPM | HEIGHT: 70 IN | SYSTOLIC BLOOD PRESSURE: 108 MMHG | WEIGHT: 205 LBS | BODY MASS INDEX: 29.35 KG/M2 | RESPIRATION RATE: 17 BRPM | DIASTOLIC BLOOD PRESSURE: 68 MMHG | TEMPERATURE: 98 F | OXYGEN SATURATION: 100 %

## 2025-02-14 DIAGNOSIS — I10 PRIMARY HYPERTENSION: ICD-10-CM

## 2025-02-14 DIAGNOSIS — Z09 HOSPITAL DISCHARGE FOLLOW-UP: Primary | ICD-10-CM

## 2025-02-14 DIAGNOSIS — K86.1 CHRONIC PANCREATITIS, UNSPECIFIED PANCREATITIS TYPE (HCC): ICD-10-CM

## 2025-02-14 DIAGNOSIS — K85.90 ACUTE PANCREATITIS WITHOUT INFECTION OR NECROSIS, UNSPECIFIED PANCREATITIS TYPE (HCC): ICD-10-CM

## 2025-02-14 DIAGNOSIS — E11.00 TYPE 2 DIABETES MELLITUS WITH HYPEROSMOLARITY WITHOUT COMA, WITHOUT LONG-TERM CURRENT USE OF INSULIN (HCC): ICD-10-CM

## 2025-02-14 DIAGNOSIS — K70.2 FIBROSIS OF LIVER DUE TO ALCOHOL: ICD-10-CM

## 2025-02-14 PROCEDURE — 99214 OFFICE O/P EST MOD 30 MIN: CPT | Performed by: INTERNAL MEDICINE

## 2025-02-14 RX ORDER — HYDROCODONE BITARTRATE AND ACETAMINOPHEN 7.5; 325 MG/1; MG/1
1 TABLET ORAL 2 TIMES DAILY PRN
Qty: 15 TABLET | Refills: 0 | Status: SHIPPED | OUTPATIENT
Start: 2025-02-14

## 2025-02-14 NOTE — PROGRESS NOTES
Subjective:     Patient ID: Patel Lechuga is a 54 year old male.    Patient comes in today for follow-up after he was admitted in the hospital again with acute pancreatitis abdominal pain seen by surgery GI recommendation was to have the gallbladder removed feeling better today but still in pain was discharged on [place] from the hospital says he needs refill on that I explained to patient that I am only going to give her 15 tablets and he is not going to get refill on this is not good for him to take this medication long-term specially with history of drug abuse patient agrees he is scheduled for surgery in March will come in for preop few weeks before        History/Other:   Review of Systems   Constitutional: Negative.  Negative for fatigue and fever.   HENT: Negative.  Negative for congestion.    Eyes: Negative.    Respiratory: Negative.  Negative for cough, shortness of breath and wheezing.    Cardiovascular: Negative.  Negative for chest pain, palpitations and leg swelling.   Gastrointestinal:  Positive for abdominal pain.        Improved but still with pain   Endocrine: Negative for cold intolerance and heat intolerance.   Genitourinary: Negative.  Negative for dysuria, flank pain and hematuria.   Musculoskeletal: Negative.  Negative for arthralgias, back pain and myalgias.   Skin: Negative.    Neurological: Negative.  Negative for dizziness, tremors, syncope, weakness and headaches.   Psychiatric/Behavioral: Negative.  Negative for agitation, behavioral problems and suicidal ideas. The patient is not nervous/anxious.      Current Outpatient Medications   Medication Sig Dispense Refill    HYDROcodone-acetaminophen 7.5-325 MG Oral Tab Take 1 tablet by mouth 2 (two) times daily as needed for Pain. 15 tablet 0    Naloxone HCl 4 MG/0.1ML Nasal Liquid 4 mg by Nasal route as needed. If patient remains unresponsive, repeat dose in other nostril 2-5 minutes after first dose. 1 kit 0    PEG 3350-KCl-Na Bicarb-NaCl  (TRILYTE) 420 g Oral Recon Soln Take prep as directed by gastro office. May substitute with Trilyte/generic equivalent if needed. 4000 mL 0    clobetasol 0.05 % External Ointment Apply twice daily  Monday-Friday. Take weekends off. Use on hands and elbows. 60 g 5    Risankizumab-rzaa (SKYRIZI PEN) 150 MG/ML Subcutaneous Solution Auto-injector Inject 150 mg into the skin every 3 (three) months. 1 mL 3    metFORMIN HCl 1000 MG Oral Tab Take 1 tablet (1,000 mg total) by mouth 2 (two) times daily with meals. 180 tablet 1    folic acid 1 MG Oral Tab Take 1 tablet (1 mg total) by mouth daily. 90 tablet 1    Omeprazole 40 MG Oral Capsule Delayed Release Take 1 capsule (40 mg total) by mouth daily. 90 capsule 3    glipiZIDE ER 10 MG Oral Tablet 24 Hr Take 1 tablet (10 mg total) by mouth daily with breakfast. 90 tablet 1     Allergies:Allergies[1]    Past Medical History:    Depression    Diabetes (HCC)    Eczema    Hyperlipidemia    Liver disease    Pancreatitis (HCC)    Psoriasis      Past Surgical History:   Procedure Laterality Date    Colonoscopy screening - referral N/A 07/25/2022    Procedure: COLONOSCOPY-SCREENING/ ESOPHAGOGASTRODUODENOSCOPY (EGD)/ENDOSCOPIC ULTRASOUND with popssible Fine needle aspiration;  Surgeon: Eliezer Roth MD;  Location: [facility] ENDOSCOPY    Hernia surgery        Family History   Problem Relation Age of Onset    Diabetes Mother     Cancer Sister         breast cancer    No Known Problems Brother     No Known Problems Brother     No Known Problems Brother       Social History:   Social History     Socioeconomic History    Marital status: Single   Tobacco Use    Smoking status: Former     Types: Cigarettes     Start date: 2/4/2025     Passive exposure: Current    Smokeless tobacco: Never    Tobacco comments:     3-4 a day    Vaping Use    Vaping status: Every Day    Last attempt to quit: 12/8/2024    Substances: Nicotine    Devices: Disposable   Substance and Sexual Activity    Alcohol use:  Not Currently     Comment: drank 12 beers Sunday night (7-). He also reported having 5 beers last night and also used cocaine. He was previously sober for 22 months.    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 relapsed sunday used crack cocaine on 7-18-24  ( $50.00 of cocaine)   Other Topics Concern    Grew up on a farm No    History of tanning No    Outdoor occupation No    Reaction to local anesthetic No    Caffeine Concern Yes    Exercise No    Pt has a pacemaker No    Pt has a defibrillator No   Social History Narrative    The patient does not use an assistive device..      The patient does live in a home with stairs.     Social Drivers of Health     Food Insecurity: No Food Insecurity (2/10/2025)    NCSS - Food Insecurity     Worried About Running Out of Food in the Last Year: No     Ran Out of Food in the Last Year: No   Transportation Needs: No Transportation Needs (2/10/2025)    NCSS - Transportation     Lack of Transportation: No   Housing Stability: Not At Risk (2/10/2025)    NCSS - Housing/Utilities     Has Housing: Yes     Worried About Losing Housing: No     Unable to Get Utilities: No        Objective:   Physical Exam  Vitals and nursing note reviewed.   Constitutional:       Appearance: He is well-developed.   HENT:      Head: Normocephalic and atraumatic.      Right Ear: External ear normal.      Left Ear: External ear normal.      Nose: Nose normal.   Eyes:      Conjunctiva/sclera: Conjunctivae normal.      Pupils: Pupils are equal, round, and reactive to light.   Cardiovascular:      Rate and Rhythm: Normal rate and regular rhythm.      Heart sounds: Normal heart sounds.   Pulmonary:      Effort: Pulmonary effort is normal.      Breath sounds: Normal breath sounds.   Abdominal:      General: Bowel sounds are normal.      Palpations: Abdomen is soft.      Tenderness: There is abdominal tenderness.      Comments: Overall improved since  discharge as per patient   Genitourinary:     Penis: Normal.       Prostate: Normal.      Rectum: Normal.   Musculoskeletal:         General: Normal range of motion.      Cervical back: Normal range of motion and neck supple.   Skin:     General: Skin is warm and dry.   Neurological:      Mental Status: He is alert and oriented to person, place, and time.      Deep Tendon Reflexes: Reflexes are normal and symmetric.         Assessment & Plan:   1. Hospital discharge follow-up doing better overall still with some pain seen general surgery scheduled for gallbladder removal in March   2. Chronic pancreatitis, unspecified pancreatitis type (HCC) recurrent possible alcohol induced but patient says he stops drinking since last time seen which was followed 6 weeks ago, will refill the pain medication for 15 tablets only patient aware that no refill of this medication going forward   3. Primary hypertension continue current treatment   4. Type 2 diabetes mellitus with hyperosmolarity without coma, without long-term current use of insulin (HCC) continue current treatment of for now follow-up with endocrine since with liver fibrosis pancreatitis might need medication change or adjusted   =    6. 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      Meds This Visit:  Requested Prescriptions     Signed Prescriptions Disp Refills    HYDROcodone-acetaminophen 7.5-325 MG Oral Tab 15 tablet 0     Sig: Take 1 tablet by mouth 2 (two) times daily as needed for Pain.       Imaging & Referrals:  ENDOCRINOLOGY - INTERNAL            [1]   Allergies  Allergen Reactions    Cat Hair Extract ITCHING    Dander ITCHING      Cat dander: Watery eyes, sneezing

## 2025-02-16 ENCOUNTER — HOSPITAL ENCOUNTER (OUTPATIENT)
Dept: MRI IMAGING | Facility: HOSPITAL | Age: 55
Discharge: HOME | End: 2025-02-16
Attending: SURGERY
Payer: MEDICAID

## 2025-02-16 ENCOUNTER — HOSPITAL ENCOUNTER (OUTPATIENT)
Dept: MRI IMAGING | Facility: HOSPITAL | Age: 55
End: 2025-02-16
Attending: SURGERY
Payer: MEDICAID

## 2025-02-16 DIAGNOSIS — K85.00 IDIOPATHIC ACUTE PANCREATITIS, UNSPECIFIED COMPLICATION STATUS (HCC): ICD-10-CM

## 2025-02-16 PROCEDURE — 74183 MRI ABD W/O CNTR FLWD CNTR: CPT | Performed by: SURGERY

## 2025-02-16 PROCEDURE — A9575 INJ GADOTERATE MEGLUMI 0.1ML: HCPCS | Performed by: SURGERY

## 2025-02-16 RX ORDER — GADOTERATE MEGLUMINE 376.9 MG/ML
20 INJECTION INTRAVENOUS
Status: COMPLETED | OUTPATIENT
Start: 2025-02-16 | End: 2025-02-16

## 2025-02-16 RX ADMIN — GADOTERATE MEGLUMINE 19 ML: 376.9 INJECTION INTRAVENOUS at 14:24:00

## 2025-02-19 ENCOUNTER — TELEMEDICINE (OUTPATIENT)
Dept: INTERNAL MEDICINE CLINIC | Facility: CLINIC | Age: 55
End: 2025-02-19
Payer: MEDICAID

## 2025-02-19 DIAGNOSIS — G47.00 INSOMNIA, UNSPECIFIED TYPE: Primary | ICD-10-CM

## 2025-02-19 DIAGNOSIS — K86.1 CHRONIC PANCREATITIS, UNSPECIFIED PANCREATITIS TYPE (HCC): ICD-10-CM

## 2025-02-19 PROCEDURE — 99213 OFFICE O/P EST LOW 20 MIN: CPT | Performed by: INTERNAL MEDICINE

## 2025-02-19 RX ORDER — TRAZODONE HYDROCHLORIDE 100 MG/1
100 TABLET ORAL NIGHTLY
Qty: 30 TABLET | Refills: 2 | Status: SHIPPED | OUTPATIENT
Start: 2025-02-19

## 2025-02-19 NOTE — PROGRESS NOTES
Subjective:     Patient ID: Patel Lechuga is a 54 year old male.    Patient seen today through live two-way video visit says last time he was in office he forgot to mention that he needs refill on his trazodone which helps him with his sleep was given during the visit rehab facility otherwise doing okay but still getting pain in abdomen when he tries to eat heavy meals explained to patient that he needs to watch his diet eat light food more frequent than big heavy meals        History/Other:   Review of Systems   Constitutional: Negative.  Negative for fatigue and fever.   HENT: Negative.  Negative for congestion.    Eyes: Negative.    Respiratory: Negative.  Negative for cough, shortness of breath and wheezing.    Cardiovascular: Negative.  Negative for chest pain, palpitations and leg swelling.   Gastrointestinal:  Positive for abdominal pain.        Improved but still with heavy meals    Endocrine: Negative for cold intolerance and heat intolerance.   Genitourinary: Negative.  Negative for dysuria, flank pain and hematuria.   Musculoskeletal: Negative.  Negative for arthralgias, back pain and myalgias.   Skin: Negative.    Neurological: Negative.  Negative for dizziness, tremors, syncope, weakness and headaches.   Psychiatric/Behavioral: Negative.  Negative for agitation, behavioral problems and suicidal ideas. The patient is not nervous/anxious.      Current Outpatient Medications   Medication Sig Dispense Refill    traZODone 100 MG Oral Tab Take 1 tablet (100 mg total) by mouth nightly. 30 tablet 2    HYDROcodone-acetaminophen 7.5-325 MG Oral Tab Take 1 tablet by mouth 2 (two) times daily as needed for Pain. 15 tablet 0    Naloxone HCl 4 MG/0.1ML Nasal Liquid 4 mg by Nasal route as needed. If patient remains unresponsive, repeat dose in other nostril 2-5 minutes after first dose. 1 kit 0    PEG 3350-KCl-Na Bicarb-NaCl (TRILYTE) 420 g Oral Recon Soln Take prep as directed by gastro office. May substitute with  Trilyte/generic equivalent if needed. 4000 mL 0    clobetasol 0.05 % External Ointment Apply twice daily  Monday-Friday. Take weekends off. Use on hands and elbows. 60 g 5    Risankizumab-rzaa (SKYRIZI PEN) 150 MG/ML Subcutaneous Solution Auto-injector Inject 150 mg into the skin every 3 (three) months. 1 mL 3    metFORMIN HCl 1000 MG Oral Tab Take 1 tablet (1,000 mg total) by mouth 2 (two) times daily with meals. 180 tablet 1    folic acid 1 MG Oral Tab Take 1 tablet (1 mg total) by mouth daily. 90 tablet 1    Omeprazole 40 MG Oral Capsule Delayed Release Take 1 capsule (40 mg total) by mouth daily. 90 capsule 3    glipiZIDE ER 10 MG Oral Tablet 24 Hr Take 1 tablet (10 mg total) by mouth daily with breakfast. 90 tablet 1     Allergies:Allergies[1]    Past Medical History:    Depression    Diabetes (HCC)    Eczema    Hyperlipidemia    Liver disease    Pancreatitis (HCC)    Psoriasis      Past Surgical History:   Procedure Laterality Date    Colonoscopy screening - referral N/A 07/25/2022    Procedure: COLONOSCOPY-SCREENING/ ESOPHAGOGASTRODUODENOSCOPY (EGD)/ENDOSCOPIC ULTRASOUND with popssible Fine needle aspiration;  Surgeon: Eliezer Roth MD;  Location: [facility] ENDOSCOPY    Hernia surgery        Family History   Problem Relation Age of Onset    Diabetes Mother     Cancer Sister         breast cancer    No Known Problems Brother     No Known Problems Brother     No Known Problems Brother       Social History:   Social History     Socioeconomic History    Marital status: Single   Tobacco Use    Smoking status: Former     Types: Cigarettes     Start date: 2/4/2025     Passive exposure: Current    Smokeless tobacco: Never    Tobacco comments:     3-4 a day    Vaping Use    Vaping status: Every Day    Last attempt to quit: 12/8/2024    Substances: Nicotine    Devices: Disposable   Substance and Sexual Activity    Alcohol use: Not Currently     Comment: drank 12 beers Sunday night (7-). He also reported having 5  beers last night and also used cocaine. He was previously sober for 22 months.    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 relapsed sunday used crack cocaine on 7-18-24  ( $50.00 of cocaine)   Other Topics Concern    Grew up on a farm No    History of tanning No    Outdoor occupation No    Reaction to local anesthetic No    Caffeine Concern Yes    Exercise No    Pt has a pacemaker No    Pt has a defibrillator No   Social History Narrative    The patient does not use an assistive device..      The patient does live in a home with stairs.     Social Drivers of Health     Food Insecurity: No Food Insecurity (2/10/2025)    NCSS - Food Insecurity     Worried About Running Out of Food in the Last Year: No     Ran Out of Food in the Last Year: No   Transportation Needs: No Transportation Needs (2/10/2025)    NCSS - Transportation     Lack of Transportation: No   Housing Stability: Not At Risk (2/10/2025)    NCSS - Housing/Utilities     Has Housing: Yes     Worried About Losing Housing: No     Unable to Get Utilities: No        Objective:   Physical Exam  Constitutional:       Appearance: He is not ill-appearing.   Pulmonary:      Effort: No respiratory distress.   Neurological:      Mental Status: He is oriented to person, place, and time.         Assessment & Plan:   1. Insomnia, unspecified type will refill trazodone take as prescribed    2. Chronic pancreatitis, unspecified pancreatitis type (HCC) monitor symptoms lighter meals patient scheduled to have a cholecystectomy done   21 min spent     No orders of the defined types were placed in this encounter.      Meds This Visit:  Requested Prescriptions     Signed Prescriptions Disp Refills    traZODone 100 MG Oral Tab 30 tablet 2     Sig: Take 1 tablet (100 mg total) by mouth nightly.       Imaging & Referrals:  None            [1]   Allergies  Allergen Reactions    Cat Hair Extract ITCHING    Dander ITCHING       Cat dander: Watery eyes, sneezing

## 2025-02-20 ENCOUNTER — APPOINTMENT (OUTPATIENT)
Dept: ENDOCRINOLOGY | Facility: HOSPITAL | Age: 55
End: 2025-02-20
Attending: INTERNAL MEDICINE
Payer: MEDICAID

## 2025-03-05 ENCOUNTER — APPOINTMENT (OUTPATIENT)
Dept: ENDOCRINOLOGY | Facility: HOSPITAL | Age: 55
End: 2025-03-05
Attending: INTERNAL MEDICINE

## 2025-03-12 ENCOUNTER — APPOINTMENT (OUTPATIENT)
Dept: ENDOCRINOLOGY | Facility: HOSPITAL | Age: 55
End: 2025-03-12
Attending: INTERNAL MEDICINE

## 2025-03-13 ENCOUNTER — OFFICE VISIT (OUTPATIENT)
Dept: INTERNAL MEDICINE CLINIC | Facility: CLINIC | Age: 55
End: 2025-03-13
Payer: MEDICAID

## 2025-03-13 VITALS
SYSTOLIC BLOOD PRESSURE: 128 MMHG | BODY MASS INDEX: 29.06 KG/M2 | TEMPERATURE: 98 F | WEIGHT: 203 LBS | OXYGEN SATURATION: 99 % | HEART RATE: 72 BPM | HEIGHT: 70 IN | DIASTOLIC BLOOD PRESSURE: 72 MMHG | RESPIRATION RATE: 17 BRPM

## 2025-03-13 DIAGNOSIS — R06.02 SOB (SHORTNESS OF BREATH) ON EXERTION: ICD-10-CM

## 2025-03-13 DIAGNOSIS — Z01.818 PRE-OP EXAM: Primary | ICD-10-CM

## 2025-03-13 PROCEDURE — 99214 OFFICE O/P EST MOD 30 MIN: CPT | Performed by: INTERNAL MEDICINE

## 2025-03-13 NOTE — PROGRESS NOTES
Subjective:     Patient ID: Patel Lechuga is a 54 year old male.    HPI  :  *Patient comes in today for preop clearance for gallbladder surgery at the end of the month patient does say that he gets short of breath with exertion this been going on for few months now    History/Other:   Review of Systems   Constitutional: Negative.  Negative for fatigue and fever.   HENT: Negative.  Negative for congestion.    Eyes: Negative.    Respiratory:  Positive for shortness of breath. Negative for cough and wheezing.    Cardiovascular: Negative.  Negative for chest pain, palpitations and leg swelling.   Gastrointestinal: Negative.    Endocrine: Negative for cold intolerance and heat intolerance.   Genitourinary: Negative.  Negative for dysuria, flank pain and hematuria.   Musculoskeletal: Negative.  Negative for arthralgias, back pain and myalgias.   Skin: Negative.    Neurological: Negative.  Negative for dizziness, tremors, syncope, weakness and headaches.   Psychiatric/Behavioral: Negative.  Negative for agitation, behavioral problems and suicidal ideas. The patient is not nervous/anxious.      Current Outpatient Medications   Medication Sig Dispense Refill    traZODone 100 MG Oral Tab Take 1 tablet (100 mg total) by mouth nightly. 30 tablet 2    Naloxone HCl 4 MG/0.1ML Nasal Liquid 4 mg by Nasal route as needed. If patient remains unresponsive, repeat dose in other nostril 2-5 minutes after first dose. 1 kit 0    PEG 3350-KCl-Na Bicarb-NaCl (TRILYTE) 420 g Oral Recon Soln Take prep as directed by gastro office. May substitute with Trilyte/generic equivalent if needed. 4000 mL 0    clobetasol 0.05 % External Ointment Apply twice daily  Monday-Friday. Take weekends off. Use on hands and elbows. 60 g 5    Risankizumab-rzaa (SKYRIZI PEN) 150 MG/ML Subcutaneous Solution Auto-injector Inject 150 mg into the skin every 3 (three) months. 1 mL 3    metFORMIN HCl 1000 MG Oral Tab Take 1 tablet (1,000 mg total) by mouth 2 (two)  times daily with meals. 180 tablet 1    folic acid 1 MG Oral Tab Take 1 tablet (1 mg total) by mouth daily. 90 tablet 1    Omeprazole 40 MG Oral Capsule Delayed Release Take 1 capsule (40 mg total) by mouth daily. 90 capsule 3    glipiZIDE ER 10 MG Oral Tablet 24 Hr Take 1 tablet (10 mg total) by mouth daily with breakfast. 90 tablet 1    HYDROcodone-acetaminophen 7.5-325 MG Oral Tab Take 1 tablet by mouth 2 (two) times daily as needed for Pain. 15 tablet 0     Allergies:Allergies[1]    Past Medical History:    Depression    Diabetes (HCC)    Eczema    Hyperlipidemia    Liver disease    Pancreatitis (HCC)    Psoriasis      Past Surgical History:   Procedure Laterality Date    Colonoscopy screening - referral N/A 07/25/2022    Procedure: COLONOSCOPY-SCREENING/ ESOPHAGOGASTRODUODENOSCOPY (EGD)/ENDOSCOPIC ULTRASOUND with popssible Fine needle aspiration;  Surgeon: Eliezer Roth MD;  Location: [facility] ENDOSCOPY    Hernia surgery        Family History   Problem Relation Age of Onset    Diabetes Mother     Cancer Sister         breast cancer    No Known Problems Brother     No Known Problems Brother     No Known Problems Brother     Cancer Sister       Social History:   Social History     Socioeconomic History    Marital status: Single   Tobacco Use    Smoking status: Former     Types: Cigarettes     Start date: 2/4/2025     Passive exposure: Current    Smokeless tobacco: Never    Tobacco comments:     3-4 a day    Vaping Use    Vaping status: Every Day    Last attempt to quit: 12/8/2024    Substances: Nicotine    Devices: Disposable   Substance and Sexual Activity    Alcohol use: Not Currently     Comment: drank 12 beers Sunday night (7-). He also reported having 5 beers last night and also used cocaine. He was previously sober for 22 months.    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 relapsed sunday used crack cocaine on 7-18-24  ( $50.00 of cocaine)    Other Topics Concern    [place]rew up on a farm No    History of tanning No    Outdoor occupation No    Reaction to local anesthetic No    Caffeine Concern Yes    Exercise No    Pt has a pacemaker No    Pt has a defibrillator No   Social History Narrative    The patient does not use an assistive device..      The patient does live in a home with stairs.     Social Drivers of Health     Food Insecurity: No Food Insecurity (2/10/2025)    NCSS - Food Insecurity     Worried About Running Out of Food in the Last Year: No     Ran Out of Food in the Last Year: No   Transportation Needs: No Transportation Needs (2/10/2025)    NCSS - Transportation     Lack of Transportation: No   Housing Stability: Not At Risk (2/10/2025)    NCSS - Housing/Utilities     Has Housing: Yes     Worried About Losing Housing: No     Unable to Get Utilities: No        Objective:   Physical Exam  Vitals and nursing note reviewed.   Constitutional:       Appearance: He is well-developed.   HENT:      Head: Normocephalic and atraumatic.      Right Ear: External ear normal.      Left Ear: External ear normal.      Nose: Nose normal.   Eyes:      Conjunctiva/sclera: Conjunctivae normal.      Pupils: Pupils are equal, round, and reactive to light.   Cardiovascular:      Rate and Rhythm: Normal rate and regular rhythm.      Heart sounds: Normal heart sounds.   Pulmonary:      Effort: Pulmonary effort is normal.      Breath sounds: Normal breath sounds.   Abdominal:      General: Bowel sounds are normal.      Palpations: Abdomen is soft.   Genitourinary:     Penis: Normal.       Prostate: Normal.      Rectum: Normal.   Musculoskeletal:         General: Normal range of motion.      Cervical back: Normal range of motion and neck supple.   Skin:     General: Skin is warm and dry.   Neurological:      Mental Status: He is alert and oriented to person, place, and time.      Deep Tendon Reflexes: Reflexes are normal and symmetric.         Assessment & Plan:   1.  Pre-op exam for EKG and labs also will order stress test due to symptoms of shortness of breath with exertion as above we will follow results   2. SOB (shortness of breath) as above   Addendum stress EKG and labs are okay patient cleared for surgery with low risk    Orders Placed This Encounter   Procedures    CBC With Differential With Platelet    Basic Metabolic Panel (8) [E]       Meds This Visit:  Requested Prescriptions      No prescriptions requested or ordered in this encounter       Imaging & Referrals:  CARD TREADMILL STRESS, ADULT (CPT=93017)  EKG 12-LEAD            [1]   Allergies  Allergen Reactions    Cat Hair Extract ITCHING    Dander ITCHING      Cat dander: Watery eyes, sneezing

## 2025-03-14 ENCOUNTER — HOSPITAL ENCOUNTER (OUTPATIENT)
Dept: CV DIAGNOSTICS | Facility: HOSPITAL | Age: 55
Discharge: HOME | End: 2025-03-14
Attending: INTERNAL MEDICINE
Payer: MEDICAID

## 2025-03-14 ENCOUNTER — LAB ENCOUNTER (OUTPATIENT)
Dept: LAB | Facility: HOSPITAL | Age: 55
End: 2025-03-14
Attending: INTERNAL MEDICINE
Payer: MEDICAID

## 2025-03-14 DIAGNOSIS — R06.02 SOB (SHORTNESS OF BREATH) ON EXERTION: ICD-10-CM

## 2025-03-14 DIAGNOSIS — Z01.818 PRE-OP EXAM: ICD-10-CM

## 2025-03-14 LAB
% OF MAX PREDICTED HR: 100 %
ANION GAP SERPL CALC-SCNC: 6 MMOL/L (ref 0–18)
ATRIAL RATE: 77 BPM
BASOPHILS # BLD AUTO: 0.05 X10(3) UL (ref 0–0.2)
BASOPHILS NFR BLD AUTO: 0.8 %
BUN BLD-MCNC: 10 MG/DL (ref 9–23)
BUN/CREAT SERPL: 9 (ref 10–20)
CALCIUM BLD-MCNC: 9.4 MG/DL (ref 8.7–10.4)
CHLORIDE SERPL-SCNC: 102 MMOL/L (ref 98–112)
CO2 SERPL-SCNC: 27 MMOL/L (ref 21–32)
CREAT BLD-MCNC: 1.11 MG/DL
DEPRECATED RDW RBC AUTO: 35.4 FL (ref 35.1–46.3)
EGFRCR SERPLBLD CKD-EPI 2021: 80 ML/MIN/1.73M2 (ref 60–?)
EOSINOPHIL # BLD AUTO: 0.15 X10(3) UL (ref 0–0.7)
EOSINOPHIL NFR BLD AUTO: 2.3 %
ERYTHROCYTE [DISTWIDTH] IN BLOOD BY AUTOMATED COUNT: 11.4 % (ref 11–15)
FASTING STATUS PATIENT QL REPORTED: NO
GLUCOSE BLD-MCNC: 269 MG/DL (ref 70–99)
HCT VFR BLD AUTO: 41.7 %
HGB BLD-MCNC: 14.9 G/DL
IMM GRANULOCYTES # BLD AUTO: 0.02 X10(3) UL (ref 0–1)
IMM GRANULOCYTES NFR BLD: 0.3 %
LYMPHOCYTES # BLD AUTO: 2.4 X10(3) UL (ref 1–4)
LYMPHOCYTES NFR BLD AUTO: 36.6 %
MAX DIASTOLIC BP: 76 MMHG
MAX HEART RATE: 146 BPM
MAX PREDICTED HEART RATE: 168 BPM
MAX SYSTOLIC BP: 174 MMHG
MAX WORK LOAD: 132
MCH RBC QN AUTO: 30.5 PG (ref 26–34)
MCHC RBC AUTO-ENTMCNC: 35.7 G/DL (ref 31–37)
MCV RBC AUTO: 85.3 FL
MONOCYTES # BLD AUTO: 0.51 X10(3) UL (ref 0.1–1)
MONOCYTES NFR BLD AUTO: 7.8 %
NEUTROPHILS # BLD AUTO: 3.43 X10 (3) UL (ref 1.5–7.7)
NEUTROPHILS # BLD AUTO: 3.43 X10(3) UL (ref 1.5–7.7)
NEUTROPHILS NFR BLD AUTO: 52.2 %
OSMOLALITY SERPL CALC.SUM OF ELEC: 289 MOSM/KG (ref 275–295)
P AXIS: 39 DEGREES
P-R INTERVAL: 118 MS
PLATELET # BLD AUTO: 225 10(3)UL (ref 150–450)
POTASSIUM SERPL-SCNC: 4.7 MMOL/L (ref 3.5–5.1)
Q-T INTERVAL: 374 MS
QRS DURATION: 100 MS
QTC CALCULATION (BEZET): 423 MS
R AXIS: 3 DEGREES
RBC # BLD AUTO: 4.89 X10(6)UL
SODIUM SERPL-SCNC: 135 MMOL/L (ref 136–145)
T AXIS: 6 DEGREES
VENTRICULAR RATE: 77 BPM
WBC # BLD AUTO: 6.6 X10(3) UL (ref 4–11)

## 2025-03-14 PROCEDURE — 93010 ELECTROCARDIOGRAM REPORT: CPT | Performed by: STUDENT IN AN ORGANIZED HEALTH CARE EDUCATION/TRAINING PROGRAM

## 2025-03-14 PROCEDURE — 85025 COMPLETE CBC W/AUTO DIFF WBC: CPT

## 2025-03-14 PROCEDURE — 80048 BASIC METABOLIC PNL TOTAL CA: CPT

## 2025-03-14 PROCEDURE — 93018 CV STRESS TEST I&R ONLY: CPT | Performed by: STUDENT IN AN ORGANIZED HEALTH CARE EDUCATION/TRAINING PROGRAM

## 2025-03-14 PROCEDURE — 93016 CV STRESS TEST SUPVJ ONLY: CPT | Performed by: STUDENT IN AN ORGANIZED HEALTH CARE EDUCATION/TRAINING PROGRAM

## 2025-03-14 PROCEDURE — 36415 COLL VENOUS BLD VENIPUNCTURE: CPT

## 2025-03-14 PROCEDURE — 93017 CV STRESS TEST TRACING ONLY: CPT | Performed by: INTERNAL MEDICINE

## 2025-03-14 PROCEDURE — 93005 ELECTROCARDIOGRAM TRACING: CPT

## 2025-03-19 ENCOUNTER — APPOINTMENT (OUTPATIENT)
Dept: ENDOCRINOLOGY | Facility: HOSPITAL | Age: 55
End: 2025-03-19
Attending: INTERNAL MEDICINE

## 2025-03-24 NOTE — DISCHARGE INSTRUCTIONS
HOME INSTRUCTIONS    .mklpt  Keep steri strips on, change gauze if saturated as needed, may leave gauze and tegaderm dressing on as long as it is dry and comfortable.  Low fat small meals, avoid dairy for 1-2 weeks  Tylenol and Ibuprofen should be adequate for pain  No lifting > 10 lbs  No driving  See Tim Dickinson or Clara STALLINGS  in approximately 2 wk  May use ice pack on incision prn  Shower starting tomorrow, no submersion in bath/hot tub/pool   Call with any concerns    ____________________________________________________   [facility] HOME CARE INSTRUCTIONS: POST-OP ANESTHESIA  The medication that you received for sedation or general anesthesia can last up to 24 hours. Your judgment and reflexes may be altered, even if you feel like your normal self.      We Recommend:   Do not drive any motor vehicle or bicycle   Avoid mowing the lawn, playing sports, or working with power tools/applicances (power saws, electric knives or mixers)   That you have someone stay with you on your first night home   Do not drink alcohol or take sleeping pills or tranquilizers   Do not sign legal documents within 24 hours of your procedure   If you had a nerve block for your surgery, take extra care not to put any pressure on your arm or hand for 24 hours    It is normal:  For you to have a sore throat if you had a breathing tube during surgery (while you were asleep!). The sore throat should get better within 48 hours. You can gargle with warm salt water (1/2 tsp in 4 oz warm water) or use a throat lozenge for comfort  To feel muscle aches or soreness especially in the abdomen, chest or neck. The achy feeling should go away in the next 24 hours  To feel weak, sleepy or \"wiped out\". Your should start feeling better in the next 24 hours.   To experience mild discomforts such as sore lip or tongue, headache, cramps, gas pains or a bloated feeling in your abdomen.   To experience mild back pain or soreness for a day or two if you had  spinal or epidural anesthesia.   If you had laparoscopic surgery, to feel shoulder pain or discomfort on the day of surgery.   For some patients to have nausea after surgery/anesthesia    If you feel nausea or experience vomiting:   Try to move around less.   Eat less than usual or drink only liquids until the next morning   Nausea should resolve in about 24 hours    If you have a problem when you are at home:    Call your surgeons office   Discharge Instructions: After Your Surgery  You’ve just had surgery. During surgery, you were given medicine called anesthesia to keep you relaxed and free of pain. After surgery, you may have some pain or nausea. This is common. Here are some tips for feeling better and getting well after surgery.   Going home  Your healthcare provider will show you how to take care of yourself when you go home. They'll also answer your questions. Have an adult family member or friend drive you home. For the first 24 hours after your surgery:   Don't drive or use heavy equipment.  Don't make important decisions or sign legal papers.  Take medicines as directed.  Don't drink alcohol.  Have someone stay with you, if needed. They can watch for problems and help keep you safe.  Be sure to go to all follow-up visits with your healthcare provider. And rest after your surgery for as long as your provider tells you to.   Coping with pain  If you have pain after surgery, pain medicine will help you feel better. Take it as directed, before pain becomes severe. Also, ask your healthcare provider or pharmacist about other ways to control pain. This might be with heat, ice, or relaxation. And follow any other instructions your surgeon or nurse gives you.      Stay on schedule with your medicine.     Tips for taking pain medicine  To get the best relief possible, remember these points:   Pain medicines can upset your stomach. Taking them with a little food may help.  Most pain relievers taken by mouth need at  least 20 to 30 minutes to start to work.  Don't wait till your pain becomes severe before you take your medicine. Try to time your medicine so that you can take it before starting an activity. This might be before you get dressed, go for a walk, or sit down for dinner.  Constipation is a common side effect of some pain medicines. Call your healthcare provider before taking any medicines such as laxatives or stool softeners to help ease constipation. Also ask if you should skip any foods. Drinking lots of fluids and eating foods such as fruits and vegetables that are high in fiber can also help. Remember, don't take laxatives unless your surgeon has prescribed them.  Drinking alcohol and taking pain medicine can cause dizziness and slow your breathing. It can even be deadly. Don't drink alcohol while taking pain medicine.  Pain medicine can make you react more slowly to things. Don't drive or run machinery while taking pain medicine.  Your healthcare provider may tell you to take acetaminophen to help ease your pain. Ask them how much you're supposed to take each day. Acetaminophen or other pain relievers may interact with your prescription medicines or other over-the-counter (OTC) medicines. Some prescription medicines have acetaminophen and other ingredients in them. Using both prescription and OTC acetaminophen for pain can cause you to accidentally overdose. Read the labels on your OTC medicines with care. This will help you to clearly know the list of ingredients, how much to take, and any warnings. It may also help you not take too much acetaminophen. If you have questions or don't understand the information, ask your pharmacist or healthcare provider to explain it to you before you take the OTC medicine.   Managing nausea  Some people have an upset stomach (nausea) after surgery. This is often because of anesthesia, pain, or pain medicine, less movement of food in the stomach, or the stress of surgery. These  tips will help you handle nausea and eat healthy foods as you get better. If you were on a special food plan before surgery, ask your healthcare provider if you should follow it while you get better. Check with your provider on how your eating should progress. It may depend on the surgery you had. These general tips may help:   Don't push yourself to eat. Your body will tell you when to eat and how much.  Start off with clear liquids and soup. They're easier to digest.  Next try semi-solid foods as you feel ready. These include mashed potatoes, applesauce, and gelatin.  Slowly move to solid foods. Don’t eat fatty, rich, or spicy foods at first.  Don't force yourself to have 3 large meals a day. Instead eat smaller amounts more often.  Take pain medicines with a small amount of solid food, such as crackers or toast. This helps prevent nausea.  When to call your healthcare provider  Call your healthcare provider right away if you have any of these:   You still have too much pain, or the pain gets worse, after taking the medicine. The medicine may not be strong enough. Or there may be a complication from the surgery.  You feel too sleepy, dizzy, or groggy. The medicine may be too strong.  Side effects such as nausea or vomiting. Your healthcare provider may advise taking other medicines to .  Skin changes such as rash, itching, or hives. This may mean you have an allergic reaction. Your provider may advise taking other medicines.  The incision looks different (for instance, part of it opens up).  Bleeding or fluid leaking from the incision site, and weren't told to expect that.  Fever of 100.4°F (38°C) or higher, or as directed by your provider.  Call 911  Call 911 right away if you have:   Trouble breathing  Facial swelling    If you have obstructive sleep apnea   You were given anesthesia medicine during surgery to keep you comfortable and free of pain. After surgery, you may have more apnea spells because of this  medicine and other medicines you were given. The spells may last longer than normal.    At home:  Keep using the continuous positive airway pressure (CPAP) device when you sleep. Unless your healthcare provider tells you not to, use it when you sleep, day or night. CPAP is a common device used to treat obstructive sleep apnea.  Talk with your provider before taking any pain medicine, muscle relaxants, or sedatives. Your provider will tell you about the possible dangers of taking these medicines.  Contact your provider if your sleeping changes a lot even when taking medicines as directed.  [employer] last reviewed this educational content on 10/1/2021  © 0497-8908 [employer]. All rights reserved. This information is not intended as a substitute for professional medical care. Always follow your healthcare professional's instructions.

## 2025-03-25 ENCOUNTER — HOSPITAL ENCOUNTER (OUTPATIENT)
Facility: HOSPITAL | Age: 55
Setting detail: HOSPITAL OUTPATIENT SURGERY
Discharge: HOME | End: 2025-03-25
Attending: SURGERY | Admitting: SURGERY
Payer: MEDICAID

## 2025-03-25 VITALS
RESPIRATION RATE: 16 BRPM | WEIGHT: 199 LBS | HEART RATE: 72 BPM | TEMPERATURE: 98 F | BODY MASS INDEX: 27.86 KG/M2 | DIASTOLIC BLOOD PRESSURE: 68 MMHG | OXYGEN SATURATION: 97 % | HEIGHT: 71 IN | SYSTOLIC BLOOD PRESSURE: 118 MMHG

## 2025-03-25 LAB — GLUCOSE BLDC GLUCOMTR-MCNC: 334 MG/DL (ref 70–99)

## 2025-03-25 PROCEDURE — 82962 GLUCOSE BLOOD TEST: CPT

## 2025-03-25 RX ORDER — INSULIN ASPART 100 [IU]/ML
INJECTION, SOLUTION INTRAVENOUS; SUBCUTANEOUS ONCE
Status: COMPLETED | OUTPATIENT
Start: 2025-03-25 | End: 2025-03-25

## 2025-03-25 RX ORDER — DEXTROSE MONOHYDRATE 50 MG/ML
INJECTION, SOLUTION INTRAVENOUS CONTINUOUS
Status: DISCONTINUED | OUTPATIENT
Start: 2025-03-25 | End: 2025-03-25

## 2025-03-25 RX ORDER — SODIUM CHLORIDE, SODIUM LACTATE, POTASSIUM CHLORIDE, CALCIUM CHLORIDE 600; 310; 30; 20 MG/100ML; MG/100ML; MG/100ML; MG/100ML
INJECTION, SOLUTION INTRAVENOUS CONTINUOUS
Status: DISCONTINUED | OUTPATIENT
Start: 2025-03-25 | End: 2025-03-25

## 2025-03-25 RX ORDER — ACETAMINOPHEN 500 MG
1000 TABLET ORAL ONCE
Status: COMPLETED | OUTPATIENT
Start: 2025-03-25 | End: 2025-03-25

## 2025-03-25 NOTE — H&P
Chief complaint: Abd pain      HPI: Patel is a very pleasant diabetic male, recurrent recently was admitted to the hospital for recurrent acute pancreatitis dating back to 2015. Etiology has been not clear, even had EUS 2022 that showed non-specific changes in pancreas. IGG4 previously checked to be normal. He does have intermittent ETOH use but none in the past 6 weeks reported. H/o biliary sludge seen in . Dr. Pham (GI) recommended proceeding with  elective cholecystectomy given otherwise unexplained episodes.   He experienced symptomatic improvement with supportive care. He still has some epigastric pain and back pain  and he feels bloated in the upper abdomen. No J/F/C.     He has a h/o depression with suicidal ideation. He was on medication, however did not  have much result and is now working with a therapist for his depression.     Past medical history:   Past Medical History:    Depression    Diabetes (HCC)    Eczema    Esophageal reflux    Hyperlipidemia    Liver disease    Pancreatitis (HCC)    Psoriasis       Past surgical history:   Past Surgical History:   Procedure Laterality Date    Colonoscopy screening - referral N/A 07/25/2022    Procedure: COLONOSCOPY-SCREENING/ ESOPHAGOGASTRODUODENOSCOPY (EGD)/ENDOSCOPIC ULTRASOUND with popssible Fine needle aspiration;  Surgeon: Eliezer Roth MD;  Location: [facility] ENDOSCOPY    Hernia surgery      double hernia removal when 7 years old       Allergies: Allergies[1]    Medications:   No current outpatient medications on file.       Social history:   Social History     Socioeconomic History    Marital status: Single   Tobacco Use    Smoking status: Former     Types: Cigarettes     Start date: 2/4/2025     Passive exposure: Current    Smokeless tobacco: Never    Tobacco comments:     3-4 a day    Vaping Use    Vaping status: Former    Quit date: 12/8/2024    Substances: Nicotine    Devices: Disposable   Substance and Sexual Activity    Alcohol use: Not  Currently     Comment: drank 12 beers Sunday night (7-). He also reported having 5 beers last night and also used cocaine. He was previously sober for 22 months.    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 relapsed sunday used crack cocaine on 7-18-24  ( $50.00 of cocaine)        Family history:  Family History   Problem Relation Age of Onset    Diabetes Mother     Cancer Sister         breast cancer    No Known Problems Brother     No Known Problems Brother     No Known Problems Brother     Cancer Sister         Review of Systems:   GENERAL: feels generally well  SKIN: no ulcerated or worrisome skin lesions  EYES:denies blurred vision or double vision  HEENT: denies new nasal congestion, sinus pain or ST  LUNGS: denies shortness of breath with exertion  CARDIOVASCULAR: denies chest pain on exertion  GI: no hematemesis, no BRBPR, no worsening heartburn  : no dysuria, no blood in urine, no difficulty urinating  MUSCULOSKELETAL: no new musculoskeletal complaints  NEURO: no persistent, recurrent  headaches  PSYCHE:no depression or anxiety  HEMATOLOGIC: no hx of blood dyscrasia  ENDOCRINE: no new endocrine problems  ALL/ASTHMA: no new hx of severe allergy or asthma  BACK: normal, no spinal deformity, no CVA tenderness    Physical examination:     Constitutional: appears well hydrated alert and responsive no acute distress noted  HEENT wnl, anicteric, PERRL, normocephalic, atraumatic  Neck supple, norm ROM, no JVD  L CTA B  H Reg rate  Abd soft, slt T epigastric and RUQ, ND, no masses, no hernias, no HSM.  Extr no c/c/e  Skin intact, no jaundice, no rashes, no lesions  Neuro grossly intact, no focal deficits, no tremors  Back no deformity, no CVA tnd.         Assessment and plan:  Medical clearance with improvement of glu control.   Labs today.  MRCP pre op.  Plan laparoscopic cholecystectomy, possible open, possible IOC.  We have discussed the surgical  risks, benefits, alternatives, and expected recovery. All of the patient's questions have been answered to his satisfaction.           Shannon Norton MD       [1]   Allergies  Allergen Reactions    Cat Hair Extract ITCHING

## 2025-03-27 ENCOUNTER — OFFICE VISIT (OUTPATIENT)
Dept: INTERNAL MEDICINE CLINIC | Facility: CLINIC | Age: 55
End: 2025-03-27
Payer: MEDICAID

## 2025-03-27 ENCOUNTER — HOSPITAL ENCOUNTER (OUTPATIENT)
Dept: GENERAL RADIOLOGY | Age: 55
Discharge: HOME | End: 2025-03-27
Attending: INTERNAL MEDICINE
Payer: MEDICAID

## 2025-03-27 VITALS
HEART RATE: 75 BPM | WEIGHT: 206 LBS | TEMPERATURE: 99 F | DIASTOLIC BLOOD PRESSURE: 84 MMHG | BODY MASS INDEX: 28.84 KG/M2 | HEIGHT: 71 IN | RESPIRATION RATE: 17 BRPM | OXYGEN SATURATION: 98 % | SYSTOLIC BLOOD PRESSURE: 127 MMHG

## 2025-03-27 DIAGNOSIS — M54.50 CHRONIC BILATERAL LOW BACK PAIN WITHOUT SCIATICA: ICD-10-CM

## 2025-03-27 DIAGNOSIS — E11.00 TYPE 2 DIABETES MELLITUS WITH HYPEROSMOLARITY WITHOUT COMA, WITHOUT LONG-TERM CURRENT USE OF INSULIN (HCC): Primary | ICD-10-CM

## 2025-03-27 DIAGNOSIS — M54.6 CHRONIC BILATERAL THORACIC BACK PAIN: ICD-10-CM

## 2025-03-27 DIAGNOSIS — G89.29 CHRONIC BILATERAL THORACIC BACK PAIN: ICD-10-CM

## 2025-03-27 DIAGNOSIS — G89.29 CHRONIC BILATERAL LOW BACK PAIN WITHOUT SCIATICA: ICD-10-CM

## 2025-03-27 DIAGNOSIS — M54.2 NECK PAIN: ICD-10-CM

## 2025-03-27 PROCEDURE — 72072 X-RAY EXAM THORAC SPINE 3VWS: CPT | Performed by: INTERNAL MEDICINE

## 2025-03-27 PROCEDURE — 36415 COLL VENOUS BLD VENIPUNCTURE: CPT | Performed by: INTERNAL MEDICINE

## 2025-03-27 PROCEDURE — 72050 X-RAY EXAM NECK SPINE 4/5VWS: CPT | Performed by: INTERNAL MEDICINE

## 2025-03-27 PROCEDURE — 72110 X-RAY EXAM L-2 SPINE 4/>VWS: CPT | Performed by: INTERNAL MEDICINE

## 2025-03-27 PROCEDURE — 99214 OFFICE O/P EST MOD 30 MIN: CPT | Performed by: INTERNAL MEDICINE

## 2025-03-27 NOTE — PROGRESS NOTES
Subjective:     Patient ID: Patel Lechuga is a 54 year old male.    HPI  Patient comes in for follow-up gallbladder surgery was canceled because his blood sugars were really elevated before surgery patient said he had been stress eating but peanut butter today before.  Last time we did A1c was 12 he said the numbers otherwise has been okay he is taking medication.  Today also complains of neck thoracic and low back pains been going on for few weeks now he is back at work for a month now standing most of the time.  No injury no falls.    History/Other:   Review of Systems   Constitutional: Negative.    HENT: Negative.     Eyes: Negative.    Respiratory: Negative.     Cardiovascular: Negative.    Gastrointestinal: Negative.    Genitourinary: Negative.    Musculoskeletal:  Positive for back pain and neck pain.   Skin: Negative.    Neurological: Negative.    Psychiatric/Behavioral: Negative.       Current Outpatient Medications   Medication Sig Dispense Refill    traZODone 100 MG Oral Tab Take 1 tablet (100 mg total) by mouth nightly. 30 tablet 2    clobetasol 0.05 % External Ointment Apply twice daily  Monday-Friday. Take weekends off. Use on hands and elbows. 60 g 5    Risankizumab-rzaa (SKYRIZI PEN) 150 MG/ML Subcutaneous Solution Auto-injector Inject 150 mg into the skin every 3 (three) months. 1 mL 3    metFORMIN HCl 1000 MG Oral Tab Take 1 tablet (1,000 mg total) by mouth 2 (two) times daily with meals. 180 tablet 1    folic acid 1 MG Oral Tab Take 1 tablet (1 mg total) by mouth daily. 90 tablet 1    Omeprazole 40 MG Oral Capsule Delayed Release Take 1 capsule (40 mg total) by mouth daily. 90 capsule 3    glipiZIDE ER 10 MG Oral Tablet 24 Hr Take 1 tablet (10 mg total) by mouth daily with breakfast. 90 tablet 1    Naloxone HCl 4 MG/0.1ML Nasal Liquid 4 mg by Nasal route as needed. If patient remains unresponsive, repeat dose in other nostril 2-5 minutes after first dose. (Patient not taking: Reported on  3/27/2025) 1 kit 0    PEG 3350-KCl-Na Bicarb-NaCl (TRILYTE) 420 g Oral Recon Soln Take prep as directed by gastro office. May substitute with Trilyte/generic equivalent if needed. 4000 mL 0     Allergies:Allergies[1]    Past Medical History:    Depression    Diabetes (HCC)    Eczema    Esophageal reflux    Hyperlipidemia    Liver disease    Pancreatitis (HCC)    Psoriasis      Past Surgical History:   Procedure Laterality Date    Colonoscopy screening - referral N/A 07/25/2022    Procedure: COLONOSCOPY-SCREENING/ ESOPHAGOGASTRODUODENOSCOPY (EGD)/ENDOSCOPIC ULTRASOUND with popssible Fine needle aspiration;  Surgeon: Eliezer Roth MD;  Location: [facility] ENDOSCOPY    Hernia surgery      double hernia removal when 7 years old      Family History   Problem Relation Age of Onset    Diabetes Mother     Cancer Sister         breast cancer    No Known Problems Brother     No Known Problems Brother     No Known Problems Brother     Cancer Sister       Social History:   Social History     Socioeconomic History    Marital status: Single   Tobacco Use    Smoking status: Former     Types: Cigarettes     Start date: 2/4/2025     Passive exposure: Current    Smokeless tobacco: Never    Tobacco comments:     3-4 a day    Vaping Use    Vaping status: Former    Quit date: 12/8/2024    Substances: Nicotine    Devices: Disposable   Substance and Sexual Activity    Alcohol use: Not Currently     Comment: drank 12 beers Sunday night (7-). He also reported having 5 beers last night and also used cocaine. He was previously sober for 22 months.    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 relapsed sunday used crack cocaine on 7-18-24  ( $50.00 of cocaine)   Other Topics Concern    Grew up on a farm No    History of tanning No    Outdoor occupation No    Reaction to local anesthetic No    Caffeine Concern Yes    Exercise No    Pt has a pacemaker No    Pt has a defibrillator No    Social History Narrative    The patient does not use an assistive device..      The patient does live in a home with stairs.     Social Drivers of Health     Food Insecurity: No Food Insecurity (2/10/2025)    NCSS - Food Insecurity     Worried About Running Out of Food in the Last Year: No     Ran Out of Food in the Last Year: No   Transportation Needs: No Transportation Needs (2/10/2025)    NCSS - Transportation     Lack of Transportation: No   Housing Stability: Not At Risk (2/10/2025)    NCSS - Housing/Utilities     Has Housing: Yes     Worried About Losing Housing: No     Unable to Get Utilities: No        Objective:   Physical Exam  Vitals and nursing note reviewed.   Constitutional:       Appearance: He is well-developed.   HENT:      Head: Normocephalic and atraumatic.      Right Ear: External ear normal.      Left Ear: External ear normal.      Nose: Nose normal.   Eyes:      Conjunctiva/sclera: Conjunctivae normal.      Pupils: Pupils are equal, round, and reactive to light.   Cardiovascular:      Rate and Rhythm: Normal rate and regular rhythm.      Heart sounds: Normal heart sounds.   Pulmonary:      Effort: Pulmonary effort is normal.      Breath sounds: Normal breath sounds.   Abdominal:      General: Bowel sounds are normal.      Palpations: Abdomen is soft.   Genitourinary:     Penis: Normal.       Prostate: Normal.      Rectum: Normal.   Musculoskeletal:         General: Tenderness present. Normal range of motion.      Cervical back: Normal range of motion and neck supple.      Comments: Neck, thoracic and low back pain    Skin:     General: Skin is warm and dry.   Neurological:      Mental Status: He is alert and oriented to person, place, and time.      Deep Tendon Reflexes: Reflexes are normal and symmetric.         Assessment & Plan:   1. Type 2 diabetes mellitus with hyperosmolarity without coma, without long-term current use of insulin ([facility]) will check A1c need to be more careful with diet    2. Neck pain will order an x-ray warm compression Juanis medication for pain will refer to physiatry   3. Chronic bilateral thoracic back pain as above we will follow x-ray results   4. Chronic bilateral low back pain without sciatica as above as need medmedication       Orders Placed This Encounter   Procedures    Hemoglobin A1C       Meds This Visit:  Requested Prescriptions      No prescriptions requested or ordered in this encounter       Imaging & Referrals:  PHYSIATRY - INTERNAL  XR CERVICAL SPINE (4VIEWS) (CPT=72050)  XR THORACIC SPINE (3 VIEWS) (CPT=72072)  XR LUMBAR SPINE (MIN 4 VIEWS) (CPT=72110)            [1]   Allergies  Allergen Reactions    Cat Hair Extract ITCHING

## 2025-03-28 LAB
EST. AVERAGE GLUCOSE BLD GHB EST-MCNC: 269 MG/DL (ref 68–126)
HBA1C MFR BLD: 11 % (ref ?–5.7)

## 2025-03-28 RX ORDER — GLIPIZIDE 10 MG/1
10 TABLET, FILM COATED, EXTENDED RELEASE ORAL 2 TIMES DAILY
Qty: 60 TABLET | Refills: 2 | Status: SHIPPED | OUTPATIENT
Start: 2025-03-28

## 2025-04-02 ENCOUNTER — TELEPHONE (OUTPATIENT)
Dept: SURGERY | Facility: CLINIC | Age: 55
End: 2025-04-02

## 2025-04-02 ENCOUNTER — HOSPITAL ENCOUNTER (OUTPATIENT)
Dept: GENERAL RADIOLOGY | Age: 55
Discharge: HOME | End: 2025-04-02
Attending: INTERNAL MEDICINE
Payer: COMMERCIAL

## 2025-04-02 DIAGNOSIS — R93.89 ABNORMAL X-RAY EXAMINATION: Primary | ICD-10-CM

## 2025-04-02 DIAGNOSIS — R93.89 ABNORMAL X-RAY EXAMINATION: ICD-10-CM

## 2025-04-02 PROCEDURE — 71046 X-RAY EXAM CHEST 2 VIEWS: CPT | Performed by: INTERNAL MEDICINE

## 2025-04-02 NOTE — TELEPHONE ENCOUNTER
Left message for patient. Patient needs to discuss management of his diabetes with PCP and endrocrinologist. Please call back to schedule surgery when blood sugars are managed.

## 2025-04-03 DIAGNOSIS — R93.89 ABNORMAL CXR: Primary | ICD-10-CM

## 2025-04-05 ENCOUNTER — HOSPITAL ENCOUNTER (OUTPATIENT)
Dept: CT IMAGING | Facility: HOSPITAL | Age: 55
Discharge: HOME | End: 2025-04-05
Attending: INTERNAL MEDICINE
Payer: COMMERCIAL

## 2025-04-05 DIAGNOSIS — R93.89 ABNORMAL CXR: ICD-10-CM

## 2025-04-05 PROCEDURE — 74177 CT ABD & PELVIS W/CONTRAST: CPT | Performed by: INTERNAL MEDICINE

## 2025-04-05 PROCEDURE — 71260 CT THORAX DX C+: CPT | Performed by: INTERNAL MEDICINE

## 2025-04-08 ENCOUNTER — LAB ENCOUNTER (OUTPATIENT)
Dept: LAB | Facility: HOSPITAL | Age: 55
End: 2025-04-08
Attending: INTERNAL MEDICINE
Payer: COMMERCIAL

## 2025-04-08 DIAGNOSIS — R79.89 HYPOURICEMIA: Primary | ICD-10-CM

## 2025-04-08 LAB
ALBUMIN SERPL-MCNC: 4.7 G/DL (ref 3.2–4.8)
ALP LIVER SERPL-CCNC: 90 U/L
ALT SERPL-CCNC: 43 U/L
AST SERPL-CCNC: 40 U/L (ref ?–34)
BILIRUB DIRECT SERPL-MCNC: 0.2 MG/DL (ref ?–0.3)
BILIRUB SERPL-MCNC: 0.5 MG/DL (ref 0.3–1.2)
CK SERPL-CCNC: 150 U/L
PROT SERPL-MCNC: 7.9 G/DL (ref 5.7–8.2)

## 2025-04-08 PROCEDURE — 82550 ASSAY OF CK (CPK): CPT

## 2025-04-08 PROCEDURE — 36415 COLL VENOUS BLD VENIPUNCTURE: CPT

## 2025-04-08 PROCEDURE — 80076 HEPATIC FUNCTION PANEL: CPT

## 2025-04-08 PROCEDURE — 80321 ALCOHOLS BIOMARKERS 1OR 2: CPT

## 2025-04-10 NOTE — TELEPHONE ENCOUNTER
Metformin 1000m month supply sent to [facility] in [place] on 2025    Patient requesting [place]    [place] script from [facility] on 2025

## 2025-04-11 RX ORDER — FOLIC ACID 1 MG/1
1 TABLET ORAL DAILY
Qty: 90 TABLET | Refills: 1 | OUTPATIENT
Start: 2025-04-11

## 2025-04-11 RX ORDER — OMEPRAZOLE 40 MG/1
40 CAPSULE, DELAYED RELEASE ORAL DAILY
Qty: 90 CAPSULE | Refills: 2 | Status: SHIPPED | OUTPATIENT
Start: 2025-04-11

## 2025-04-11 NOTE — TELEPHONE ENCOUNTER
Patient requesting pharmacy change to [facility].  Folic acid was transferred and filled by [facility] 4/8/25.  Omeprazole has 3 x #90 fills remaining to transfer:  Outpatient Medication Detail   Disp Refills Start End   Omeprazole 40 MG Oral Capsule Delayed Release 90 capsule 3 1/9/2025 --   Sig - Route: Take 1 capsule (40 mg total) by mouth daily. - Oral   Sent to pharmacy as: Omeprazole 40 MG Oral Capsule Delayed Release   E-Prescribing Status: Receipt confirmed by pharmacy (1/9/2025  1:07 PM CST)   Pharmacy  [id number] - [place], [place] - [address] AT [facility], [phone], [phone]

## 2025-04-14 LAB
PHOSPHATIDYETHANOL: NEGATIVE
PHOSPHATIDYLETHANOL (PETH): NEGATIVE NG/ML

## 2025-04-23 ENCOUNTER — APPOINTMENT (OUTPATIENT)
Dept: ENDOCRINOLOGY | Facility: HOSPITAL | Age: 55
End: 2025-04-23
Attending: INTERNAL MEDICINE
Payer: MEDICAID

## 2025-04-23 ENCOUNTER — MED REC SCAN ONLY (OUTPATIENT)
Dept: INTERNAL MEDICINE CLINIC | Facility: CLINIC | Age: 55
End: 2025-04-23

## 2025-04-28 ENCOUNTER — OFFICE VISIT (OUTPATIENT)
Dept: ENDOCRINOLOGY CLINIC | Facility: CLINIC | Age: 55
End: 2025-04-28
Payer: MEDICAID

## 2025-04-28 ENCOUNTER — LAB ENCOUNTER (OUTPATIENT)
Dept: LAB | Age: 55
End: 2025-04-28
Payer: COMMERCIAL

## 2025-04-28 VITALS
BODY MASS INDEX: 27.86 KG/M2 | WEIGHT: 199 LBS | HEART RATE: 96 BPM | SYSTOLIC BLOOD PRESSURE: 134 MMHG | DIASTOLIC BLOOD PRESSURE: 89 MMHG | HEIGHT: 71 IN

## 2025-04-28 DIAGNOSIS — E11.65 UNCONTROLLED TYPE 2 DIABETES MELLITUS WITH HYPERGLYCEMIA (HCC): ICD-10-CM

## 2025-04-28 DIAGNOSIS — E11.65 UNCONTROLLED TYPE 2 DIABETES MELLITUS WITH HYPERGLYCEMIA (HCC): Primary | ICD-10-CM

## 2025-04-28 LAB
GLUCOSE BLOOD: 420
HEMOGLOBIN A1C: 10 % (ref 4.3–5.6)
INSULIN SERPL-ACNC: 61.4 MU/L (ref 3–25)
TEST STRIP LOT #: NORMAL NUMERIC

## 2025-04-28 PROCEDURE — 99204 OFFICE O/P NEW MOD 45 MIN: CPT

## 2025-04-28 PROCEDURE — 36415 COLL VENOUS BLD VENIPUNCTURE: CPT

## 2025-04-28 PROCEDURE — 83525 ASSAY OF INSULIN: CPT

## 2025-04-28 PROCEDURE — 83036 HEMOGLOBIN GLYCOSYLATED A1C: CPT

## 2025-04-28 PROCEDURE — 82947 ASSAY GLUCOSE BLOOD QUANT: CPT

## 2025-04-28 PROCEDURE — 84681 ASSAY OF C-PEPTIDE: CPT

## 2025-04-28 RX ORDER — ACYCLOVIR 800 MG/1
1 TABLET ORAL
Qty: 2 EACH | Refills: 5 | Status: SHIPPED | OUTPATIENT
Start: 2025-04-28

## 2025-04-28 RX ORDER — INSULIN GLARGINE 100 [IU]/ML
20 INJECTION, SOLUTION SUBCUTANEOUS NIGHTLY
Qty: 15 ML | Refills: 5 | Status: SHIPPED | OUTPATIENT
Start: 2025-04-28

## 2025-04-28 RX ORDER — PEN NEEDLE, DIABETIC 32GX 5/32"
NEEDLE, DISPOSABLE MISCELLANEOUS
Qty: 100 EACH | Refills: 3 | Status: SHIPPED | OUTPATIENT
Start: 2025-04-28

## 2025-04-28 NOTE — PROGRESS NOTES
Name: Patel Lechuga  Date: 4/28/2025    Referring Physician: Aram Ann    HISTORY OF PRESENT ILLNESS   Patel Lechuga is a 54 year old male who presents for consult for diabetes mellitus     He has a history of recurrent pancreatitis. Last hospitalization for pancreatitis 2/2025 but has had hospitalizations for pancreatitis as far back as 2015.     Diabetes History:  Diagnosed- 2000  Patient has not had hospitalizations for blood sugar issues    Prior glycohemoglobin were 11.8% 3/2025; 10.0% POC today   Glucose in clinic today - 420 mg/dl     Dietary compliance: Fair- avoids red meats. Tries to follow low CHO diet.      Recall:  Breakfast- protein shake, eggs  Lunch- cook unity meals- choosing balanced options, or omelette if eating out, grilled chicken sandwich   Dinner- cook unity   Beverages- diet coke or water     Exercise: No- limited, new job and now working second shift. Does walk at work- at least 10,000 steps daily   Polyuria/polydipsia: Yes  Blurred vision: No    Episodes of hypoglycemia: No  Blood Glucose:    - Has not been checking glucose at home recently.    Previous DM therapies:  None     Current DM Regimen:  Glipizide 10 mg PO daily in the morning   Metformin 1000 mg PO daily     Modifying factors:  Medication adherence: Yes   Recent steroids, illness or infections: Pancreatitis- recurrent, last hospitalization 2/2025      REVIEW OF SYSTEMS  Eyes: Diabetic retinopathy present: No            Most recent visit to eye doctor in last 12 months: Yes    CV: Cardiovascular disease present: No         Hypertension present: Yes         Hyperlipidemia present: Yes         Peripheral Vascular Disease present: No    : Nephropathy present: No    Neuro: Neuropathy present: No    Skin: Infection or ulceration: No    Osteoporosis: No    Thyroid disease: No      Medications:   Medications - Current[1]     Allergies:   Allergies[2]    Social History:   Social Hx on file[3]    Medical History:   Past Medical  History[4]    Surgical history:   Past Surgical History[5]      PHYSICAL EXAM  Vitals:    04/28/25 1320   BP: 134/89   Pulse: 96   Weight: 199 lb (90.3 kg)   Height: 5' 11\" (1.803 m)       General Appearance:  alert, well developed, in no acute distress  Eyes:  normal conjunctivae, sclera, and normal pupils  Neck: Trachea midline: Normal  Back: no kyphosis or back tenderness  Lymph Nodes:  No abnormal nodes noted  Musculoskeletal:  normal muscle strength and tone  Skin:  normal moisture and skin texture  Hair & Nails:  normal scalp hair     Hematologic:  no excessive bruising  Neuro:  sensory grossly intact and motor grossly intact.  Psychiatric:  oriented to time, self, and place  Nutritional:  no abnormal weight gain or loss      ASSESSMENT/PLAN:    Diabetes mellitus type 2 uncontrolled   - HgA1c- 10.0%  - Reviewed pathogenesis of diabetes.   - Reviewed importance of good glycemic control to prevent microvascular and macrovascular complications including nephropathy, neuropathy, retinopathy, and cardiovascular disease.  - Reviewed importance of SBGM- check glucose 3 times daily   - Reviewed target glucose goals for patient  fasting and <180 post prandially   - Reviewed importance of following diabetic diet- recommended 135 grams of CHO per day or 45 grams per meal.   - Provided patient education materials    - Discussed with history of recurrent pancreatitis likely some damage to pancreas and insulin producing cells.   - Recommend starting insulin given history of pancreatitis and significant hyperglycemia in clinic today. He is agreeable.     - Start Lantus 20 units subcutaneous once daily. Reviewed insulin administration and timing and importance of rotating injection sites.     - Continue Glipizide 10mg PO daily.     - Continue current dose of metformin.     - Avoid weekly GLP-1 agonist at this time given history of pancreatitis.     - Reviewed importance of SBGM- start CGM- Freestyle Keshav prescribed and  will plan to connect to clinic.     - normotensive   - Lipids- triglycerides 194 2/2025; LDL- 74 9/2024  - No nephropathy- last screen 1/2025  - Reviewed importance of yearly optho follow up- schedule yearly exam  - Plan for review of CGM data around 1 week following visit.   - elevated LFT's- following with hepatology and GI    RTC in 6 weeks   4/28/2025  DO Kumari    A total of  40 minutes was spent on obtaining history, reviewing pertinent imaging/labs and specialists notes, evaluating patient, providing multiple treatment options, reinforcing diet/exercise and compliance, and completing documentation.               [1]   Current Outpatient Medications:     Continuous Glucose Sensor (FREESTYLE ROBERT 3 SENSOR) Does not apply Misc, 1 each every 14 (fourteen) days., Disp: 2 each, Rfl: 5    insulin glargine (LANTUS SOLOSTAR) 100 UNIT/ML Subcutaneous Solution Pen-injector, Inject 20 Units into the skin nightly., Disp: 15 mL, Rfl: 5    Insulin Pen Needle (CARETOUCH PEN NEEDLES) 32G X 4 MM Does not apply Misc, Use with injection daily, Disp: 100 each, Rfl: 3    glipiZIDE ER 10 MG Oral Tablet 24 Hr, Take 1 tablet (10 mg total) by mouth in the morning and 1 tablet (10 mg total) before bedtime. (Patient taking differently: Take 1 tablet (10 mg total) by mouth daily.), Disp: 60 tablet, Rfl: 2    metFORMIN HCl 1000 MG Oral Tab, Take 1 tablet (1,000 mg total) by mouth 2 (two) times daily with meals., Disp: 180 tablet, Rfl: 1    Omeprazole 40 MG Oral Capsule Delayed Release, Take 1 capsule (40 mg total) by mouth daily., Disp: 90 capsule, Rfl: 2    traZODone 100 MG Oral Tab, Take 1 tablet (100 mg total) by mouth nightly., Disp: 30 tablet, Rfl: 2    Naloxone HCl 4 MG/0.1ML Nasal Liquid, 4 mg by Nasal route as needed. If patient remains unresponsive, repeat dose in other nostril 2-5 minutes after first dose. (Patient not taking: Reported on 3/27/2025), Disp: 1 kit, Rfl: 0    PEG 3350-KCl-Na Bicarb-NaCl (TRILYTE)  420 g Oral Recon Soln, Take prep as directed by gastro office. May substitute with Trilyte/generic equivalent if needed., Disp: 4000 mL, Rfl: 0    clobetasol 0.05 % External Ointment, Apply twice daily  Monday-Friday. Take weekends off. Use on hands and elbows., Disp: 60 g, Rfl: 5    Risankizumab-rzaa (SKYRIZI PEN) 150 MG/ML Subcutaneous Solution Auto-injector, Inject 150 mg into the skin every 3 (three) months., Disp: 1 mL, Rfl: 3    folic acid 1 MG Oral Tab, Take 1 tablet (1 mg total) by mouth daily., Disp: 90 tablet, Rfl: 1  [2]   Allergies  Allergen Reactions    Cat Hair Extract ITCHING   [3]   Social History  Socioeconomic History    Marital status: Single   Tobacco Use    Smoking status: Former     Types: Cigarettes     Start date: 2/4/2025     Passive exposure: Current    Smokeless tobacco: Never    Tobacco comments:     3-4 a day    Vaping Use    Vaping status: Former    Quit date: 12/8/2024    Substances: Nicotine    Devices: Disposable   Substance and Sexual Activity    Alcohol use: Not Currently     Comment: drank 12 beers Sunday night (7-). He also reported having 5 beers last night and also used cocaine. He was previously sober for 22 months.    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 relapsed sunday used crack cocaine on 7-18-24  ( $50.00 of cocaine)   Other Topics Concern    Grew up on a farm No    History of tanning No    Outdoor occupation No    Reaction to local anesthetic No    Caffeine Concern Yes    Exercise No    Pt has a pacemaker No    Pt has a defibrillator No   [4]   Past Medical History:   Depression    Diabetes (HCC)    Eczema    Esophageal reflux    Hyperlipidemia    Liver disease    Pancreatitis (HCC)    Psoriasis   [5]   Past Surgical History:  Procedure Laterality Date    Colonoscopy screening - referral N/A 07/25/2022    Procedure: COLONOSCOPY-SCREENING/ ESOPHAGOGASTRODUODENOSCOPY (EGD)/ENDOSCOPIC ULTRASOUND with  popssible Fine needle aspiration;  Surgeon: Eliezer Roth MD;  Location: [facility] ENDOSCOPY    Hernia surgery      double hernia removal when 7 years old

## 2025-04-29 ENCOUNTER — TELEPHONE (OUTPATIENT)
Dept: ENDOCRINOLOGY CLINIC | Facility: CLINIC | Age: 55
End: 2025-04-29

## 2025-04-29 ENCOUNTER — PATIENT MESSAGE (OUTPATIENT)
Dept: ENDOCRINOLOGY CLINIC | Facility: CLINIC | Age: 55
End: 2025-04-29

## 2025-04-29 NOTE — TELEPHONE ENCOUNTER
Patient calling states needs prior authorization for freestyle harsh 3 sensors. Please call.     Phone number for PA: [phone]

## 2025-04-30 ENCOUNTER — TELEPHONE (OUTPATIENT)
Dept: ENDOCRINOLOGY CLINIC | Facility: CLINIC | Age: 55
End: 2025-04-30

## 2025-04-30 LAB — C-PEPTIDE: 8.3 NG/ML

## 2025-04-30 NOTE — TELEPHONE ENCOUNTER
Do we have a sample we can offer patient until PA is completed? He was started on insulin yesterday.

## 2025-04-30 NOTE — TELEPHONE ENCOUNTER
Medication Quantity Refills Start End   Continuous Glucose Sensor (FREESTYLE ROBERT 3 SENSOR) Does not apply Misc 2 each 5 2025 --   Si each every 14 (fourteen) days.     HAYLIE kilgore   Key:[id number]

## 2025-04-30 NOTE — TELEPHONE ENCOUNTER
Called pt and offered sample. Pt will  at [facility].     Other TE already routed to RN's for PA.

## 2025-05-05 NOTE — TELEPHONE ENCOUNTER
PA completed via [employer]:  Case Id  [id number]  Reference Id  [id number]

## 2025-05-13 ENCOUNTER — HOSPITAL ENCOUNTER (OUTPATIENT)
Facility: HOSPITAL | Age: 55
Setting detail: HOSPITAL OUTPATIENT SURGERY
Discharge: HOME | End: 2025-05-13
Attending: INTERNAL MEDICINE | Admitting: INTERNAL MEDICINE
Payer: COMMERCIAL

## 2025-05-13 ENCOUNTER — ANESTHESIA (OUTPATIENT)
Dept: ENDOSCOPY | Facility: HOSPITAL | Age: 55
End: 2025-05-13
Payer: COMMERCIAL

## 2025-05-13 ENCOUNTER — ANESTHESIA EVENT (OUTPATIENT)
Dept: ENDOSCOPY | Facility: HOSPITAL | Age: 55
End: 2025-05-13
Payer: COMMERCIAL

## 2025-05-13 VITALS
WEIGHT: 200 LBS | HEIGHT: 71 IN | RESPIRATION RATE: 20 BRPM | SYSTOLIC BLOOD PRESSURE: 118 MMHG | DIASTOLIC BLOOD PRESSURE: 78 MMHG | BODY MASS INDEX: 28 KG/M2 | HEART RATE: 70 BPM | OXYGEN SATURATION: 92 % | TEMPERATURE: 98 F

## 2025-05-13 DIAGNOSIS — R11.0 NAUSEA: ICD-10-CM

## 2025-05-13 DIAGNOSIS — K76.9 LIVER DISEASE: ICD-10-CM

## 2025-05-13 DIAGNOSIS — K21.9 GASTROESOPHAGEAL REFLUX DISEASE, UNSPECIFIED WHETHER ESOPHAGITIS PRESENT: ICD-10-CM

## 2025-05-13 DIAGNOSIS — Z12.11 COLON CANCER SCREENING: ICD-10-CM

## 2025-05-13 PROBLEM — K64.8 INTERNAL HEMORRHOIDS: Status: ACTIVE | Noted: 2025-05-13

## 2025-05-13 PROBLEM — K63.5 POLYP OF COLON: Status: ACTIVE | Noted: 2025-05-13

## 2025-05-13 LAB — GLUCOSE BLDC GLUCOMTR-MCNC: 104 MG/DL (ref 70–99)

## 2025-05-13 PROCEDURE — 43239 EGD BIOPSY SINGLE/MULTIPLE: CPT | Performed by: INTERNAL MEDICINE

## 2025-05-13 PROCEDURE — 45385 COLONOSCOPY W/LESION REMOVAL: CPT | Performed by: INTERNAL MEDICINE

## 2025-05-13 RX ORDER — LIDOCAINE HYDROCHLORIDE 10 MG/ML
INJECTION, SOLUTION EPIDURAL; INFILTRATION; INTRACAUDAL; PERINEURAL AS NEEDED
Status: DISCONTINUED | OUTPATIENT
Start: 2025-05-13 | End: 2025-05-13 | Stop reason: SURG

## 2025-05-13 RX ORDER — SODIUM CHLORIDE, SODIUM LACTATE, POTASSIUM CHLORIDE, CALCIUM CHLORIDE 600; 310; 30; 20 MG/100ML; MG/100ML; MG/100ML; MG/100ML
INJECTION, SOLUTION INTRAVENOUS CONTINUOUS
Status: DISCONTINUED | OUTPATIENT
Start: 2025-05-13 | End: 2025-05-13

## 2025-05-13 RX ORDER — NALOXONE HYDROCHLORIDE 0.4 MG/ML
0.08 INJECTION, SOLUTION INTRAMUSCULAR; INTRAVENOUS; SUBCUTANEOUS ONCE AS NEEDED
Status: DISCONTINUED | OUTPATIENT
Start: 2025-05-13 | End: 2025-05-13

## 2025-05-13 RX ADMIN — LIDOCAINE HYDROCHLORIDE 50 MG: 10 INJECTION, SOLUTION EPIDURAL; INFILTRATION; INTRACAUDAL; PERINEURAL at 13:16:00

## 2025-05-13 RX ADMIN — LIDOCAINE HYDROCHLORIDE 50 MG: 10 INJECTION, SOLUTION EPIDURAL; INFILTRATION; INTRACAUDAL; PERINEURAL at 12:56:00

## 2025-05-13 RX ADMIN — SODIUM CHLORIDE, SODIUM LACTATE, POTASSIUM CHLORIDE, CALCIUM CHLORIDE: 600; 310; 30; 20 INJECTION, SOLUTION INTRAVENOUS at 13:28:00

## 2025-05-13 NOTE — DISCHARGE INSTRUCTIONS
Home Care Instructions for Colonoscopy and/or Gastroscopy with Sedation    Diet:  - Resume your regular diet as tolerated unless otherwise instructed.  - Start with light meals to minimize bloating.  - Do not drink alcohol today.    Medication:  - If you have questions about resuming your normal medications, please contact your Primary Care Physician.    Activities:  - Take it easy today. Do not return to work today.  - Do not drive today.  - Do not operate any machinery today (including kitchen equipment).  - Do not make any critical decisions or sign any paperwork.  - Do not exercise today.    Colonoscopy:  - You may notice some rectal \"spotting\" (a little blood on the toilet tissue) for a day or two after the exam. This is normal.  - If you experience any rectal bleeding (not spotting), persistent tenderness or sharp severe abdominal pains, oral temperature over 100 degrees Fahrenheit, light-headedness or dizziness, or any other problems, contact your doctor.    Gastroscopy:  - You may have a sore throat for 2-3 days following the exam. This is normal. Gargling with warm salt water (1/2 tsp salt to 1 glass warm water) or using throat lozenges will help.  - If you experience any sharp pain in your neck, abdomen or chest, vomiting of blood, oral temperature over 100 degrees Fahrenheit, light-headedness or dizziness, or any other problems, contact your doctor.    **If unable to reach your doctor, please go to the [facility] Emergency Room**    - Your referring physician will receive a full report of your examination.  - If you do not hear from your doctor's office within two weeks of your biopsy, please call them for your results.    You may be able to see your laboratory results in [employer] between 4 and 7 business days.  In some cases, your physician may not have viewed the results before they are released to [employer].  If you have questions regarding your results contact the physician who ordered the  test/exam by phone or via [employer] by choosing \"Ask a Medical Question.\"

## 2025-05-13 NOTE — ANESTHESIA POSTPROCEDURE EVALUATION
Patient: Patel Lechuga    Procedure Summary       Date: 05/13/25 Room / Location: [facility] ENDOSCOPY 01 / EM ENDOSCOPY    Anesthesia Start: 1254 Anesthesia Stop:     Procedures:       COLONOSCOPY      ESOPHAGOGASTRODUODENOSCOPY (EGD) Diagnosis:       Colon cancer screening      Gastroesophageal reflux disease, unspecified whether esophagitis present      Nausea      Liver disease      (Colon Polyp, Hemorrhoids, Normal EGD)    Surgeons: Samantha Erwin MD Anesthesiologist: Ceci Rangel CRNA    Anesthesia Type: MAC ASA Status: 3            Anesthesia Type: MAC    Vitals Value Taken Time   /66 05/13/25 13:32   Temp 97.5 °F (36.4 °C) 05/13/25 13:32   Pulse 77 05/13/25 13:32   Resp 20 05/13/25 13:32   SpO2 94 % 05/13/25 13:32       [facility] AN Post Evaluation:   Patient Evaluated in PACU  Patient Participation: complete - patient participated  Level of Consciousness: sleepy but conscious  Pain Score: 0  Pain Management: adequate  Airway Patency:patent  Dental exam unchanged from preop  Yes    Cardiovascular Status: stable and acceptable  Respiratory Status: acceptable and room air  Postoperative Hydration acceptable      CECI RANGEL CRNA  5/13/2025 1:33 PM

## 2025-05-13 NOTE — H&P
History & Physical Examination    Patient Name: Patel Lechuga  MRN: [id number]  [facility]: [id number]  YOB: 1970    Diagnosis: Hx colon polyps, GERD, nausea, advanced liver disease - variceal screening      Prescriptions Prior to Admission[1]  Current Hospital Medications[2]    Allergies: Allergies[3]    Past Medical History[4]  Past Surgical History[5]  Family History[6]  Social History     Tobacco Use    Smoking status: Every Day     Types: Cigarettes     Start date: 2/4/2025     Passive exposure: Current    Smokeless tobacco: Never    Tobacco comments:     3-4 a day    Substance Use Topics    Alcohol use: Not Currently     Comment: drank 12 beers Sunday night (7-). He also reported having 5 beers last night and also used cocaine. He was previously sober for 22 months.       SYSTEM Check if Review is Normal Check if Physical Exam is Normal If not normal, please explain:   HEENT [X ] [ X]    NECK  [X ] [ X]    HEART [X ] [ X]    LUNGS [X ] [ X]    ABDOMEN [X ] [ X]    EXTREMITIES [X ] [ X]    OTHER        I have discussed the risks and benefits and alternatives of the procedure with the patient/family.  They understand and agree to proceed with plan of care.   I have reviewed the History and Physical done within the last 30 days.  Any changes noted above.    Samantha Erwin MD                 [1]   Medications Prior to Admission   Medication Sig Dispense Refill Last Dose/Taking    insulin glargine (LANTUS SOLOSTAR) 100 UNIT/ML Subcutaneous Solution Pen-injector Inject 20 Units into the skin nightly. (Patient taking differently: Inject 30 Units into the skin every morning.) 15 mL 5 5/12/2025    Omeprazole 40 MG Oral Capsule Delayed Release Take 1 capsule (40 mg total) by mouth daily. 90 capsule 2 5/11/2025    traZODone 100 MG Oral Tab Take 1 tablet (100 mg total) by mouth nightly. 30 tablet 2 5/11/2025    clobetasol 0.05 % External Ointment Apply twice daily  Monday-Friday. Take weekends off.  Use on hands and elbows. 60 g 5 5/9/2025    Risankizumab-rzaa (SKYRIZI PEN) 150 MG/ML Subcutaneous Solution Auto-injector Inject 150 mg into the skin every 3 (three) months. 1 mL 3 4/13/2025    folic acid 1 MG Oral Tab Take 1 tablet (1 mg total) by mouth daily. 90 tablet 1 5/11/2025    Continuous Glucose Sensor (FREESTYLE ROBERT 3 SENSOR) Does not apply Misc 1 each every 14 (fourteen) days. 2 each 5     Insulin Pen Needle (CARETOUCH PEN NEEDLES) 32G X 4 MM Does not apply Misc Use with injection daily 100 each 3     glipiZIDE ER 10 MG Oral Tablet 24 Hr Take 1 tablet (10 mg total) by mouth in the morning and 1 tablet (10 mg total) before bedtime. (Patient not taking: Reported on 5/7/2025) 60 tablet 2 Not Taking    Naloxone HCl 4 MG/0.1ML Nasal Liquid 4 mg by Nasal route as needed. If patient remains unresponsive, repeat dose in other nostril 2-5 minutes after first dose. (Patient not taking: Reported on 3/27/2025) 1 kit 0     PEG 3350-KCl-Na Bicarb-NaCl (TRILYTE) 420 g Oral Recon Soln Take prep as directed by gastro office. May substitute with Trilyte/generic equivalent if needed. 4000 mL 0     metFORMIN HCl 1000 MG Oral Tab Take 1 tablet (1,000 mg total) by mouth 2 (two) times daily with meals. (Patient not taking: Reported on 5/7/2025) 180 tablet 1 Not Taking   [2]   Current Facility-Administered Medications   Medication Dose Route Frequency    lactated ringers infusion   Intravenous Continuous   [3]   Allergies  Allergen Reactions    Cat Hair Extract ITCHING   [4]   Past Medical History:   Depression    Diabetes (HCC)    Eczema    Esophageal reflux    Hyperlipidemia    Liver disease    Pancreatitis (HCC)    Psoriasis    Visual impairment    CONTACTS/GLASSES   [5]   Past Surgical History:  Procedure Laterality Date    Colonoscopy screening - referral N/A 07/25/2022    Procedure: COLONOSCOPY-SCREENING/ ESOPHAGOGASTRODUODENOSCOPY (EGD)/ENDOSCOPIC ULTRASOUND with popssible Fine needle aspiration;  Surgeon: Ira  MD Eliezer;  Location: [facility] ENDOSCOPY    Hernia surgery      double hernia removal when 7 years old   [6]   Family History  Problem Relation Age of Onset    Diabetes Mother     Cancer Sister         breast cancer    No Known Problems Brother     No Known Problems Brother     No Known Problems Brother     Cancer Sister

## 2025-05-13 NOTE — OPERATIVE REPORT
ESOPHAGOGASTRODUODENOSCOPY (EGD) & COLONOSCOPY REPORT    Patel Lechuga     9/15/1970 Age 54 year old   PCP Aram Ann MD Endoscopist Samantha Erwin MD       Date of procedure: 25    Procedure: EGD w/ biopsies & Colonoscopy w/ cold snare polypectomy    Pre-operative diagnosis: hx colon polyps, GERD, nausea, advanced liver disease     Post-operative diagnosis: colon polyp, hemorrhoids    Medications: MAC    Withdrawal time: 14 minutes    Complications: none    Procedure: Informed consent was obtained from the patient after the risks of the procedure were discussed, including but not limited to bleeding, perforation, aspiration, infection, or possibility of a missed lesion. We discussed the risks/benefits and alternatives to this procedure, as well as the planned sedation. EGD procedure: The patient was placed in the left lateral decubitus position and begun on continuous blood pressure pulse oximetry and EKG monitoring and this was maintained throughout the procedure. Once an adequate level of sedation was obtained a bite block was placed. Then the lubricated tip of the [id number] diagnostic video upper endoscope was inserted and advanced using direct visualization into the posterior pharynx and ultimately into the esophagus with  distal extent of the second portion of the duodenum.     Colonoscopy procedure: Once an adequate level of sedation was obtained a digital rectal exam was completed. Then the lubricated tip of the [id number] diagnostic video colonoscope was inserted and advanced without difficulty to the cecum using the CO2 insufflation technique. The cecum was identified by localizing the trifold, the appendix and the ileocecal valve. A routine second examination of the cecum/ascending colon was performed. Withdrawal was begun with thorough washing and careful examination of the colonic walls and folds. Photodocumentation was obtained. The bowel prep was good. Views of the colon  were good with washing. I then carefully withdrew the instrument from the patient who tolerated the procedure well.     Complications: None    EGD findings:      1. Esophagus: The squamocolumnar junction was noted at 39 cm and appeared regular. The diaphragmatic pinch was noted noted at 39 cm from the incisors. The esophageal mucosa appeared normal. There was no evidence of esophageal varices, esophagitis, stricture or endoscopic evidence of Rankin's esophagus.  2. Stomach: The stomach distended normally. Normal rugal folds were seen. The pylorus was patent. Retroflexion revealed a normal fundus. The gastric mucosa appeared normal. Biopsies were taken with a cold forceps from the antrum, incisura and body for histology.   3. Duodenum: The duodenal mucosa appeared normal in the bulb and 2nd portion of the duodenum.     EGD Impression:  -Esophagus: Normal. No esophageal varices.   -Stomach: Normal. No gastric varices. Biopsied.   -Duodenum: Normal.     Colonoscopy findings:    1. One polyp noted as follows:      A. 4 mm polyp in the transverse colon; sessile morphology; cold snare polypectomy performed, polyp retrieved.  2. Diverticulosis: none.  3. Ileocecal valve appeared normal.  4. The colonic mucosa throughout the colon showed normal vascular pattern, without evidence of angioectasias or inflammation.   5. A retroflexed view of the rectum revealed small internal hemorrhoids.  6. EVELIO: normal rectal tone, no masses palpated.     Colonoscopy Impression:  One small (< 5 mm) polyp removed.   Small internal hemorrhoids.   Colon was otherwise normal with glistening mucosa and intact vascular pattern throughout.    Recommend:  Await pathology. The interval for the next colonoscopy will be determined after reviewing pathology. If new signs or symptoms develop, colonoscopy may need to be repeated sooner.   High fiber diet.  Monitor for blood in the stool. If having more than just tinge of blood, call office or go to the  ER.  Avoid NSAIDs (motrin, ibuprofen, aleve, advil, naproxen, midol, naprosyn, excedrin) for 14 days.     >>>If tissue was obtained and you have not received your pathology results either by phone or letter within 2 weeks, please call our office at [phone].    Specimens: colon polyp, gastric biopsies      Blood loss: <1 ml

## 2025-05-13 NOTE — ANESTHESIA PREPROCEDURE EVALUATION
Anesthesia PreOp Note    HPI:     Patel Lechuga is a 54 year old male who presents for preoperative consultation requested by: Samantha Erwin MD    Date of Surgery: 5/13/2025    Procedure(s):  COLONOSCOPY  ESOPHAGOGASTRODUODENOSCOPY (EGD)  Indication: Colon cancer screening/ Gastroesophageal reflux disease, unspecified whether esophagitis present/ Nausea/ Liver disease    Relevant Problems   No relevant active problems       NPO:  Last Liquid Consumption Date: 05/13/25  Last Liquid Consumption Time: 1000  Last Solid Consumption Date: 05/12/25  Last Solid Consumption Time: 1000  Last Liquid Consumption Date: 05/13/25          History Review:  Patient Active Problem List    Diagnosis Date Noted    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 02/14/2025    Acute pancreatitis without infection or necrosis, unspecified pancreatitis type ([facility]) 02/09/2025    Type 2 diabetes mellitus with hyperosmolarity without coma, without long-term current use of insulin ([facility])     Primary hypertension     Depression with anxiety     History of pancreatitis     Dyspepsia     Encounter for colorectal cancer screening     Biliary sludge     Acute pancreatitis, unspecified complication status, unspecified pancreatitis type ([facility]) 05/18/2021    Gastroesophageal reflux disease     History of depression 05/05/2020    Alcohol abuse, in remission 02/27/2020    History of drug abuse in remission ([facility]) 02/27/2020    Psoriasis 02/27/2020    Smoker 02/27/2020    Severe recurrent major depression without psychotic features ([facility]) 11/02/2019    Alcohol use disorder, severe, dependence ([facility]) 11/02/2019    Stimulant use disorder 11/02/2019    Major depression 11/01/2019    EKG, abnormal 02/15/2019    Vision problems 02/15/2019    Hyperlipidemia 02/15/2019    Type 2 diabetes mellitus without complication, without long-term current use of insulin ([facility]) 01/13/2019    History of diabetes mellitus 01/11/2019    Erectile dysfunction 01/11/2019    Rash in  adult 01/11/2019    Tiredness 11/01/2018       Past Medical History[1]    Past Surgical History[2]    Prescriptions Prior to Admission[3]  Current Medications and Prescriptions Ordered in Epic[4]    Allergies[5]    Family History[6]  Social Hx on file[7]    Available pre-op labs reviewed.  Lab Results   Component Value Date    WBC 6.6 03/14/2025    RBC 4.89 03/14/2025    HGB 14.9 03/14/2025    HCT 41.7 03/14/2025    MCV 85.3 03/14/2025    MCH 30.5 03/14/2025    MCHC 35.7 03/14/2025    RDW 11.4 03/14/2025    .0 03/14/2025     Lab Results   Component Value Date     (L) 03/14/2025    K 4.7 03/14/2025     03/14/2025    CO2 27.0 03/14/2025    BUN 10 03/14/2025    CREATSERUM 1.11 03/14/2025     (H) 03/14/2025    PGLU 104 (H) 05/13/2025    CA 9.4 03/14/2025          Vital Signs:  Body mass index is 27.89 kg/m².   height is 1.803 m (5' 11\") and weight is 90.7 kg (200 lb). His blood pressure is 139/80 and his pulse is 68. His respiration is 19 and oxygen saturation is 97%.   Vitals:    05/07/25 1201 05/13/25 1232   BP:  139/80   Pulse:  68   Resp:  19   SpO2:  97%   Weight: 90.7 kg (200 lb)    Height: 1.803 m (5' 11\")         Anesthesia Evaluation     Patient summary reviewed and Nursing notes reviewed    Airway   Mallampati: III  TM distance: >3 FB  Neck ROM: full  Dental - Dentition appears grossly intact     Pulmonary - negative ROS and normal exam   Cardiovascular - normal exam  (-) hypertension, past MI, CAD    Neuro/Psych    (+)   depression    (-) seizures, CVA    GI/Hepatic/Renal    (+) GERD, liver disease    Endo/Other    (+) diabetes mellitus type 2 well controlled using insulin  Abdominal  - normal exam                 Anesthesia Plan:   ASA:  3  Plan:   MAC  Informed Consent Plan and Risks Discussed With:  Patient  Discussed plan with:  Surgeon      I have informed Patel Lechuga and/or legal guardian or family member of the nature of the anesthetic plan, benefits, risks including  possible dental damage if relevant, major complications, and any alternative forms of anesthetic management.   All of the patient's questions were answered to the best of my ability. The patient desires the anesthetic management as planned.  BOB RANGELELIEL  5/13/2025 12:47 PM  Present on Admission:  **None**           [1]   Past Medical History:   Depression    Diabetes (HCC)    Eczema    Esophageal reflux    Hyperlipidemia    Liver disease    Pancreatitis (HCC)    Psoriasis    Visual impairment    CONTACTS/GLASSES   [2]   Past Surgical History:  Procedure Laterality Date    Colonoscopy screening - referral N/A 07/25/2022    Procedure: COLONOSCOPY-SCREENING/ ESOPHAGOGASTRODUODENOSCOPY (EGD)/ENDOSCOPIC ULTRASOUND with popssible Fine needle aspiration;  Surgeon: Eliezer Roth MD;  Location: [facility] ENDOSCOPY    Hernia surgery      double hernia removal when 7 years old   [3]   Medications Prior to Admission   Medication Sig Dispense Refill Last Dose/Taking    insulin glargine (LANTUS SOLOSTAR) 100 UNIT/ML Subcutaneous Solution Pen-injector Inject 20 Units into the skin nightly. (Patient taking differently: Inject 30 Units into the skin every morning.) 15 mL 5 5/12/2025    Omeprazole 40 MG Oral Capsule Delayed Release Take 1 capsule (40 mg total) by mouth daily. 90 capsule 2 5/11/2025    traZODone 100 MG Oral Tab Take 1 tablet (100 mg total) by mouth nightly. 30 tablet 2 5/11/2025    clobetasol 0.05 % External Ointment Apply twice daily  Monday-Friday. Take weekends off. Use on hands and elbows. 60 g 5 5/9/2025    Risankizumab-rzaa (SKYRIZI PEN) 150 MG/ML Subcutaneous Solution Auto-injector Inject 150 mg into the skin every 3 (three) months. 1 mL 3 4/13/2025    folic acid 1 MG Oral Tab Take 1 tablet (1 mg total) by mouth daily. 90 tablet 1 5/11/2025    Continuous Glucose Sensor (FREESTYLE ROBERT 3 SENSOR) Does not apply Misc 1 each every 14 (fourteen) days. 2 each 5     Insulin Pen Needle (CARETOUCH PEN  NEEDLES) 32G X 4 MM Does not apply Misc Use with injection daily 100 each 3     glipiZIDE ER 10 MG Oral Tablet 24 Hr Take 1 tablet (10 mg total) by mouth in the morning and 1 tablet (10 mg total) before bedtime. (Patient not taking: Reported on 5/7/2025) 60 tablet 2 Not Taking    Naloxone HCl 4 MG/0.1ML Nasal Liquid 4 mg by Nasal route as needed. If patient remains unresponsive, repeat dose in other nostril 2-5 minutes after first dose. (Patient not taking: Reported on 3/27/2025) 1 kit 0     PEG 3350-KCl-Na Bicarb-NaCl (TRILYTE) 420 g Oral Recon Soln Take prep as directed by gastro office. May substitute with Trilyte/generic equivalent if needed. 4000 mL 0     metFORMIN HCl 1000 MG Oral Tab Take 1 tablet (1,000 mg total) by mouth 2 (two) times daily with meals. (Patient not taking: Reported on 5/7/2025) 180 tablet 1 Not Taking   [4]   Current Facility-Administered Medications Ordered in Epic   Medication Dose Route Frequency Provider Last Rate Last Admin    lactated ringers infusion   Intravenous Continuous Samantha Erwin MD 20 mL/hr at 05/13/25 1238 New Bag at 05/13/25 1238     No current [facility]-ordered outpatient medications on file.   [5]   Allergies  Allergen Reactions    Cat Hair Extract ITCHING   [6]   Family History  Problem Relation Age of Onset    Diabetes Mother     Cancer Sister         breast cancer    No Known Problems Brother     No Known Problems Brother     No Known Problems Brother     Cancer Sister    [7]   Social History  Socioeconomic History    Marital status: Single   Tobacco Use    Smoking status: Every Day     Types: Cigarettes     Start date: 2/4/2025     Passive exposure: Current    Smokeless tobacco: Never    Tobacco comments:     3-4 a day    Vaping Use    Vaping status: Former    Quit date: 12/8/2024    Substances: Nicotine    Devices: Disposable   Substance and Sexual Activity    Alcohol use: Not Currently     Comment: drank 12 beers Sunday night (7-). He also  reported having 5 beers last night and also used cocaine. He was previously sober for 22 months.    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 relapsed sunday used crack cocaine on 7-18-24  ( $50.00 of cocaine)   Other Topics Concern    Grew up on a farm No    History of tanning No    Outdoor occupation No    Reaction to local anesthetic No    Caffeine Concern Yes    Exercise No    Pt has a pacemaker No    Pt has a defibrillator No

## 2025-05-15 ENCOUNTER — TELEPHONE (OUTPATIENT)
Facility: CLINIC | Age: 55
End: 2025-05-15

## 2025-05-15 NOTE — TELEPHONE ENCOUNTER
Recall colonoscopy in 5 years per Dr. Erwin.     Last done 5/13/2025.     Recall entered into patient outreach for 5/13/2030.     Health maintenance updated.

## 2025-05-15 NOTE — TELEPHONE ENCOUNTER
----- Message from Samantha Erwin sent at 5/14/2025  8:25 PM CDT -----  GI Staff:    Can you please place recall for this patient to have a colonoscopy in 5 years.    Thank you,  Samantha Garcia -  ----- Message -----  From: Lab, Background User  Sent: 5/14/2025  10:46 AM CDT  To: Samantha Erwin MD

## 2025-05-15 NOTE — PROGRESS NOTES
GI Staff:    Can you please place recall for this patient to have a colonoscopy in 5 years.    Thank you,  Samantha Garcia -

## 2025-05-20 RX ORDER — TRAZODONE HYDROCHLORIDE 100 MG/1
100 TABLET ORAL NIGHTLY
Qty: 90 TABLET | Refills: 0 | Status: SHIPPED | OUTPATIENT
Start: 2025-05-20

## 2025-05-22 RX ORDER — INSULIN GLARGINE 100 [IU]/ML
30 INJECTION, SOLUTION SUBCUTANEOUS EVERY MORNING
Qty: 30 ML | Refills: 1 | Status: SHIPPED | OUTPATIENT
Start: 2025-05-22

## 2025-05-22 NOTE — TELEPHONE ENCOUNTER
Endocrine refill protocol for basal insulins     Protocol Criteria: FAILED Reason: Elevated A1C    If all below requirements are met, send a 90-day supply with 1 refill per provider protocol.       Verify Appointment with Endocrinology completed in the last 6 months or scheduled in the next 3 months.  Verify A1C has been completed within the last 6 months and is below 8.5%     Last completed office visit:4/28/2025 Jeny Stahl APRN   Last completed telemed visit: Visit date not found  Next scheduled Follow up:   Future Appointments   Date Time Provider Department Center   7/9/2025  9:30 AM Mitchell, Luis Carlos Delia, DO [facility]      Last A1c result: Last A1c value was 10% done 4/28/2025.

## 2025-05-28 ENCOUNTER — TELEPHONE (OUTPATIENT)
Age: 55
End: 2025-05-28

## 2025-05-28 NOTE — TELEPHONE ENCOUNTER
Quan Sweeney,    Here are some resources that may be a good fit. Please verify your insurance coverage with any providers that you may choose to call and schedule with directly. If distance is a concern, please inquire about virtual options. If there is anything else I can assist with, then please give me a call at [phone]. If you need more immediate assistance, or assistance outside of business hours, please contact the [facility] 24/7 helpline at [phone].     [facility] [address] [place], [place]   Phone: [phone]    [address]   Phone: [phone]    [facility]  [address]  Phone: [phone]

## 2025-06-02 RX ORDER — ACYCLOVIR 800 MG/1
1 TABLET ORAL
Qty: 6 EACH | Refills: 1 | Status: SHIPPED | OUTPATIENT
Start: 2025-06-02

## 2025-06-02 NOTE — TELEPHONE ENCOUNTER
Endocrine Refill protocol for CGM supplies     Protocol Criteria:  PASSED Reason: N/A    If below requirement is met, send a 90-day supply with 1 refill per provider protocol.     Verify appointment with Endocrinology completed in the last 12 months or scheduled in the next 6 months     Last completed office visit:4/28/2025 Jeny Stahl APRN   Last completed telemed visit: Visit date not found  Next scheduled Follow up: no future appt

## 2025-06-10 ENCOUNTER — TELEPHONE (OUTPATIENT)
Dept: ENDOCRINOLOGY CLINIC | Facility: CLINIC | Age: 55
End: 2025-06-10

## 2025-06-10 NOTE — TELEPHONE ENCOUNTER
Medication Quantity Refills Start End   Continuous Glucose Sensor (FREESTYLE ROBERT 3 SENSOR) Does not apply Misc 6 each 1 2025 --   Si each every 14 (fourteen) days     PA rejected  Key:[id number]

## 2025-06-27 RX ORDER — TRAZODONE HYDROCHLORIDE 100 MG/1
100 TABLET ORAL NIGHTLY
Qty: 30 TABLET | Refills: 0 | Status: SHIPPED | OUTPATIENT
Start: 2025-06-27

## 2025-06-30 RX ORDER — TRAZODONE HYDROCHLORIDE 100 MG/1
100 TABLET ORAL NIGHTLY
Qty: 90 TABLET | Refills: 0 | OUTPATIENT
Start: 2025-06-30

## 2025-06-30 NOTE — TELEPHONE ENCOUNTER
Outpatient Medication Detail     Disp Refills Start End    traZODone 100 MG Oral Tab 30 tablet 0 6/27/2025 --    Sig - Route: Take 1 tablet (100 mg total) by mouth nightly. - Oral    Sent to pharmacy as: traZODone HCl 100 MG Oral Tablet (Desyrel)    E-Prescribing Status: Receipt confirmed by pharmacy (6/27/2025 12:26 PM CDT)    Renewals    Renewal provider: Aram Ann MD         Pharmacy    [id number] - [address] [phone], [phone]

## 2025-07-07 RX ORDER — FOLIC ACID 1 MG/1
1 TABLET ORAL DAILY
Qty: 90 TABLET | Refills: 1 | OUTPATIENT
Start: 2025-07-07

## 2025-07-07 RX ORDER — FOLIC ACID 1 MG/1
1 TABLET ORAL DAILY
Qty: 90 TABLET | Refills: 0 | Status: SHIPPED | OUTPATIENT
Start: 2025-07-07

## 2025-08-04 RX ORDER — TRAZODONE HYDROCHLORIDE 100 MG/1
100 TABLET ORAL NIGHTLY
Qty: 90 TABLET | Refills: 0 | Status: SHIPPED | OUTPATIENT
Start: 2025-08-04

## 2025-08-18 RX ORDER — NALTREXONE HYDROCHLORIDE 50 MG/1
50 TABLET, FILM COATED ORAL DAILY
Qty: 30 TABLET | Refills: 2 | Status: SHIPPED | OUTPATIENT
Start: 2025-08-18

## (undated) DIAGNOSIS — R10.13 DYSPEPSIA: ICD-10-CM

## (undated) DIAGNOSIS — Z12.11 COLON CANCER SCREENING: Primary | ICD-10-CM

## (undated) DIAGNOSIS — Z01.818 PREOP EXAMINATION: ICD-10-CM

## (undated) DIAGNOSIS — K85.90 ACUTE PANCREATITIS, UNSPECIFIED COMPLICATION STATUS, UNSPECIFIED PANCREATITIS TYPE: ICD-10-CM

## (undated) DIAGNOSIS — K21.9 GASTROESOPHAGEAL REFLUX DISEASE WITHOUT ESOPHAGITIS: ICD-10-CM

## (undated) DEVICE — LINE MNTR ADLT SET O2 INTMD

## (undated) DEVICE — TROCAR: Brand: KII® SLEEVE

## (undated) DEVICE — TROCAR: Brand: KII FIOS FIRST ENTRY

## (undated) DEVICE — Device

## (undated) DEVICE — GIJAW SINGLE-USE BIOPSY FORCEPS WITH NEEDLE: Brand: GIJAW

## (undated) DEVICE — SYRINGE MNJCT 35ML LF STRL LL

## (undated) DEVICE — 60 ML SYRINGE REGULAR TIP: Brand: MONOJECT

## (undated) DEVICE — LASSO POLYPECTOMY SNARE: Brand: LASSO

## (undated) DEVICE — [HIGH FLOW INSUFFLATOR,  DO NOT USE IF PACKAGE IS DAMAGED,  KEEP DRY,  KEEP AWAY FROM SUNLIGHT,  PROTECT FROM HEAT AND RADIOACTIVE SOURCES.]: Brand: PNEUMOSURE

## (undated) DEVICE — CONMED SCOPE SAVER BITE BLOCK, 20X27 MM: Brand: SCOPE SAVER

## (undated) DEVICE — LAP CHOLE: Brand: MEDLINE INDUSTRIES, INC.

## (undated) DEVICE — KIT CLEAN ENDOKIT 1.1OZ GOWNX2

## (undated) DEVICE — GAMMEX® PI HYBRID SIZE 6.5, STERILE POWDER-FREE SURGICAL GLOVE, POLYISOPRENE AND NEOPRENE BLEND: Brand: GAMMEX

## (undated) DEVICE — TRAP MCS 40ML 5IN PLS SCR CAP

## (undated) DEVICE — SNARE ENDOSCOPIC 10MM ROUND

## (undated) DEVICE — MEDI-VAC NON-CONDUCTIVE SUCTION TUBING: Brand: CARDINAL HEALTH

## (undated) DEVICE — PLUMEPORT ACTIV LAPAROSCOPIC SMOKE FILTRATION DEVICE: Brand: PLUMEPORT ACTIVE

## (undated) DEVICE — V2 SPECIMEN COLLECTION MANIFOLD KIT: Brand: NEPTUNE

## (undated) DEVICE — SOLUTION IRRIG 1000ML 0.9% NACL USP BTL

## (undated) DEVICE — CLIP APPLIER WITH CLIP LOGIC TECHNOLOGY: Brand: ENDO CLIP III

## (undated) DEVICE — ADHESIVE SKIN TOP FOR WND CLSR DERMBND ADV

## (undated) DEVICE — 35 ML SYRINGE REGULAR TIP: Brand: MONOJECT

## (undated) DEVICE — FORCEP RADIAL JAW 4

## (undated) DEVICE — SOLUTION IRRIG 3000ML 0.9% NACL FLX CONT

## (undated) DEVICE — KIT ENDO ORCAPOD 160/180/190

## (undated) DEVICE — GLOVE SUR 6.5 SENSICARE PI MIC PIP CRM PWD F

## (undated) DEVICE — TISSUE RETRIEVAL SYSTEM: Brand: INZII RETRIEVAL SYSTEM

## (undated) DEVICE — EVACUATOR SUR 100CC SIL BLB WND

## (undated) DEVICE — MEDI-VAC NON-CONDUCTIVE SUCTION TUBING 6MM X 1.8M (6FT.) L: Brand: CARDINAL HEALTH

## (undated) DEVICE — SUT COAT VCRL+ 0 27IN UR-6 ABSRB VLT ANTIBACT

## (undated) DEVICE — DRAIN SUR W10MMXL20CM SIL FULL PERF HUBLESS

## (undated) DEVICE — UNDYED BRAIDED (POLYGLACTIN 910), SYNTHETIC ABSORBABLE SUTURE: Brand: COATED VICRYL

## (undated) DEVICE — YANKAUER,BULB TIP,W/O VENT,RIGID,STERILE: Brand: MEDLINE

## (undated) DEVICE — GLOVE SUR 6.5 SENSICARE PI PIP GRN PWD F

## (undated) DEVICE — MARYLAND JAW LAPAROSCOPIC SEALER/DIVIDER COATED: Brand: LIGASURE

## (undated) DEVICE — TROCAR 12MM L100 HASSAN NON BLADED W/BALLOON

## (undated) NOTE — Clinical Note
TCM call completed. A TCM-HFU appointment is scheduled for 5/27/2021. The patient had no glucometer supplies at home, and a TE for this request was sent to the office. A call was also made to GI to confirm PPI recommendations. Thank you.

## (undated) NOTE — LETTER
6/17/2022          To Whom It May Concern:    Makayla Figueroa is currently under my medical care and may not return to work at this time. Please excuse Nitin Metzger for 10 days;  starting as of 06/12/22  He may return to work on 06/21/22. Activity is restricted as follows: none. If you require additional information please contact our office.         Sincerely,    Joshua Carl MD          Document generated by:  Joshua Carl MD

## (undated) NOTE — LETTER
[place] [address], [place], [place]    Authorization for Surgical Operation and Procedure                               I hereby authorize Samantha Erwin MD, my physician and his/her assistants (if applicable), which may include medical students, residents, and/or fellows, to perform the following surgical operation/ procedure and administer such anesthesia as may be determined necessary by my physician: Operation/Procedure name (s) COLONOSCOPY/ ESOPHAGOGASTRODUODENOSCOPY on Patel Lechuga   2.   I recognize that during the surgical operation/procedure, unforeseen conditions may necessitate additional or different procedures than those listed above.  I, therefore, further authorize and request that the above-named surgeon, assistants, or designees perform such procedures as are, in their judgment, necessary and desirable.    3.   My surgeon/physician has discussed prior to my surgery the potential benefits, risks and side effects of this procedure; the likelihood of achieving goals; and potential problems that might occur during recuperation.  They also discussed reasonable alternatives to the procedure, including risks, benefits, and side effects related to the alternatives and risks related to not receiving this procedure.  I have had all my questions answered and I acknowledge that no guarantee has been made as to the result that may be obtained.    4.   Should the need arise during my operation/procedure, which includes change of level of care prior to discharge, I also consent to the administration of blood and/or blood products.  Further, I understand that despite careful testing and screening of blood or blood products by collecting agencies, I may still be subject to ill effects as a result of receiving a blood transfusion and/or blood products.  The following are some, but not all, of the potential risks that can occur: fever and allergic reactions, hemolytic  reactions, transmission of diseases such as Hepatitis, AIDS and Cytomegalovirus (CMV) and fluid overload.  In the event that I wish to have an autologous transfusion of my own blood, or a directed donor transfusion, I will discuss this with my physician.  Check only if Refusing Blood or Blood Products  I understand refusal of blood or blood products as deemed necessary by my physician may have serious consequences to my condition to include possible death. I hereby assume responsibility for my refusal and release the hospital, its personnel, and my physicians from any responsibility for the consequences of my refusal.    o  Refuse   5.   I authorize the use of any specimen, organs, tissues, body parts or foreign objects that may be removed from my body during the operation/procedure for diagnosis, research or teaching purposes and their subsequent disposal by hospital authorities.  I also authorize the release of specimen test results and/or written reports to my treating physician on the hospital medical staff or other referring or consulting physicians involved in my care, at the discretion of the Pathologist or my treating physician.    6.   I consent to the photographing or videotaping of the operations or procedures to be performed, including appropriate portions of my body for medical, scientific, or educational purposes, provided my identity is not revealed by the pictures or by descriptive texts accompanying them.  If the procedure has been photographed/videotaped, the surgeon will obtain the original picture, image, videotape or CD.  The hospital will not be responsible for storage, release or maintenance of the picture, image, tape or CD.    7.   I consent to the presence of a  or observers in the operating room as deemed necessary by my physician or their designees.    8.   I recognize that in the event my procedure results in extended X-Ray/fluoroscopy time, I may develop a skin  reaction.    9. If I have a Do Not Attempt Resuscitation (DNAR) order in place, that status will be suspended while in the operating room, procedural suite, and during the recovery period unless otherwise explicitly stated by me (or a person authorized to consent on my behalf). The surgeon or my attending physician will determine when the applicable recovery period ends for purposes of reinstating the DNAR order.  10. Patients having a sterilization procedure: I understand that if the procedure is successful the results will be permanent and it will therefore be impossible for me to inseminate, conceive, or bear children.  I also understand that the procedure is intended to result in sterility, although the result has not been guaranteed.   11. I acknowledge that my physician has explained sedation/analgesia administration to me including the risk and benefits I consent to the administration of sedation/analgesia as may be necessary or desirable in the judgment of my physician.    I CERTIFY THAT I HAVE READ AND FULLY UNDERSTAND THE ABOVE CONSENT TO OPERATION and/or OTHER PROCEDURE.     ____________________________________  _________________________________        ______________________________  Signature of Patient    Signature of Responsible Person                Printed Name of Responsible Person                                      ____________________________________  _____________________________                ________________________________  Signature of Witness        Date  Time         Relationship to Patient    STATEMENT OF PHYSICIAN My signature below affirms that prior to the time of the procedure; I have explained to the patient and/or his/her legal representative, the risks and benefits involved in the proposed treatment and any reasonable alternative to the proposed treatment. I have also explained the risks and benefits involved in refusal of the proposed treatment and alternatives to the proposed  treatment and have answered the patient's questions. If I have a significant financial interest in a co-management agreement or a significant financial interest in any product or implant, or other significant relationship used in this procedure/surgery, I have disclosed this and had a discussion with my patient.     _____________________________________________________              _____________________________  (Signature of Physician)                                                                                         (Date)                                   (Time)  Patient Name: Patel Lechuga      : 9/15/1970      Printed: 2025     Medical Record #: [id number]                                      Page 1 of 1

## (undated) NOTE — LETTER
11/4/2021          To Whom It May Concern:    Ernestine Calzada is currently under my medical care, he may return to work on Saturday night the 6th of November, 2021. Activity is restricted as follows: none.     If you require additional information please

## (undated) NOTE — LETTER
[facility], [place]  Authorization for Invasive Procedures  1. I hereby authorize Dr. Grace Whitt , my physician and whomever may be designated as the doctor's assistant, to perform the following operation and/or procedure:  Esophagogastroduodenoscopy (EGD), Endoscopic Ultrasound (EUS) with possible fine needle aspiration, Colonoscopy on [place] at [facility].    2. My physician has explained to me the nature and purpose of the operation or other procedure, possible alternative methods of treatment, the risks involved and the possibility of complications to me. I understand the probable consequences of declining the recommended procedure and the alternative methods of treatment. I acknowledge that no guarantee has been made as to the result that may be obtained. 3. I recognize that during the course of this operation or other procedure, unforeseen conditions may necessitate additional or different procedures than those listed above. I, therefore, further authorize and request that the above-named physician, his/her physician assistants, or designees perform such procedures as are, in his/her professional opinion, necessary and desirable. If I have a Do Not Attempt Resuscitation (DNAR) order in place, that status will be suspended while in the operating room, procedural suite, and during the recovery period unless otherwise explicitly stated by me (or a person authorized to consent on my behalf). The surgeon or my attending physician will determine when the applicable recovery period ends for purposes of reinstating the DNAR order. 4. Should the need arise during my operation or immediate post-operative period; I also consent to the administration of blood and/or blood products.  Further, I understand that despite careful testing and screening of blood and blood products, I may still be subject to ill effects as a result of recieving a blood transfusion an/or blood producst. The following are some, but not all, of the potential risks that can occur: fever and allergic reactions, hemolytic reactions, transmission of disease such as hepatitis, AIDS, cytomegalovirus (CMV), and flluid overload. In the event that I wish to have autologous transfusions of my own blood, or a directed donor transfusion, I will discuss this with my physician. 5. I consent to the photographing of the operations or procedures to be performed for the purposes of advancing medicine, science, and/or education, provided my identity is not revealed. If the procedure has been videotaped, the physician/surgeon will obtain the original videotape. [facility] will not be responsible for storage or maintenance of this tape. 6. I consent to the presence of a  or observer as deemed necessary by my physician or his designee. 7. Any tissues or organs removed in the operation or other procedure may be disposed of by and at the discretion of [facility].    8. I understand that the physician and his/her physician assistants may not be employees or agents of [facility], [facility], [address], but are independent medical practitioners who have been permitted to use its facilities for the care and treatment of their patients. 9. Patients having a sterilization procedure: I understand that if the procedure is successful the results will be permanent and it will therefore be impossible for me to inseminate, conceive or bear children. I also understand that the procedure is intended to result in sterility, although the result has not been guaranteed. 10. I CERTIFY THAT I HAVE READ AND FULLY UNDERSTAND THE ABOVE CONSENT TO OPERATION and/or OTHER PROCEDURE. 11. I acknowledge that my physician has explained sedation/analgesia administration to me including the risks and benefits.  I consent to the administration of sedation/analgesia as may be necessary or desirable in the judgment of my physician. Signature of Patient:  ________________________________________________ Date: _________Time: _________    Responsible person in case of minor or unconscious: _____________________________Relationship: ____________     Witness Signature: ____________________________________________ Date: __________ Time: ___________    Statement of Physician  My signature below affirms that prior to the time of the procedure, I have explained to the patient and/or his legal representative, the risks and benefits involved in the proposed treatment and any reasonable alternative to the proposed treatment. I have also explained the risks and benefits involved in the refusal of the proposed treatment and have answered the patient's questions. If I have a significant financial interest in this procedure/surgery, I have disclosed this and had a discussion with my patient.     Signature of Physician:   ________________________________________Date: _________Time:_______ Patient Name: Slime Gold  : 9/15/1970   Printed: 2022    Medical Record #: [id number]

## (undated) NOTE — LETTER
12/20/2021          To Whom It May Concern:    Yany Glez is currently under my medical care and may not return to work at this time. Please excuse Fadia Anna from 1213/21 to 12/25/21. He may return to work on 12/26/2021.   Activity is restricted as fol

## (undated) NOTE — LETTER
11/18/2019              Nancy Cooks [address]         Dear Serena Hernandez,    This letter is to inform you that our office has made several attempts to reach you by phone without success.   We were attempting to contac

## (undated) NOTE — ED AVS SNAPSHOT
Ernestine Calzada   MRN: [id number]    Department:  [facility] Emergency Department   Date of Visit:  5/4/2019           Disclosure     Insurance plans vary and the physician(s) referred by the ER may not be covered by your plan.  Please contact yo CARE PHYSICIAN AT ONCE OR RETURN IMMEDIATELY TO THE EMERGENCY DEPARTMENT. If you have been prescribed any medication(s), please fill your prescription right away and begin taking the medication(s) as directed.   If you believe that any of the medications

## (undated) NOTE — LETTER
5/20/2021          To Whom It May Concern:    Marcellus Bowie is currently under my medical care and may not return to work at this time. Please excuse Miguel Angel Anguiano for 1 weeks. He may return to work on 05/24/2021. Activity is restricted as follows: none.

## (undated) NOTE — LETTER
11/25/20        Jose Tarango  [facility] [address]      Dear Danna Syed,    [facility] records indicate that you have outstanding lab work and or testing that was ordered for you and has not yet been completed:    MRI/MRCP  Please call and sched

## (undated) NOTE — LETTER
[facility]  Administration of Anesthesia  I, Patel Lechuga agree to be cared for by a physician anesthesiologist alone and/or with a nurse anesthetist, who is specially trained to monitor me and give me medicine to put me to sleep or keep me comfortable during my procedure    I understand that my anesthesiologist and/or anesthetist is not an employee or agent of [facility] or [employer]. He or she works for [facility]    As the patient asking for anesthesia services, I agree to:  Allow the anesthesiologist (anesthesia doctor) to give me medicine and do additional procedures as necessary. Some examples are: Starting or using an “IV” to give me medicine, fluids or blood during my procedure, and having a breathing tube placed to help me breathe when I’m asleep (intubation). In the event that my heart stops working properly, I understand that my anesthesiologist will make every effort to sustain my life, unless otherwise directed by [facility] Do Not Resuscitate documents.  Tell my anesthesia doctor before my procedure:  If I am pregnant.  The last time that I ate or drank.  iii. All of the medicines I take (including prescriptions, herbal supplements, and pills I can buy without a prescription (including street drugs/illegal medications). Failure to inform my anesthesiologist about these medicines may increase my risk of anesthetic complications.  iv.If I am allergic to anything or have had a reaction to anesthesia before.  I understand how the anesthesia medicine will help me (benefits).  I understand that with any type of anesthesia medicine there are risks:  The most common risks are: nausea, vomiting, sore throat, muscle soreness, damage to my eyes, mouth, or teeth (from breathing tube placement).  Rare risks include: remembering what happened during my procedure, allergic reactions to medications, injury to my airway, heart, lungs, vision, nerves, or  muscles and in extremely rare instances death.  My doctor has explained to me other choices available to me for my care (alternatives).  Pregnant Patients (“epidural”):  I understand that the risks of having an epidural (medicine given into my back to help control pain during labor), include itching, low blood pressure, difficulty urinating, headache or slowing of the baby’s heart. Very rare risks include infection, bleeding, seizure, irregular heart rhythms and nerve injury.  Regional Anesthesia (“spinal”, “epidural”, & “nerve blocks”):  I understand that rare but potential complications include headache, bleeding, infection, seizure, irregular heart rhythms, and nerve injury.    _____________________________________________________________________________  Patient (or Representative) Signature/Relationship to Patient  Date   Time    _____________________________________________________________________________   Name (if used)    Language/Organization   Time    _____________________________________________________________________________  Nurse Anesthetist Signature     Date   Time  _____________________________________________________________________________  Anesthesiologist Signature     Date   Time  I have discussed the procedure and information above with the patient (or patient’s representative) and answered their questions. The patient or their representative has agreed to have anesthesia services.    _____________________________________________________________________________  Witness        Date   Time  I have verified that the signature is that of the patient or patient’s representative, and that it was signed before the procedure  Patient Name: Patel Lechuga     : 9/15/1970                 Printed: 2025 at 3:16 PM    Medical Record #: [id number]                                            Page 1 of 1  ----------ANESTHESIA CONSENT----------

## (undated) NOTE — LETTER
10/25/2021          To Whom It May Concern:    Ernestine Calzada is currently under my medical care and may not return to work at this time. Please excuse Karlene Alvarez for 10 days from 10/21/21 to 20/31/21. He may return to work on 11/01/21.   Activity is restri

## (undated) NOTE — LETTER
Date & Time: 11/2/2021, 4:49 PM  Patient: Sundar Lund  Encounter Provider(s):    Evelyn Deutsch MD       To Whom It May Concern:    Sundar Lund was seen and treated in our department on 11/2/2021. He should not return to work until 11/4/21.

## (undated) NOTE — LETTER
[address], [facility]   Date:   9/13/2021     Name:   Everardo Grove    YOB: 1970   MRN:   [id number]       WHERE IS YOUR PAIN NOW?   Estevan the areas on your body where you feel the describ

## (undated) NOTE — LETTER
6/17/2022          To Whom It May Concern:    Claudette Mend is currently under my medical care and may not return to work at this time. Please excuse Kunal Steinberg for 10 days. He may return to work on 06/27/22. Activity is restricted as follows: none. If you require additional information please contact our office.         Sincerely,    Millie Davis MD          Document generated by:  Millie Davis MD

## (undated) NOTE — LETTER
Date & Time: 5/4/2019, 2:36 PM  Patient: Stevo Amato  Encounter Provider(s):    On File, E LIBBY Attending  Lyna Gowers, MD       To Whom It May Concern:    Stevo Amato was seen and treated in our department on 5/4/2019.  He cannot work for the next 3 day

## (undated) NOTE — LETTER
5/14/2020          To Whom It May Concern:    Flaquita Michaud is currently under my medical care and may not return to work at this time. Please excuse Bárbara Jacobsen for 3 days. He may return to work on 05/19/2020. Activity is restricted as follows: none.

## (undated) NOTE — LETTER
12/20/2021          To Whom It May Concern:    Kiera Perkins is currently under my medical care and may not return to work at this time. Please excuse Ros Leoncio for 2 weeks. He may return to work on 12/26/21. Activity is restricted as follows: none.

## (undated) NOTE — LETTER
8/11/2022    To Whom It May Concern:    Jerlyn Aase is currently under my medical care and may return to work at this time. He may return to work on 8/14/2022 Sunday. Activity is restricted as follows: none. If you require additional information please contact our office.         Sincerely,    DO Gonzalez            Document generated by:  DO Gonzalez

## (undated) NOTE — LETTER
4/11/2020              Edis Olmstead        [address] [facility]         Dear Raul Kumar,    This letter is to inform you that our office has made several attempts to reach you by phone without success.   We were attempting to contact

## (undated) NOTE — Clinical Note
TCM reahn completed. A TCM-[facility] appointment is scheduled for 2/27/2023. The patient did report continued back pain and a TE was sent to the office with a condition update. Thank you.

## (undated) NOTE — LETTER
6/22/2022          To Whom It May Concern:    Yanira Olea is currently under my medical care and may not return to work at this time. Please excuse Jaylin Quintero from 06/12/2022 to 06/25/2022. He may return to work on 06/26/2022. Activity is restricted as follows: none. If you require additional information please contact our office.         Sincerely,    Lindsey Shaw MD          Document generated by:  Lindsey Shaw MD

## (undated) NOTE — ED AVS SNAPSHOT
Stevo Amato   MRN: [id number]    Department:  [facility] Emergency Department   Date of Visit:  12/21/2019           Disclosure     Insurance plans vary and the physician(s) referred by the ER may not be covered by your plan.  Please contact CARE PHYSICIAN AT ONCE OR RETURN IMMEDIATELY TO THE EMERGENCY DEPARTMENT. If you have been prescribed any medication(s), please fill your prescription right away and begin taking the medication(s) as directed.   If you believe that any of the medications

## (undated) NOTE — LETTER
7/13/2022          To Whom It May Concern:    Geoffrey Art is currently under my medical care and may not return to work at this time. Please excuse Devin Nelida for 2 weeks  He may return to work on 07/25/22. Activity is restricted as follows: none. If you require additional information please contact our office.         Sincerely,    Santana Dancer, MD          Document generated by:  Santana Dancer, MD